# Patient Record
Sex: FEMALE | Race: WHITE | NOT HISPANIC OR LATINO | Employment: OTHER | URBAN - METROPOLITAN AREA
[De-identification: names, ages, dates, MRNs, and addresses within clinical notes are randomized per-mention and may not be internally consistent; named-entity substitution may affect disease eponyms.]

---

## 2017-01-04 ENCOUNTER — LAB CONVERSION - ENCOUNTER (OUTPATIENT)
Dept: OTHER | Facility: OTHER | Age: 67
End: 2017-01-04

## 2017-01-04 ENCOUNTER — GENERIC CONVERSION - ENCOUNTER (OUTPATIENT)
Dept: OTHER | Facility: OTHER | Age: 67
End: 2017-01-04

## 2017-01-04 LAB
ABN TEST REFUSED (HISTORICAL): NORMAL
HEPATITIS C ANTIBODY (HISTORICAL): NORMAL
RAM (HISTORICAL): NORMAL
SIGNAL TO CUT-OFF (HISTORICAL): 0.02
TSH SERPL DL<=0.05 MIU/L-ACNC: 2.52 MIU/L (ref 0.4–4.5)

## 2017-02-16 ENCOUNTER — ALLSCRIPTS OFFICE VISIT (OUTPATIENT)
Dept: OTHER | Facility: OTHER | Age: 67
End: 2017-02-16

## 2017-09-18 ENCOUNTER — ALLSCRIPTS OFFICE VISIT (OUTPATIENT)
Dept: OTHER | Facility: OTHER | Age: 67
End: 2017-09-18

## 2017-10-11 ENCOUNTER — GENERIC CONVERSION - ENCOUNTER (OUTPATIENT)
Dept: OTHER | Facility: OTHER | Age: 67
End: 2017-10-11

## 2017-10-11 DIAGNOSIS — Z12.31 ENCOUNTER FOR SCREENING MAMMOGRAM FOR MALIGNANT NEOPLASM OF BREAST: ICD-10-CM

## 2017-11-21 ENCOUNTER — ALLSCRIPTS OFFICE VISIT (OUTPATIENT)
Dept: OTHER | Facility: OTHER | Age: 67
End: 2017-11-21

## 2017-11-21 DIAGNOSIS — I10 ESSENTIAL (PRIMARY) HYPERTENSION: ICD-10-CM

## 2017-11-22 NOTE — PROGRESS NOTES
Assessment    1  Advanced directives, counseling/discussion (V65 49) (Z71 89)   2  Never smoked cigarettes (V49 89) (Z78 9)   3  Encounter for preventive health examination (V70 0) (Z00 00)   4  Awakens from sleep at night (780 59) (G47 8)   5  Need for shingles vaccine (V04 89) (Z23)   6  Hypertension (401 9) (I10)    Plan  Awakens from sleep at night    · Zolpidem Tartrate 10 MG Oral Tablet; 1/2-1 TAB AT HS FOR INSOMNIA  Health Maintenance, Hypertension    · Losartan Potassium-HCTZ 100-12 5 MG Oral Tablet  Hypertension    · Valsartan 320 MG Oral Tablet; TAKE 1 TABLET DAILY  at night   · (1) CBC/PLT/DIFF; Status:Active; Requested for:21Nov2017;    · (1) COMPREHENSIVE METABOLIC PANEL; Status:Active; Requested for:21Nov2017;    · (1) LIPID PANEL, FASTING; Status:Active; Requested for:21Nov2017;   Need for shingles vaccine    · Shingrix 50 MCG Intramuscular Suspension Reconstituted (Shingrix 50 MCG IntramuscularSuspension Reconstituted); TO BE ADMINISTRED AT PHARMACY  Screening for genitourinary condition    · *VB - Urinary Incontinence Screen (Dx Z13 89 Screen for UI); Status:Complete - Retrospective ByProtocol Authorization;   Done: [id number] 08:09AM    Discussion/Summary  Discussion Summary:   SHINGLES VACCINEIN 3 WEEKSSTRETCHING FOR BACK PAIN AND HAMSTRING TIHGNESS- MAY NEED PTVALSARTAN HCTZ TO VALSARTAN FOR BETTER BP CONTROL AND AVOID DIURETIC;INCREASE MELOXICAM TO 2 PER DAYMAMMO AND PAP UP TO DATEUP TO ABRAHAM VACCINEUP TO DATE  Welcome to [facility] and Wellness Visits: Impression: Subsequent Annual Wellness Visit,-- with preventive exam as well as age and risk appropriate counseling completed  Cardiovascular screening and counseling: counseling was given on maintaining a healthy diet,-- counseling was given on maintaining a healthy weight-- and-- Dx - V81 2 Screen for CV Disorder  Diabetes screening and counseling: Dx - V77 1 Screen for DM    Colorectal cancer screening and counseling: Dx - V76 51 Screen for CRC  Osteoporosis screening and counseling: counseling was given on obtaining adequate amounts of calcium and vitamin D on a daily basis-- and-- counseling was given on the importance of regular weightbearing exercise  Abdominal aortic aneurysm screening and counseling: Dx - V81 2 Screen for CV Disorder  Glaucoma screening and counseling: Dx - V80 1 Screen for Glaucoma  Hepatitis C Screening:  The patient agrees to Hepatitis C screening  Immunizations: influenza vaccine is up to date this year,-- influenza vaccination is recommended annually,-- hepatitis B vaccination series is not indicated at this time due to the patient's low risk of gina the disease-- and-- RX PROVIDED  Advance Directive Planning: complete and up to date,-- she was encouraged to follow-up with me to discuss her questions and/or decisions  Patient Discussion: plan discussed with the patient,-- follow-up visit needed in one year  Chief Complaint  Chief Complaint Free Text Note Form: PATIENT HERE FOR AMW; SHE FEELS WELL; SHE USES MELOXICAM FOR JOINT PAIN- DOESNT FEEL IT IS WORKING WELL; SHE GETS LEG CRAMPS  [place]; SHE HAS HIP PAIN- HURTS IN RIGHT BUTTOCK AND HIP AREA; WORSE IF SHE SITS A LONG TIME; History of Present Illness  HPI: PATIENT HERE FOR AMW;   Hypertension (Follow-Up): The patient presents for follow-up of essential hypertension-- and-- RIGHT BUTTOCK PAIN- NO RADIAITON- HURTS WHEN SHE GETS UP  Symptoms:   Sleep Disorders: The patient is being seen for worsening symptoms of a sleep disorder  The sleep disorder is characterized as AWAKENS FROM SLEEP  Symptoms:  unrefreshing sleep,-- restless legs-- and-- AWAKENS DUE TO BACK / HIP PAIN AND LEG CRAMPS, but-- no excessive daytime sleepiness,-- no witnessed apnea during sleep,-- no witnessed gasping during sleep,-- no nocturnal choking,-- no snoring,-- no sleepiness when sedentary,-- no impaired concentration,-- no memory problems-- and-- no irritability  The patient is currently experiencing symptoms  Back Pain (Brief): The patient is being seen for a routine clinic follow-up of and RIGHT BUTTOCK PAIN back pain  Symptoms:  back pain, but-- no back stiffness,-- no decreased spine range of motion,-- no decreased flexion,-- no decreased extension,-- no decreased rotation,-- no lower extremity numbness,-- no lower extremity tingling-- and-- no lower extremity weakness  The patient is currently experiencing symptoms  Welcome to [facility]: The patient is being seen for the subsequent annual wellness visit  Medicare Screening and Risk Factors  Medicare Screening Tests Risk Questions  Abdominal aortic aneurysm risk assessment: none indicated  Osteoporosis risk assessment: none indicated  HIV risk assessment: none indicated  Drug and Alcohol Use: The patient reports never drinking alcohol  She has never used illicit drugs  Diet and Physical Activity: Current diet includes well balanced meals  She exercises daily  Exercise: walking  Mood Disorder and Cognitive Impairment Screening:  Depression screening  negative for symptoms  She denies feeling down, depressed, or hopeless over the past two weeks  -- She denies feeling little interest or pleasure in doing things over the past two weeks  Cognitive impairment screening: denies difficulty learning/retaining new information,-- denies difficulty handling complex tasks,-- denies difficulty with reasoning,-- denies difficulty with spatial ability and orientation-- and-- denies difficulty with language  Advance Directives: Advance directives: living will,-- durable power of  for health care directives-- and-- advance directives  Co-Managers and Medical Equipment/Suppliers: See Patient Care Team   Reviewed Updated [facility]:  Last Medicare Wellness Visit Information was reviewed, patient interviewed, no change since last AW     Preventive Quality Program 65 and Older: Falls Risk: The patient fell 0 times in the past 12 months  The patient is currently asymptomatic Symptoms Include: The patient currently has no urinary incontinence symptoms  Date of last glaucoma screen was 2017      Review of Systems  Complete-Female:  Constitutional: negative,-- no fever,-- no chills,-- no malaise-- and-- no fatigue  Head and Face: negative  Eyes: negative  ENT: negative,-- no earache,-- no hearing loss,-- no nasal congestion-- and-- no nasal discharge  Cardiovascular: negative,-- no chest pain,-- no palpitations,-- the heart is not racing-- and-- no lightheadedness  Respiratory: negative,-- no cough-- and-- no dry cough  Gastrointestinal: negative,-- no abdominal pain,-- no abdominal bloating,-- no abdominal cramps,-- no nausea-- and-- no vomiting  Genitourinary: negative,-- no dysuria,-- no urinary frequency-- and-- no urinary urgency  Musculoskeletal: back pain-- and-- RIGHT BUTTOCK PAIN; RIGHT HIP PAIN, but-- negative,-- as noted in HPI-- and-- no generalized muscle aches  Integumentary and Breasts: negative,-- no rashes-- and-- no skin lesions  Neurological: negative,-- no headache,-- no confusion-- and-- no dizziness  Psychiatric: insomnia-- and-- TROUB LE SLEEPING, but-- negative-- and-- no irritability  Endocrine: negative,-- no hot flashes-- and-- no muscle weakness  Hematologic and Lymphatic: negative,-- no swollen glands-- and-- no swollen glands in the neck  PHQ-9 Depression Scale: Over the past 2 weeks, how often have you been bothered by the following problems? 1 ) Little interest or pleasure in doing things? Not at all   2 ) Feeling down, depressed or hopeless? Not at all   3 ) Trouble falling asleep or sleeping too much? Several days  4 ) Feeling tired or having little energy? Not at all   5 ) Poor appetite or overeating? Not at all   6 ) Feeling bad about yourself, or that you are a failure, or have let yourself or your family down?  Not at all   7 ) Trouble concentrating on things, such as reading a newspaper or watching television? Not at all   8 ) Moving or speaking so slowly that other people could have noticed, or the opposite, moving or speaking faster than usual? Not at all  How difficult have these problems made it for you to do your work, take care of things at home, or get along with people? Not at all  Score       Active Problems  1  Advanced directives, counseling/discussion (V65 49) (Z71 89)   2  Allergic rhinitis (477 9) (J30 9)   3  Awakens from sleep at night (780 59) (G47 8)   4  Encounter for screening mammogram for breast cancer (V76 12) (Z12 31)   5  Hypertension (401 9) (I10)   6  Lentigo Senilis (709 09)   7  Medicare annual wellness visit, subsequent (V70 0) (Z00 00)   8  Need for influenza vaccination (V04 81) (Z23)   9  Need for shingles vaccine (V04 89) (Z23)   10  Osteoarthrosis (715 90) (M19 90)   11  Screening for genitourinary condition (V81 6) (Z13 89)   12  Sebaceous Hyperplasia (706 9)   13  Skin rash (782 1) (R21)   14  Subclinical hypothyroidism (244 8) (E03 9)   15  Trigger finger (727 03) (M65 30)   16  Trochanteric bursitis (726 5) (M70 60)    Past Medical History  1  History of RLL pneumonia (486) (J18 1)   2  History of Trochanteric bursitis (726 5) (M70 60)  Active Problems And Past Medical History Reviewed: The active problems and past medical history were reviewed and updated today  Surgical History  1  History of Repair Of Retinal Detachment Complex  Surgical History Reviewed: The surgical history was reviewed and updated today  Family History  Mother    1  Family history of Coronary disease   2  Family history of colon cancer (V16 0) (Z80 0)   3  Family history of diabetes mellitus (V18 0) (Z83 3)  Father    4  Family history of Coronary disease   5  Family history of diabetes mellitus (V18 0) (Z83 3)  Family History    6  Family history of Colon Cancer (V16 0)   7  Family history of Coronary Artery Disease (V17 49)   8  Family history of Type 2 Diabetes Mellitus  Family History Reviewed: The family history was reviewed and updated today  Social History     · Never smoked cigarettes (V49 89) (Z78 9)  Social History Reviewed: The social history was reviewed and updated today  The social history was reviewed and is unchanged  Current Meds   1  B12-Active 1 MG Oral Tablet Chewable; Therapy: (Recorded:11Aug2016) to Recorded   2  Calcium Plus Vitamin D CAPS; Therapy: (Recorded:11Aug2016) to Recorded   3  CVS Loratadine TABS; take 1 tablet daily as needed Recorded   4  CVS Vitamin E 200 UNIT CAPS; Therapy: (Recorded:11Aug2016) to Recorded   5  Fluticasone Propionate 50 MCG/ACT Nasal Suspension; USE 2 SPRAYS IN EACH NOSTRIL ONCE DAILY; Therapy: [id number] to (Last Rx:[id number])  Requested for: 18Sep2017 Ordered   6  Losartan Potassium-HCTZ 100-12 5 MG Oral Tablet; Take 1 tablet by mouth  daily; Therapy: [id number] to (Evaluate:08Nov2017)  Requested for: 10Aug2017; Last Rx:10Aug2017 Ordered   7  Melatonin 3 MG Oral Capsule; Therapy: (Recorded:11Aug2016) to Recorded   8  Meloxicam 7 5 MG Oral Tablet; Take 1 tablet by mouth  every day; Therapy: [id number] to (Luis Daniel Watson)  Requested for: 10Aug2017; Last Rx:10Aug2017 Ordered  Medication List Reviewed: The medication list was reviewed and updated today  Allergies  1  No Known Drug Allergies    Vitals  Vital Signs    Recorded: 21Nov2017 08:06AM   Temperature 97 9 F, Tympanic   Heart Rate 78   Respiration 16   Systolic 479, LUE, Sitting   Diastolic 209, LUE, Sitting   Height 5 ft 5 in   Weight 163 lb 8 oz   BMI Calculated 27 21   BSA Calculated 1 82       Physical Exam   Constitutional  General appearance: No acute distress, well appearing and well nourished  Eyes  Conjunctiva and lids: No swelling, erythema or discharge  Conjunctiva Findings: normal in both eyes  Eye Lids: normal bilaterally  Pupils and irises: Equal, round, reactive to light     Ears, Nose, Mouth, and Throat  External inspection of ears and nose: Normal    Otoscopic examination: Tympanic membranes translucent with normal light reflex  Canals patent without erythema  -- CLAR B L  Hearing: Normal    Nasal mucosa, septum, and turbinates: Normal without edema or erythema  Lips, teeth, and gums: Normal, good dentition  Oropharynx: Normal with no erythema, edema, exudate or lesions  Inspection of the oropharynx showed fully visible tonsils, uvula and soft palate (Mallampati class 1)  Oral mucosa was moist, but-- was normal  The palate examination showed no abnormalities  The tongue was normal  The tonsils were normal   Neck  Neck: Supple, symmetric, trachea midline, no masses  Thyroid: Normal, no thyromegaly  -- NO NODULES  Pulmonary  Respiratory effort: No increased work of breathing or signs of respiratory distress  Percussion of chest: Normal    Palpation of chest: Normal    Auscultation of lungs: Clear to auscultation  Auscultation of the lungs revealed no expiratory wheezing,-- normal expiratory time-- and-- no inspiratory wheezing  no rales or crackles were heard bilaterally  no rhonchi  no friction rub  no wheezing  no diminished breath sounds  no bronchial breath sounds  -- LEAR B/L  Cardiovascular  Palpation of heart: Normal PMI, no thrills  Auscultation of heart: Normal rate and rhythm, normal S1 and S2, no murmurs  The heart rate was normal  The rhythm was regular  Heart sounds: normal S1,-- normal S2,-- no S3-- and-- no S4  no murmurs were heard  -- REGULAR NO S3 NO S4   Carotid pulses: 2+ bilaterally  Abdominal aorta: Normal    Femoral pulses: 2+ bilaterally  Pedal pulses: 2+ bilaterally  Examination of extremities for edema and/or varicosities: Normal    Abdomen  Abdomen: Non-tender, no masses  Liver and spleen: No hepatomegaly or splenomegaly  Lymphatic  Palpation of lymph nodes in neck: No lymphadenopathy  -- NO NODES  -- NO NODES    Musculoskeletal  Gait and station: Normal  -- RIGHT PAIN AT PSIS; NORMAL FB AND BB;   Digits and nails: Normal without clubbing or cyanosis  Joints, bones, and muscles: Normal    Range of motion: Normal    Stability: Normal    Muscle strength/tone: Normal    Skin  Skin and subcutaneous tissue: Normal without rashes or lesions  Palpation of skin and subcutaneous tissue: Normal turgor  -- BUNIONS B/L FEET  Neurologic  Cranial nerves: Cranial nerves II-XII intact  Reflexes: 2+ and symmetric  Sensation: No sensory loss  Psychiatric  Judgment and insight: Normal    Orientation to person, place, and time: Normal    Recent and remote memory: Intact  Mood and affect: Normal        Results/Data  *VB - Urinary Incontinence Screen (Dx Z13 89 Screen for UI) [id number] 08:09AM [facility]     Test Name Result Flag Reference   Urinary Incontinence Assessment [id number]       Falls Risk Assessment (Dx Z13 89 Screen for Neurologic Disorder) [id number] 08:08AM User, [employer]     Test Name Result Flag Reference   Falls Risk      No falls in the past year     PHQ-9 Adult Depression Screening 21Nov2017 08:08AM User, [employer]     Test Name Result Flag Reference   PHQ-9 Adult Depression Score 1       Over the last two weeks, how often have you been bothered by any of the following problems? Little interest or pleasure in doing things: Not at all - 0 Feeling down, depressed, or hopeless: Not at all - 0 Trouble falling or staying asleep, or sleeping too much: Several days - 1 Feeling tired or having little energy: Not at all - 0 Poor appetite or over eating: Not at all - 0 Feeling bad about yourself - or that you are a failure or have let yourself or your family down: Not at all - 0 Trouble concentrating on things, such as reading the newspaper or watching television: Not at all - 0 Moving or speaking so slowly that other people could have noticed   Or the opposite -  being so fidgety or restless that you have been moving around a lot more than usual: Not at all - 0 Thoughts that you would be better off dead, or of hurting yourself in some way: Not at all - 0   PHQ-9 Adult Depression Screening Negative     PHQ-9 Difficulty Level Not difficult at all     PHQ-9 Severity Minimal Depression           Signatures   Electronically signed by :  Shiraz Villalobos DO; Nov 21 2017  8:43AM EST                       (Author)

## 2017-12-04 ENCOUNTER — GENERIC CONVERSION - ENCOUNTER (OUTPATIENT)
Dept: OTHER | Facility: OTHER | Age: 67
End: 2017-12-04

## 2017-12-04 ENCOUNTER — HOSPITAL ENCOUNTER (OUTPATIENT)
Dept: MAMMOGRAPHY | Facility: HOSPITAL | Age: 67
Discharge: HOME | End: 2017-12-04
Payer: MEDICARE

## 2017-12-04 DIAGNOSIS — Z12.31 ENCOUNTER FOR SCREENING MAMMOGRAM FOR MALIGNANT NEOPLASM OF BREAST: ICD-10-CM

## 2017-12-04 PROCEDURE — 77063 BREAST TOMOSYNTHESIS BI: CPT

## 2017-12-04 PROCEDURE — G0202 SCR MAMMO BI INCL CAD: HCPCS

## 2018-01-02 LAB
A/G RATIO (HISTORICAL): 1.5 (CALC) (ref 1–2.5)
ALBUMIN SERPL BCP-MCNC: 4.2 G/DL (ref 3.6–5.1)
ALP SERPL-CCNC: 65 U/L (ref 33–130)
ALT SERPL W P-5'-P-CCNC: 16 U/L (ref 6–29)
AST SERPL W P-5'-P-CCNC: 22 U/L (ref 10–35)
BASOPHILS # BLD AUTO: 0.5 %
BASOPHILS # BLD AUTO: 40 CELLS/UL (ref 0–200)
BILIRUB SERPL-MCNC: 0.6 MG/DL (ref 0.2–1.2)
BUN SERPL-MCNC: 20 MG/DL (ref 7–25)
BUN/CREA RATIO (HISTORICAL): 18 (CALC) (ref 6–22)
CALCIUM SERPL-MCNC: 9.7 MG/DL (ref 8.6–10.4)
CHLORIDE SERPL-SCNC: 104 MMOL/L (ref 98–110)
CHOLEST SERPL-MCNC: 242 MG/DL
CHOLEST/HDLC SERPL: 4.2 (CALC)
CO2 SERPL-SCNC: 28 MMOL/L (ref 20–31)
CREAT SERPL-MCNC: 1.09 MG/DL (ref 0.5–0.99)
DEPRECATED RDW RBC AUTO: 12.2 % (ref 11–15)
EGFR AFRICAN AMERICAN (HISTORICAL): 60 ML/MIN/1.73M2
EGFR-AMERICAN CALC (HISTORICAL): 52 ML/MIN/1.73M2
EOSINOPHIL # BLD AUTO: 292 CELLS/UL (ref 15–500)
EOSINOPHIL # BLD AUTO: 3.7 %
GAMMA GLOBULIN (HISTORICAL): 2.8 G/DL (CALC) (ref 1.9–3.7)
GLUCOSE (HISTORICAL): 95 MG/DL (ref 65–99)
HCT VFR BLD AUTO: 40.9 % (ref 35–45)
HDLC SERPL-MCNC: 57 MG/DL
HGB BLD-MCNC: 13.5 G/DL (ref 11.7–15.5)
LDL CHOLESTEROL (HISTORICAL): 151 MG/DL (CALC)
LYMPHOCYTES # BLD AUTO: 3460 CELLS/UL (ref 850–3900)
LYMPHOCYTES # BLD AUTO: 43.8 %
MCH RBC QN AUTO: 31.3 PG (ref 27–33)
MCHC RBC AUTO-ENTMCNC: 33 G/DL (ref 32–36)
MCV RBC AUTO: 94.9 FL (ref 80–100)
MONOCYTES # BLD AUTO: 719 CELLS/UL (ref 200–950)
MONOCYTES (HISTORICAL): 9.1 %
NEUTROPHILS # BLD AUTO: 3389 CELLS/UL (ref 1500–7800)
NEUTROPHILS # BLD AUTO: 42.9 %
NON-HDL-CHOL (CHOL-HDL) (HISTORICAL): 185 MG/DL (CALC)
PLATELET # BLD AUTO: 281 THOUSAND/UL (ref 140–400)
PMV BLD AUTO: 10 FL (ref 7.5–12.5)
POTASSIUM SERPL-SCNC: 3.8 MMOL/L (ref 3.5–5.3)
RBC # BLD AUTO: 4.31 MILLION/UL (ref 3.8–5.1)
SODIUM SERPL-SCNC: 140 MMOL/L (ref 135–146)
TOTAL PROTEIN (HISTORICAL): 7 G/DL (ref 6.1–8.1)
TRIGL SERPL-MCNC: 199 MG/DL
WBC # BLD AUTO: 7.9 THOUSAND/UL (ref 3.8–10.8)

## 2018-01-08 ENCOUNTER — GENERIC CONVERSION - ENCOUNTER (OUTPATIENT)
Dept: OTHER | Facility: OTHER | Age: 68
End: 2018-01-08

## 2018-01-10 NOTE — RESULT NOTES
Message   Labs are all excellent- no evidence of hepatitis c infection  Verified Results  (1) COMPREHENSIVE METABOLIC PANEL [id number] 00:56MW [employer] [facility]     Test Name Result Flag Reference   GLUCOSE 90 mg/dL  65-99   Fasting reference interval   UREA NITROGEN (BUN) 20 mg/dL  7-25   CREATININE 0 90 mg/dL  0 50-0 99   For patients >52years of age, the reference limit  for Creatinine is approximately 13% higher for people  identified as -American  eGFR NON-AFR  AMERICAN 66 mL/min/1 73m2  > OR = 60   eGFR AFRICAN AMERICAN 76 mL/min/1 73m2  > OR = 60   BUN/CREATININE RATIO   4-55   NOT APPLICABLE (calc)   SODIUM 140 mmol/L  135-146   POTASSIUM 3 7 mmol/L  3 5-5 3   CHLORIDE 103 mmol/L     CARBON DIOXIDE 27 mmol/L  20-31   CALCIUM 9 7 mg/dL  8 6-10 4   PROTEIN, TOTAL 6 6 g/dL  6 1-8 1   ALBUMIN 4 1 g/dL  3 6-5 1   GLOBULIN 2 5 g/dL (calc)  1 9-3 7   ALBUMIN/GLOBULIN RATIO 1 6 (calc)  1 0-2 5   BILIRUBIN, TOTAL 0 5 mg/dL  0 2-1 2   ALKALINE PHOSPHATASE 60 U/L     AST 18 U/L  10-35   ALT 13 U/L  6-29     (1) LIPID PANEL, FASTING [id number] 09:12AM [facility]     Test Name Result Flag Reference   CHOLESTEROL, TOTAL 190 mg/dL  125-200   HDL CHOLESTEROL 53 mg/dL  > OR = 46   TRIGLICERIDES 067 mg/dL  <150   LDL-CHOLESTEROL 111 mg/dL (calc)  <130   Desirable range <100 mg/dL for patients with CHD or  diabetes and <70 mg/dL for diabetic patients with  known heart disease  CHOL/HDLC RATIO 3 6 (calc)  < OR = 5 0   NON HDL CHOLESTEROL 137 mg/dL (calc)     Target for non-HDL cholesterol is 30 mg/dL higher than   LDL cholesterol target       (Q) HEPATITIS C ANTIBODY [id number] 09:12AM [facility]     Test Name Result Flag Reference   HEPATITIS C ANTIBODY NON-REACTIVE  NON-REACTIVE   SIGNAL TO CUT-OFF 0 02  <1 00     (Q) ABN TEST REFUSAL [id number] 09:12AM [facility]     Test Name Result Flag Reference   YOVANY See Below     Be advised that your patient has indicated on  the advance beneficiary notice their decision  not to receive the following laboratory tests  As a result, the tests will not be performed     ABN TEST REFUSED VIT D / A1C       (Q) TSH, 3RD GENERATION W/REFLEX TO FT4 [id number] 09:12AM [facility]   REPORT COMMENT:  FASTING:YES     Test Name Result Flag Reference   TSH W/REFLEX TO FT4 2 52 mIU/L  0 40-4 50

## 2018-01-10 NOTE — PROGRESS NOTES
Assessment    1  Medicare annual wellness visit, subsequent (V70 0) (Z00 00)   2  Encounter for screening mammogram for breast cancer (V76 12) (Z12 31)   3  Encounter for screening for cardiovascular disorders (V81 2) (Z13 6)   4  Encounter for screening for diabetes mellitus (V77 1) (Z13 1)   5  Need for shingles vaccine (V04 89) (Z23)    Plan  Encounter for screening for cardiovascular disorders, Encounter for screening for  diabetes mellitus    · (1) HEMOGLOBIN A1C; Status:Active; Requested for:16Nov2016;   Encounter for screening mammogram for breast cancer    · (1) COMPREHENSIVE METABOLIC PANEL; Status:Active; Requested for:16Nov2016;    · (1) HEP C ANTIBODY; Status:Active; Requested for:16Nov2016;    · (1) VITAMIN D 25-HYDROXY; Status:Active; Requested for:16Nov2016;   Encounter for screening mammogram for breast cancer, Hypertension, Subclinical  hypothyroidism    · (1) LIPID PANEL, FASTING; Status:Active; Requested for:16Nov2016;    · (1) TSH WITH FT4 REFLEX; Status:Active; Requested for:16Nov2016; Health Maintenance, Hypertension    · Losartan Potassium-HCTZ 100-12 5 MG Oral Tablet; Take 1 tablet by mouth   daily  Need for prophylactic vaccination against diphtheria-tetanus-pertussis (DTP), Need for  shingles vaccine    · Zoster (Zostavax) (Zoster (Zostavax)); PATIENT TO OBTAIN SHINGLES VACCINE  AT PHARMACY  Shoulder joint pain, unspecified laterality    · Meloxicam 7 5 MG Oral Tablet; Take 1 tablet by mouth  every day    Discussion/Summary    OBTAIN LABS  BACK PAIN- BETTER WITH EXERCISE  XRAYS REVIEWED  MAMMO DUE NEXT WEEK  REFILL MEDS  AK ON SCALP 1 CM BY 5 MM FLESH COLORED;   REFER TO DR Susan Abarca  Impression: Subsequent Annual Wellness Visit, with preventive exam as well as age and risk appropriate counseling completed  Cardiovascular screening and counseling: Dx - V81 2 Screen for CV Disorder  Diabetes screening and counseling: Dx - V77 1 Screen for DM     Colorectal cancer screening and counseling: Dx - V76 51 Screen for CRC  Cervical cancer screening and counseling: screening is current  Osteoporosis screening and counseling: screening is current  Glaucoma screening and counseling: Dx - V80 1 Screen for Glaucoma  Immunizations: the lifetime pneumococcal vaccine has been completed, hepatitis B vaccination series is not indicated at this time due to the patient's low risk of gina the disease and Tdap vaccine needed today  Advance Directive Planning: complete and up to date, paperwork and instructions were given to the patient  Patient Discussion: plan discussed with the patient, follow-up visit needed in one year  Chief Complaint  PATIENT HERE FOR AMW; History of Present Illness  HPI: PATIENT FEELS WELL;   HER BP IS STABLE    Welcome to [employer] and Wellness Visits: The patient is being seen for the subsequent annual wellness visit  Medicare Screening and Risk Factors   Medicare Screening Tests Risk Questions   Abdominal aortic aneurysm risk assessment: none indicated  Osteoporosis risk assessment: none indicated  HIV risk assessment: none indicated  Drug and Alcohol Use: The patient reports never drinking alcohol  She has never used illicit drugs  Diet and Physical Activity: Current diet includes well balanced meals  She exercises daily  Exercise: walking  Mood Disorder and Cognitive Impairment Screening:   Depression screening  negative for symptoms  She denies feeling down, depressed, or hopeless over the past two weeks  She denies feeling little interest or pleasure in doing things over the past two weeks  Cognitive impairment screening: denies difficulty learning/retaining new information, denies difficulty handling complex tasks, denies difficulty with reasoning, denies difficulty with spatial ability and orientation and denies difficulty with language  Functional Ability/Level of Safety: Hearing is normal bilaterally   The patient is currently able to do activities of daily living without limitations, able to do instrumental activities of daily living without limitations, able to participate in social activities without limitations and able to drive without limitations  Activities of daily living details: does not need help using the phone and no meal preparation help needed  Fall risk factors:  no visual impairment and no cognitive impairment  Home safety risk factors:  no unfamiliar surroundings, no poor household lighting, no uneven floors, no household clutter, grab bars in the bathroom and handrails on the stairs  Advance Directives: Advance directives: living will, durable power of  for health care directives and advance directives  Co-Managers and Medical Equipment/Suppliers: See Patient Care Team   Reviewed Updated [facility]:   Last Medicare Wellness Visit Information was reviewed, patient interviewed, no change since last [facility]  Preventive Quality Program 65 and Older: Falls Risk: The patient fell 1 times in the past 12 months  TRIPPED UP STAIRS  The patient is currently asymptomatic Symptoms Include: no confusion, no lightheadedness, no vertigo, no dizziness, no syncope, no impaired balance, no visual problems and no leg weakness  Associated symptoms:  No associated symptoms are reported no impaired mobility and no impaired ability to live independently  The patient currently has no urinary incontinence symptoms  Urinary Incontinence Symptoms includes: nocturia, but no urinary incontinence, no incomplete bladder emptying, no urinary frequency, no urinary urgency, no dysuria and no straining    Date of last glaucoma screen was 1 -      Patient Care Team    Care Team Member Role Specialty Office Number   [facility]  Gastroenterology Adult [phone]   Byron Crain MD  Ophthalmology [phone]   [address] [phone]     Review of Systems    Constitutional: negative     Eyes: negative, no eye pain and eyes not red  ENT: negative, no nasal congestion, no nasal discharge and no sneezing  Cardiovascular: negative, the heart is not racing and no lightheadedness  Respiratory: negative, no shortness of breath, no wheezing, not sleeping upright or with extra pillows, no cough, no dry cough, no clear sputum, no colored sputum and not vomiting blood  Gastrointestinal: negative, no abdominal pain and no abdominal bloating  Genitourinary: negative, no dysuria, no urinary frequency, no urinary urgency and no suprapubic pain  Musculoskeletal: BUNIONS B/L FEET, but negative, no diffuse joint pain, no generalized muscle aches, no joint swelling and no joint stiffness  Integumentary and Breasts: negative, no skin lesions, no skin wound, no itching, no erythema, no edema, no skin scaling, not blister, no nodule, no papule and no pustule  Neurological: negative  Psychiatric: negative  Endocrine: negative  Hematologic and Lymphatic: negative  Over the past 2 weeks, how often have you been bothered by the following problems? 1 ) Little interest or pleasure in doing things? Not at all    2 ) Feeling down, depressed or hopeless? Not at all    3 ) Trouble falling asleep or sleeping too much? Not at all    4 ) Feeling tired or having little energy? Not at all    5 ) Poor appetite or overeating? Not at all    6 ) Feeling bad about yourself, or that you are a failure, or have let yourself or your family down? Not at all    7 ) Trouble concentrating on things, such as reading a newspaper or watching television? Not at all    8 ) Moving or speaking so slowly that other people could have noticed, or the opposite, moving or speaking faster than usual? Not at all    9 ) Thoughts that you would be better off dead or of hurting yourself in some way? Not at all  Active Problems    1  Allergic rhinitis (477 9) (J30 9)   2  Biceps tendonitis on left (726 12) (M75 22)   3   Encounter for screening mammogram for breast cancer (V76 12) (Z12 31)   4  Hypertension (401 9) (I10)   5  Lentigo (709 09) (L81 4)   6  Lentigo Senilis (709 09)   7  Medicare annual wellness visit, subsequent (V70 0) (Z00 00)   8  Osteoarthrosis (715 90) (M19 90)   9  Pain due to onychomycosis of nail (110 1,729 5) (B35 1,M79 609)   10  Periodic health assessment, Pap and pelvic (V76 2,V72 31) (Z01 419)   11  Sebaceous Hyperplasia (706 9)   12  Shoulder joint pain, unspecified laterality   13  Subclinical hypothyroidism (244 8) (E03 9)   14  Trigger finger (727 03) (M65 30)   15  Trochanteric bursitis (726 5) (M70 60)   16  Upper respiratory infection (465 9) (J06 9)    Past Medical History    · History of RLL pneumonia (486) (J18 9)   · History of Trochanteric bursitis (726 5) (M70 60)    The active problems and past medical history were reviewed and updated today  Surgical History    · History of Repair Of Retinal Detachment Complex    The surgical history was reviewed and updated today  Family History  Mother    · Family history of Coronary disease   · Family history of colon cancer (V16 0) (Z80 0)   · Family history of diabetes mellitus (V18 0) (Z83 3)  Father    · Family history of Coronary disease   · Family history of diabetes mellitus (V18 0) (Z83 3)  Family History    · Family history of Colon Cancer (V16 0)   · Family history of Coronary Artery Disease (V17 49)   · Family history of Type 2 Diabetes Mellitus    The family history was reviewed and updated today  Social History    · Never A Smoker    Current Meds   1  B12-Active 1 MG Oral Tablet Chewable; Therapy: (Recorded:11Aug2016) to Recorded   2  Calcium Plus Vitamin D CAPS; Therapy: (Recorded:11Aug2016) to Recorded   3  [employer] Loratadine TABS; take 1 tablet daily as needed Recorded   4  [employer] Vitamin E 200 UNIT CAPS; Therapy: (Recorded:11Aug2016) to Recorded   5  Fluticasone Propionate 50 MCG/ACT Nasal Suspension; USE 2 SPRAYS IN EACH   NOSTRIL ONCE DAILY;    Therapy: 34ECR9299 to (Juan Diego Rivas)  Requested for: 64EMT1047; Last   Rx:08Oct2013 Ordered   6  Losartan Potassium-HCTZ 100-12 5 MG Oral Tablet; Take 1 tablet by mouth  daily; Therapy: 78ZRB4814 to (Evaluate:29Nov2016)  Requested for: 78Yvg5803; Last   Rx:35Sak8772 Ordered   7  Melatonin 3 MG Oral Capsule; Therapy: (Recorded:11Aug2016) to Recorded   8  Meloxicam 7 5 MG Oral Tablet; Take 1 tablet by mouth  every day; Therapy: 02HKN7251 to (Colby Mckeon)  Requested for: 13Clj1967; Last   Rx:71Aoy0349 Ordered    Allergies    1  No Known Drug Allergies    Immunizations   1 2 3 4    Influenza  26-Sep-2012  (62y) 21-Oct-2013  (63y) 08-Jan-2015  (64y) 13-VZM-6533  (65y)    PCV  08-Jan-2015  (64y)       PPSV  14-Nov-2013  (63y)       Tdap  09-Nov-2015  (65y)        Vitals  Signs    Systolic: 380  Diastolic: 80  Heart Rate: 78  Respiration: 16  Temperature: 97 6 F  Height: 5 ft 5 in  Weight: 172 lb 2 08 oz  BMI Calculated: 28 64  BSA Calculated: 1 86    Physical Exam    Constitutional   General appearance: No acute distress, well appearing and well nourished  WDWN FEMALE IN NAD;    Eyes   Conjunctiva and lids: No swelling, erythema or discharge  Conjunctiva Findings: normal in both eyes  Eye Lids: normal bilaterally  EOMI PERRLA  Pupils and irises: Equal, round, reactive to light  Ears, Nose, Mouth, and Throat   External inspection of ears and nose: Normal     Otoscopic examination: Tympanic membranes translucent with normal light reflex  Canals patent without erythema  CLEAR B L  Hearing: Normal     Nasal mucosa, septum, and turbinates: Normal without edema or erythema  Lips, teeth, and gums: Normal, good dentition  Oropharynx: Normal with no erythema, edema, exudate or lesions  Inspection of the oropharynx showed fully visible tonsils, uvula and soft palate (Mallampati class 1)  Oral mucosa was moist, but was normal  The palate examination showed no abnormalities   The tongue was normal  The tonsils were Depression Screening 82OGW2384 01:08PM User, s     Test Name Result Flag Reference   PHQ-2 Adult Depression Score 0     Over the last two weeks, how often have you been bothered by any of the following problems? Little interest or pleasure in doing things: Not at all - 0  Feeling down, depressed, or hopeless: Not at all - 0   PHQ-2 Adult Depression Screening Negative         Signatures   Electronically signed by :  Shiraz Villalobos DO; Nov 16 2016  1:41PM EST                       (Author)

## 2018-01-12 NOTE — RESULT NOTES
Verified Results  (Q) TSH, 3RD GENERATION 53Mtw2541 03:06PM Sidonie Matter   REPORT COMMENT:  FASTING:NO     Test Name Result Flag Reference   TSH 2 06 mIU/L  0 40-4 50

## 2018-01-12 NOTE — RESULT NOTES
Verified Results  * XR SPINE LUMBAR MINIMUM 4 VIEWS NON INJURY 27Wee5412 03:10PM Tammi Workman Order Number: [id number]     Test Name Result Flag Reference   XR SPINE LUMBAR MINIMUM 4 VIEWS (Report)     LUMBAR SPINE     INDICATION: Several month history of nontraumatic low back pain  COMPARISON: None     VIEWS: AP, lateral, bilateral oblique and coned down projections; 5 images     FINDINGS:     Alignment is unremarkable  There is no radiographic evidence of acute fracture or destructive osseous lesion  Moderate loss of disc height L5-S1 compatible with degenerative disc disease  Mild facet sclerosis L4-5 and L5-S1  Visualized soft tissues appear unremarkable  IMPRESSION:     Moderate degenerative disc disease L5-S1 with mild facet sclerosis at L4-5 and L5-S1         Workstation performed: [id number]     Signed by:   Alexander Acevedo DO   8/17/16

## 2018-01-13 VITALS
HEART RATE: 80 BPM | DIASTOLIC BLOOD PRESSURE: 86 MMHG | HEIGHT: 65 IN | RESPIRATION RATE: 16 BRPM | BODY MASS INDEX: 26.49 KG/M2 | SYSTOLIC BLOOD PRESSURE: 130 MMHG | WEIGHT: 159 LBS | TEMPERATURE: 97.3 F

## 2018-01-13 NOTE — RESULT NOTES
Message   Repeat one year     Verified Results  * MAMMO SCREENING BILATERAL W CAD [id number] 08:47AM Shana Ruano    Order Number: [id number]    - Patient Instructions: To schedule this appointment, please contact Central Scheduling at [phone]  Do not wear any perfume, powder, lotion or deodorant on breast or underarm area  Please bring your doctors order, referral (if needed) and insurance information with you on the day of the test  Failure to bring this information may result in this test being rescheduled  Arrive 15 minutes prior to your appointment time to register  On the day of your test, please bring any prior mammogram or breast studies with you that were not performed at a [facility]  Failure to bring prior exams may result in your test needing to be rescheduled   Order Number: [id number]    - Patient Instructions: To schedule this appointment, please contact Central Scheduling at [phone]  Do not wear any perfume, powder, lotion or deodorant on breast or underarm area  Please bring your doctors order, referral (if needed) and insurance information with you on the day of the test  Failure to bring this information may result in this test being rescheduled  Arrive 15 minutes prior to your appointment time to register  On the day of your test, please bring any prior mammogram or breast studies with you that were not performed at a [facility]  Failure to bring prior exams may result in your test needing to be rescheduled  Test Name Result Flag Reference   MAMMO SCREENING BILATERAL W CAD (Report)     Patient History:   Patient is postmenopausal and had first child at age 30  Family history of colorectal cancer in mother at age 67  Took estrogen for 5 years beginning at age 50  Patient has never smoked  Patient's BMI is 25 8  Reason for exam: screening (asymptomatic)       Mammo Screening Bilateral W CAD: November 28, 2016 - Check In #:    [id number]   Bilateral CC and MLO view(s) were taken  Technologist: JULISSA Boothe T (R)(M)   Prior study comparison: November 20, 2015, bilateral digital    screening mammogram performed at [address]  October 1, 2014, bilateral digital screening mammogram    performed at [facility]  August 23, 2013, bilateral digital screening mammogram performed at [address] [place] [address]  July 11, 2011, digital    bilateral screening mammogram, performed at [facility]  November 4, 2009, bilateral screening mammogram,    performed at [address] [place] [address]  There are scattered fibroglandular densities  No dominant soft tissue mass, architectural distortion or    suspicious calcifications are noted  The skin and nipple    contours are within normal limits  No evidence of malignancy  No significant changes   when compared with prior studies  ASSESSMENT: BiRad:1 - Negative     Recommendation:   Routine screening mammogram of both breasts in 1 year  A    reminder letter will be sent  Analyzed by CAD     8-10% of cancers will be missed on mammography  Management of a    palpable abnormality must be based on clinical grounds  Patients   will be notified of their results via letter from our facility  Accredited by [employer] of Radiology and FDA  Transcription Location:  [address]: [id number]     Risk Value(s):   Tyrer-Cuzick 10 Year: 3 337%, Tyrer-Cuzick Lifetime: 6 055%,    Myriad Table: 1 5%, ALBERTINA 5 Year: 2 1%, NCI Lifetime: 6 9%   Signed by:    Malathi Bustillo MD   11/28/16

## 2018-01-13 NOTE — PROGRESS NOTES
Assessment    1  Advanced directives, counseling/discussion (V65 49) (Z71 89)   2  Never smoked cigarettes (V49 89) (Z78 9)   3  Encounter for preventive health examination (V70 0) (Z00 00)   4  Awakens from sleep at night (780 59) (G47 8)   5  Need for shingles vaccine (V04 89) (Z23)   6  Hypertension (401 9) (I10)    Plan  Awakens from sleep at night    · Zolpidem Tartrate 10 MG Oral Tablet; 1/2-1 TAB AT HS FOR INSOMNIA  Health Maintenance, Hypertension    · Losartan Potassium-HCTZ 100-12 5 MG Oral Tablet  Hypertension    · Valsartan 320 MG Oral Tablet; TAKE 1 TABLET DAILY  at night   · (1) CBC/PLT/DIFF; Status:Active; Requested for:21Nov2017;    · (1) COMPREHENSIVE METABOLIC PANEL; Status:Active; Requested for:21Nov2017;    · (1) LIPID PANEL, FASTING; Status:Active; Requested for:21Nov2017;   Need for shingles vaccine    · Shingrix 50 MCG Intramuscular Suspension Reconstituted (Shingrix 50 MCG  Intramuscular Suspension Reconstituted); TO BE ADMINISTRED AT PHARMACY  Screening for genitourinary condition    · *VB - Urinary Incontinence Screen (Dx Z13 89 Screen for UI); Status:Complete -  Retrospective By Protocol Authorization;   Done: [id number] 08:09AM    Discussion/Summary    SHINGLES VACCINE  LABS IN 3 WEEKS  ADD STRETCHING FOR BACK PAIN AND HAMSTRING TIHGNESS- MAY NEED PT   CHANGE VALSARTAN HCTZ TO VALSARTAN FOR BETTER BP CONTROL AND AVOID DIURETIC;   CAN INCREASE MELOXICAM TO 2 PER DAY  AMW- MAMMO AND PAP UP TO DATE  IMM UP TO DATE  NEEDS SHINGLES VACCINE  COLONO UP TO DATE  Impression: Subsequent Annual Wellness Visit, with preventive exam as well as age and risk appropriate counseling completed  Cardiovascular screening and counseling: counseling was given on maintaining a healthy diet, counseling was given on maintaining a healthy weight and Dx - V81 2 Screen for CV Disorder  Diabetes screening and counseling: Dx - V77 1 Screen for DM     Colorectal cancer screening and counseling: Dx - V76 51 Screen for CRC  Osteoporosis screening and counseling: counseling was given on obtaining adequate amounts of calcium and vitamin D on a daily basis and counseling was given on the importance of regular weightbearing exercise  Abdominal aortic aneurysm screening and counseling: Dx - V81 2 Screen for CV Disorder  Glaucoma screening and counseling: Dx - V80 1 Screen for Glaucoma  Hepatitis C Screening:  The patient agrees to Hepatitis C screening  Immunizations: influenza vaccine is up to date this year, influenza vaccination is recommended annually, hepatitis B vaccination series is not indicated at this time due to the patient's low risk of gina the disease and RX PROVIDED  Advance Directive Planning: complete and up to date, she was encouraged to follow-up with me to discuss her questions and/or decisions  Patient Discussion: plan discussed with the patient, follow-up visit needed in one year  Chief Complaint  PATIENT HERE FOR AMW; SHE FEELS WELL; SHE USES MELOXICAM FOR JOINT PAIN- DOESNT FEEL IT IS WORKING WELL; SHE GETS LEG CRAMPS  [place]; SHE HAS HIP PAIN- HURTS IN RIGHT BUTTOCK AND HIP AREA; WORSE IF SHE SITS A LONG TIME; History of Present Illness  Sleep Disorders: The patient is being seen for worsening symptoms of a sleep disorder  The sleep disorder is characterized as AWAKENS FROM SLEEP  Symptoms:  unrefreshing sleep, restless legs and AWAKENS DUE TO BACK / HIP PAIN AND LEG CRAMPS, but no excessive daytime sleepiness, no witnessed apnea during sleep, no witnessed gasping during sleep, no nocturnal choking, no snoring, no sleepiness when sedentary, no impaired concentration, no memory problems and no irritability  The patient is currently experiencing symptoms  Hypertension (Follow-Up): The patient presents for follow-up of essential hypertension and RIGHT BUTTOCK PAIN- NO RADIAITON- HURTS WHEN SHE GETS UP  Symptoms:   Back Pain (Brief):  The patient is being seen for a routine clinic follow-up of and RIGHT BUTTOCK PAIN back pain  Symptoms:  back pain, but no back stiffness, no decreased spine range of motion, no decreased flexion, no decreased extension, no decreased rotation, no lower extremity numbness, no lower extremity tingling and no lower extremity weakness  The patient is currently experiencing symptoms  HPI: PATIENT HERE FOR AMW;   Welcome to [employer] and Wellness Visits: The patient is being seen for the subsequent annual wellness visit  Medicare Screening and Risk Factors   Medicare Screening Tests Risk Questions   Abdominal aortic aneurysm risk assessment: none indicated  Osteoporosis risk assessment: none indicated  HIV risk assessment: none indicated  Drug and Alcohol Use: The patient reports never drinking alcohol  She has never used illicit drugs  Diet and Physical Activity: Current diet includes well balanced meals  She exercises daily  Exercise: walking  Mood Disorder and Cognitive Impairment Screening:   Depression screening  negative for symptoms  She denies feeling down, depressed, or hopeless over the past two weeks  She denies feeling little interest or pleasure in doing things over the past two weeks  Cognitive impairment screening: denies difficulty learning/retaining new information, denies difficulty handling complex tasks, denies difficulty with reasoning, denies difficulty with spatial ability and orientation and denies difficulty with language  Advance Directives: Advance directives: living will, durable power of  for health care directives and advance directives  Co-Managers and Medical Equipment/Suppliers: See Patient Care Team   Reviewed Updated [facility]:   Last Medicare Wellness Visit Information was reviewed, patient interviewed, no change since last [facility]  Preventive Quality Program 65 and Older: Falls Risk: The patient fell 0 times in the past 12 months  The patient is currently asymptomatic Symptoms Include:     The patient currently has no urinary incontinence symptoms  Date of last glaucoma screen was 2017      Patient Care Team    Care Team Member Role Specialty Office Number   [facility]  Gastroenterology Adult [phone]   Aimee Andino MD  Ophthalmology [phone] [phone]     Review of Systems    Constitutional: negative, no fever, no chills, no malaise and no fatigue  Head and Face: negative  Eyes: negative  ENT: negative, no earache, no hearing loss, no nasal congestion and no nasal discharge  Cardiovascular: negative, no chest pain, no palpitations, the heart is not racing and no lightheadedness  Respiratory: negative, no cough and no dry cough  Gastrointestinal: negative, no abdominal pain, no abdominal bloating, no abdominal cramps, no nausea and no vomiting  Genitourinary: negative, no dysuria, no urinary frequency and no urinary urgency  Musculoskeletal: back pain and RIGHT BUTTOCK PAIN; RIGHT HIP PAIN, but negative, as noted in HPI and no generalized muscle aches  Integumentary and Breasts: negative, no rashes and no skin lesions  Neurological: negative, no headache, no confusion and no dizziness  Psychiatric: insomnia and TROUB LE SLEEPING, but negative and no irritability  Endocrine: negative, no hot flashes and no muscle weakness  Hematologic and Lymphatic: negative, no swollen glands and no swollen glands in the neck  Over the past 2 weeks, how often have you been bothered by the following problems? 1 ) Little interest or pleasure in doing things? Not at all    2 ) Feeling down, depressed or hopeless? Not at all    3 ) Trouble falling asleep or sleeping too much? Several days  4 ) Feeling tired or having little energy? Not at all    5 ) Poor appetite or overeating? Not at all    6 ) Feeling bad about yourself, or that you are a failure, or have let yourself or your family down?  Not at all    7 ) Trouble concentrating on things, such as reading a newspaper or watching television? Not at all    8 ) Moving or speaking so slowly that other people could have noticed, or the opposite, moving or speaking faster than usual? Not at all  How difficult have these problems made it for you to do your work, take care of things at home, or get along with people? Not at all  Score       Active Problems    1  Advanced directives, counseling/discussion (V65 49) (Z71 89)   2  Allergic rhinitis (477 9) (J30 9)   3  Encounter for screening mammogram for breast cancer (V76 12) (Z12 31)   4  Hypertension (401 9) (I10)   5  Lentigo Senilis (709 09)   6  Medicare annual wellness visit, subsequent (V70 0) (Z00 00)   7  Need for influenza vaccination (V04 81) (Z23)   8  Osteoarthrosis (715 90) (M19 90)   9  Screening for genitourinary condition (V81 6) (Z13 89)   10  Sebaceous Hyperplasia (706 9)   11  Skin rash (782 1) (R21)   12  Subclinical hypothyroidism (244 8) (E03 9)   13  Trigger finger (727 03) (M65 30)   14  Trochanteric bursitis (726 5) (M70 60)    Past Medical History    · History of RLL pneumonia (486) (J18 1)   · History of Trochanteric bursitis (726 5) (M70 60)    The active problems and past medical history were reviewed and updated today  Surgical History    · History of Repair Of Retinal Detachment Complex    The surgical history was reviewed and updated today  Family History  Mother    · Family history of Coronary disease   · Family history of colon cancer (V16 0) (Z80 0)   · Family history of diabetes mellitus (V18 0) (Z83 3)  Father    · Family history of Coronary disease   · Family history of diabetes mellitus (V18 0) (Z83 3)  Family History    · Family history of Colon Cancer (V16 0)   · Family history of Coronary Artery Disease (V17 49)   · Family history of Type 2 Diabetes Mellitus    The family history was reviewed and updated today         Social History    · Never smoked cigarettes (V49 89) (Z78 9)  The social history was reviewed and updated today  The social history was reviewed and is unchanged  Current Meds   1  B12-Active 1 MG Oral Tablet Chewable; Therapy: (Recorded:11Aug2016) to Recorded   2  Calcium Plus Vitamin D CAPS; Therapy: (Recorded:11Aug2016) to Recorded   3  CVS Loratadine TABS; take 1 tablet daily as needed Recorded   4  CVS Vitamin E 200 UNIT CAPS; Therapy: (Recorded:11Aug2016) to Recorded   5  Fluticasone Propionate 50 MCG/ACT Nasal Suspension; USE 2 SPRAYS IN EACH   NOSTRIL ONCE DAILY; Therapy: 02Ahx5413 to (Last Rx:74Qnq9516)  Requested for: 18Sep2017 Ordered   6  Losartan Potassium-HCTZ 100-12 5 MG Oral Tablet; Take 1 tablet by mouth  daily; Therapy: 30MNW1677 to (Evaluate:08Nov2017)  Requested for: 10Aug2017; Last   Rx:10Aug2017 Ordered   7  Melatonin 3 MG Oral Capsule; Therapy: (Recorded:11Aug2016) to Recorded   8  Meloxicam 7 5 MG Oral Tablet; Take 1 tablet by mouth  every day; Therapy: 33QAG6237 to (Bertis Ore)  Requested for: 10Aug2017; Last   Rx:10Aug2017 Ordered    The medication list was reviewed and updated today  Allergies    1  No Known Drug Allergies    Immunizations   ** Printed in Appendix #1 below  Vitals  Signs    Temperature: 97 9 F, Tympanic  Heart Rate: 78  Respiration: 16  Systolic: 207, LUE, Sitting  Diastolic: 904, LUE, Sitting  Height: 5 ft 5 in  Weight: 163 lb 8 oz  BMI Calculated: 27 21  BSA Calculated: 1 82    Physical Exam    Constitutional   General appearance: No acute distress, well appearing and well nourished  Eyes   Conjunctiva and lids: No swelling, erythema or discharge  Conjunctiva Findings: normal in both eyes  Eye Lids: normal bilaterally  Pupils and irises: Equal, round, reactive to light  Ears, Nose, Mouth, and Throat   External inspection of ears and nose: Normal     Otoscopic examination: Tympanic membranes translucent with normal light reflex  Canals patent without erythema  CLAR B L     Hearing: Normal     Nasal mucosa, septum, and turbinates: Normal without edema or erythema  Lips, teeth, and gums: Normal, good dentition  Oropharynx: Normal with no erythema, edema, exudate or lesions  Inspection of the oropharynx showed fully visible tonsils, uvula and soft palate (Mallampati class 1)  Oral mucosa was moist, but was normal  The palate examination showed no abnormalities  The tongue was normal  The tonsils were normal    Neck   Neck: Supple, symmetric, trachea midline, no masses  Thyroid: Normal, no thyromegaly  NO NODULES  Pulmonary   Respiratory effort: No increased work of breathing or signs of respiratory distress  Percussion of chest: Normal     Palpation of chest: Normal     Auscultation of lungs: Clear to auscultation  Auscultation of the lungs revealed no expiratory wheezing, normal expiratory time and no inspiratory wheezing  no rales or crackles were heard bilaterally  no rhonchi  no friction rub  no wheezing  no diminished breath sounds  no bronchial breath sounds  LEAR B/L  Cardiovascular   Palpation of heart: Normal PMI, no thrills  Auscultation of heart: Normal rate and rhythm, normal S1 and S2, no murmurs  The heart rate was normal  The rhythm was regular  Heart sounds: normal S1, normal S2, no S3 and no S4  no murmurs were heard  REGULAR NO S3 NO S4    Carotid pulses: 2+ bilaterally  Abdominal aorta: Normal     Femoral pulses: 2+ bilaterally  Pedal pulses: 2+ bilaterally  Examination of extremities for edema and/or varicosities: Normal     Abdomen   Abdomen: Non-tender, no masses  Liver and spleen: No hepatomegaly or splenomegaly  Lymphatic   Palpation of lymph nodes in neck: No lymphadenopathy  NO NODES  NO NODES  Musculoskeletal   Gait and station: Normal   RIGHT PAIN AT PSIS; NORMAL FB AND BB;    Digits and nails: Normal without clubbing or cyanosis      Joints, bones, and muscles: Normal     Range of motion: Normal     Stability: Normal     Muscle strength/tone: Normal  Skin   Skin and subcutaneous tissue: Normal without rashes or lesions  Palpation of skin and subcutaneous tissue: Normal turgor  BUNIONS B/L FEET  Neurologic   Cranial nerves: Cranial nerves II-XII intact  Reflexes: 2+ and symmetric  Sensation: No sensory loss  Psychiatric   Judgment and insight: Normal     Orientation to person, place, and time: Normal     Recent and remote memory: Intact  Mood and affect: Normal        Results/Data  *VB - Urinary Incontinence Screen (Dx Z13 89 Screen for UI) [id number] 08:09AM Miladis Gates     Test Name Result Flag Reference   Urinary Incontinence Assessment [id number]       Falls Risk Assessment (Dx Z13 89 Screen for Neurologic Disorder) 48YOK3230 08:08AM User, Ahs     Test Name Result Flag Reference   Falls Risk      No falls in the past year     PHQ-9 Adult Depression Screening 21Nov2017 08:08AM User, Ahs     Test Name Result Flag Reference   PHQ-9 Adult Depression Score 1     Over the last two weeks, how often have you been bothered by any of the following problems? Little interest or pleasure in doing things: Not at all - 0  Feeling down, depressed, or hopeless: Not at all - 0  Trouble falling or staying asleep, or sleeping too much: Several days - 1  Feeling tired or having little energy: Not at all - 0  Poor appetite or over eating: Not at all - 0  Feeling bad about yourself - or that you are a failure or have let yourself or your family down: Not at all - 0  Trouble concentrating on things, such as reading the newspaper or watching television: Not at all - 0  Moving or speaking so slowly that other people could have noticed   Or the opposite -  being so fidgety or restless that you have been moving around a lot more than usual: Not at all - 0  Thoughts that you would be better off dead, or of hurting yourself in some way: Not at all - 0   PHQ-9 Adult Depression Screening Negative     PHQ-9 Difficulty Level Not difficult at all     PHQ-9 Severity Minimal Depression         Signatures   Electronically signed by :  Shiraz 89, DO; 2017  8:40AM EST                       (Author)    Appendix #1     Patient: Miguel Haley ; : 1950; MRN: [id number]      1 2 3 4 5 6    Influenza  26-Sep-2012  (62y) 21-Oct-2013  (63y) 2015  (64y) 2015  (42I) [id number]  (66y) 10-Oct-2017  (67y)    PCV  2015  (64y)         PPSV  2013  (63y)         Tdap  2015  (65y)

## 2018-01-14 VITALS
HEIGHT: 65 IN | WEIGHT: 163.5 LBS | HEART RATE: 78 BPM | BODY MASS INDEX: 27.24 KG/M2 | SYSTOLIC BLOOD PRESSURE: 158 MMHG | TEMPERATURE: 97.9 F | DIASTOLIC BLOOD PRESSURE: 108 MMHG | RESPIRATION RATE: 16 BRPM

## 2018-01-15 VITALS
SYSTOLIC BLOOD PRESSURE: 148 MMHG | WEIGHT: 161.38 LBS | TEMPERATURE: 97.9 F | HEIGHT: 65 IN | HEART RATE: 78 BPM | DIASTOLIC BLOOD PRESSURE: 78 MMHG | RESPIRATION RATE: 16 BRPM | BODY MASS INDEX: 26.89 KG/M2

## 2018-01-23 NOTE — RESULT NOTES
Discussion/Summary   Labs are ok, but cholesterol is significantly higher- start fish oil 1,000 mg daily and repeat in 3-4 months  Verified Results  (1) COMPREHENSIVE METABOLIC PANEL [id number] 51:62OS Julitalacy [place]     Test Name Result Flag Reference   GLUCOSE 95 mg/dL  65-99   Fasting reference interval   UREA NITROGEN (BUN) 20 mg/dL  7-25   CREATININE 1 09 mg/dL H 0 50-0 99   For patients >52years of age, the reference limit  for Creatinine is approximately 13% higher for people  identified as -American  eGFR NON-AFR   AMERICAN 52 mL/min/1 73m2 L > OR = 60   eGFR AFRICAN AMERICAN 60 mL/min/1 73m2  > OR = 60   BUN/CREATININE RATIO 18 (calc)  6-22   SODIUM 140 mmol/L  135-146   POTASSIUM 3 8 mmol/L  3 5-5 3   CHLORIDE 104 mmol/L     CARBON DIOXIDE 28 mmol/L  20-31   CALCIUM 9 7 mg/dL  8 6-10 4   PROTEIN, TOTAL 7 0 g/dL  6 1-8 1   ALBUMIN 4 2 g/dL  3 6-5 1   GLOBULIN 2 8 g/dL (calc)  1 9-3 7   ALBUMIN/GLOBULIN RATIO 1 5 (calc)  1 0-2 5   BILIRUBIN, TOTAL 0 6 mg/dL  0 2-1 2   ALKALINE PHOSPHATASE 65 U/L     AST 22 U/L  10-35   ALT 16 U/L  6-29     (1) CBC/PLT/DIFF [id number] 09:09AM Tenzin Vegas   REPORT COMMENT:  FASTING:YES     Test Name Result Flag Reference   WHITE BLOOD CELL COUNT 7 9 Thousand/uL  3 8-10 8   RED BLOOD CELL COUNT 4 31 Million/uL  3 80-5 10   HEMOGLOBIN 13 5 g/dL  11 7-15 5   HEMATOCRIT 40 9 %  35 0-45 0   MCV 94 9 fL  80 0-100 0   MCH 31 3 pg  27 0-33 0   MCHC 33 0 g/dL  32 0-36 0   RDW 12 2 %  11 0-15 0   PLATELET COUNT 985 Thousand/uL  140-400   ABSOLUTE NEUTROPHILS 3389 cells/uL  9637-5869   ABSOLUTE LYMPHOCYTES 3460 cells/uL  850-3900   ABSOLUTE MONOCYTES 719 cells/uL  200-950   ABSOLUTE EOSINOPHILS 292 cells/uL     ABSOLUTE BASOPHILS 40 cells/uL  0-200   NEUTROPHILS 42 9 %     LYMPHOCYTES 43 8 %     MONOCYTES 9 1 %     EOSINOPHILS 3 7 %     BASOPHILS 0 5 %     MPV 10 0 fL  7 5-12 5     (1) LIPID PANEL, FASTING [id number] 09:09AM [facility]     Test Name Result Flag Reference   CHOLESTEROL, TOTAL 242 mg/dL H <200   HDL CHOLESTEROL 57 mg/dL  >66   TRIGLICERIDES 059 mg/dL H <150   LDL-CHOLESTEROL 151 mg/dL (calc) H    Reference range: <100     Desirable range <100 mg/dL for patients with CHD or  diabetes and <70 mg/dL for diabetic patients with  known heart disease  LDL-C is now calculated using the Amaury-Perez   calculation, which is a validated novel method providing   better accuracy than the Friedewald equation in the   estimation of LDL-C  Dragan Moraes al  [facility]  0278;[phone]   (http://BookingPal/faq/BPN882)   CHOL/HDLC RATIO 4 2 (calc)  <5 0   NON HDL CHOLESTEROL 185 mg/dL (calc) H <130   For patients with diabetes plus 1 major ASCVD risk   factor, treating to a non-HDL-C goal of <100 mg/dL   (LDL-C of <70 mg/dL) is considered a therapeutic   option

## 2018-01-23 NOTE — RESULT NOTES
Discussion/Summary   STABLE MAMMOGRAM- REPEAT IN ONE YEAR     Verified Results  MAMMO SCREENING BILATERAL W 3D & CAD [id number] 12:24PM Jarret Form Order Number: [id number]    - Patient Instructions: To schedule this appointment, please contact Central Scheduling at [phone]  Do not wear any perfume, powder, lotion or deodorant on breast or underarm area  Please bring your doctors order, referral (if needed) and insurance information with you on the day of the test  Failure to bring this information may result in this test being rescheduled  Arrive 15 minutes prior to your appointment time to register  On the day of your test, please bring any prior mammogram or breast studies with you that were not performed at a [facility]  Failure to bring prior exams may result in your test needing to be rescheduled  Test Name Result Flag Reference   MAMMO SCREENING BILATERAL W 3D & CAD (Report)     Patient History:   Patient is postmenopausal and had first child at age 30  Family history of colorectal cancer at age 67 in mother  Took estrogen for 5 years beginning at age 50  Patient has never smoked  Patient's BMI is 25 8  Reason for exam: screening, asymptomatic  Mammo Screening Bilateral W DBT and CAD: December 4, 2017 - Check   In #: [de-identified]   2D/3D Procedure   3D views: Bilateral MLO view(s) were taken  2D views: Bilateral CC view(s) were taken  Technologist: RT Lilian(JULISSA)(M)   Prior study comparison: November 28, 2016, mammo screening    bilateral W CAD performed at [facility]  November 25, 2015, right breast unilateral limited RBC    UP, performed at [address] [place] [address]  November 25, 2015, right breast unilateral diagnostic mammogram, performed at   [address] [place] [address]  November 20, 2015, bilateral   digital screening mammogram performed at [address] [place] [address]   October 1, 2014, bilateral digital screening    mammogram performed at [facility]  The breast tissue is almost entirely fat  Bilateral digital mammography was performed  No dominant soft    tissue mass, architectural distortion or suspicious    calcifications are noted in either breast  The skin and nipple    contours are within normal limits  No significant changes when compared with prior studies  ACR BI-RADSï¾® Assessments: BiRad:1 - Negative     Recommendation:   Routine screening mammogram of both breasts in 1 year  A    reminder letter will be scheduled  The patient is scheduled in a reminder system for screening    mammography  8-10% of cancers will be missed on mammography  Management of a    palpable abnormality must be based on clinical grounds  Patients    will be notified of their results via letter from our facility  Accredited by [employer] of Radiology and FDA       Transcription Location: [facility] 98: [id number]     Risk Value(s):   Tyrer-Cuzick 10 Year: 3 300%, Tyrer-Cuzick Lifetime: 5 600%,    Myriad Table: 1 5%, ALBERTINA 5 Year: 2 2%, NCI Lifetime: 6 6%

## 2018-02-07 ENCOUNTER — TELEPHONE (OUTPATIENT)
Dept: FAMILY MEDICINE CLINIC | Facility: CLINIC | Age: 68
End: 2018-02-07

## 2018-02-07 DIAGNOSIS — R39.15 URINARY URGENCY: Primary | ICD-10-CM

## 2018-02-07 RX ORDER — CIPROFLOXACIN 500 MG/1
500 TABLET, FILM COATED ORAL EVERY 12 HOURS SCHEDULED
Qty: 10 TABLET | Refills: 0 | Status: SHIPPED | OUTPATIENT
Start: 2018-02-07 | End: 2018-02-12

## 2018-02-07 NOTE — TELEPHONE ENCOUNTER
RX was sent to wrong pharmacy  [facility] and [facility] it and called into correct pharmacy in [place]

## 2018-02-26 DIAGNOSIS — M25.50 ARTHRALGIA, UNSPECIFIED JOINT: Primary | ICD-10-CM

## 2018-02-26 RX ORDER — MELOXICAM 7.5 MG/1
TABLET ORAL
Qty: 90 TABLET | Refills: 1 | Status: SHIPPED | OUTPATIENT
Start: 2018-02-26 | End: 2018-05-22 | Stop reason: ALTCHOICE

## 2018-03-18 DIAGNOSIS — I10 ESSENTIAL HYPERTENSION: Primary | ICD-10-CM

## 2018-03-19 RX ORDER — VALSARTAN 320 MG/1
TABLET ORAL
Qty: 30 TABLET | Refills: 3 | Status: SHIPPED | OUTPATIENT
Start: 2018-03-19 | End: 2018-06-21

## 2018-05-21 RX ORDER — LORATADINE 10 MG/1
1 TABLET ORAL DAILY PRN
COMMUNITY
End: 2020-07-15 | Stop reason: ALTCHOICE

## 2018-05-21 RX ORDER — ZOLPIDEM TARTRATE 10 MG/1
1 TABLET ORAL
COMMUNITY
Start: 2017-11-21 | End: 2018-06-21 | Stop reason: SDUPTHER

## 2018-05-21 RX ORDER — VITAMIN E 268 MG
1 CAPSULE ORAL DAILY
COMMUNITY
End: 2020-07-15 | Stop reason: ALTCHOICE

## 2018-05-21 RX ORDER — FLUTICASONE PROPIONATE 50 MCG
2 SPRAY, SUSPENSION (ML) NASAL DAILY
COMMUNITY
Start: 2017-09-18 | End: 2018-09-25 | Stop reason: SDUPTHER

## 2018-05-21 RX ORDER — GLUC/MSM/COLGN2/HYAL/ANTIARTH3 375-375-20
1 TABLET ORAL DAILY
COMMUNITY

## 2018-05-22 ENCOUNTER — OFFICE VISIT (OUTPATIENT)
Dept: FAMILY MEDICINE CLINIC | Facility: CLINIC | Age: 68
End: 2018-05-22
Payer: MEDICARE

## 2018-05-22 VITALS
DIASTOLIC BLOOD PRESSURE: 82 MMHG | BODY MASS INDEX: 27.66 KG/M2 | WEIGHT: 166 LBS | HEIGHT: 65 IN | HEART RATE: 80 BPM | SYSTOLIC BLOOD PRESSURE: 162 MMHG | TEMPERATURE: 97.3 F

## 2018-05-22 DIAGNOSIS — M79.671 FOOT PAIN, BILATERAL: ICD-10-CM

## 2018-05-22 DIAGNOSIS — M15.9 PRIMARY OSTEOARTHRITIS INVOLVING MULTIPLE JOINTS: ICD-10-CM

## 2018-05-22 DIAGNOSIS — M70.61 TROCHANTERIC BURSITIS OF RIGHT HIP: ICD-10-CM

## 2018-05-22 DIAGNOSIS — M79.672 FOOT PAIN, BILATERAL: ICD-10-CM

## 2018-05-22 DIAGNOSIS — E78.01 FAMILIAL HYPERCHOLESTEROLEMIA: Primary | ICD-10-CM

## 2018-05-22 DIAGNOSIS — I10 ESSENTIAL HYPERTENSION: ICD-10-CM

## 2018-05-22 PROCEDURE — 20610 DRAIN/INJ JOINT/BURSA W/O US: CPT | Performed by: INTERNAL MEDICINE

## 2018-05-22 PROCEDURE — 99214 OFFICE O/P EST MOD 30 MIN: CPT | Performed by: INTERNAL MEDICINE

## 2018-05-22 RX ORDER — MELATONIN
1000 DAILY
COMMUNITY

## 2018-05-22 RX ORDER — CHOLECALCIFEROL (VITAMIN D3) 125 MCG
500 CAPSULE ORAL DAILY
COMMUNITY

## 2018-05-22 RX ORDER — HYDROCHLOROTHIAZIDE 12.5 MG/1
12.5 CAPSULE, GELATIN COATED ORAL EVERY MORNING
Qty: 30 CAPSULE | Refills: 0 | Status: SHIPPED | OUTPATIENT
Start: 2018-05-22 | End: 2018-06-21

## 2018-05-22 RX ORDER — METHYLPREDNISOLONE ACETATE 40 MG/ML
40 INJECTION, SUSPENSION INTRA-ARTICULAR; INTRALESIONAL; INTRAMUSCULAR; SOFT TISSUE ONCE
Status: COMPLETED | OUTPATIENT
Start: 2018-05-22 | End: 2018-05-22

## 2018-05-22 RX ORDER — AMOXICILLIN 500 MG
1 CAPSULE ORAL DAILY
COMMUNITY
End: 2020-09-28

## 2018-05-22 RX ADMIN — METHYLPREDNISOLONE ACETATE 40 MG: 40 INJECTION, SUSPENSION INTRA-ARTICULAR; INTRALESIONAL; INTRAMUSCULAR; SOFT TISSUE at 09:30

## 2018-05-22 NOTE — ASSESSMENT & PLAN NOTE
Continue valsartan 320  Add hctz 12 5 in am  Follow upin 2-4 weeks to check cmp and bp   If better consider starting meloxicam

## 2018-05-22 NOTE — PROGRESS NOTES
ASSESSMENT and PLAN:  Delores Henderson is a 69 y o  female with:   Problem List Items Addressed This Visit     Osteoarthrosis     Add glucosamine  Repeat bp  If better consider meloxicam          Trochanteric bursitis     s/p injectionright hip- ice to area         Relevant Orders    Arthrocentesis    Familial hypercholesterolemia - Primary    Relevant Orders    Lipid panel    Comprehensive metabolic panel    Foot pain, bilateral    Relevant Orders    Ambulatory referral to Podiatry    Essential hypertension     Continue valsartan 320  Add hctz 12 5 in am  Follow upin 2-4 weeks to check cmp and bp   If better consider starting meloxicam          Relevant Medications    hydrochlorothiazide (MICROZIDE) 12 5 mg capsule        Large joint arthrocentesis  Date/Time: 5/22/2018 10:04 AM  Consent given by: patient  Supporting Documentation  Indications: pain   Procedure Details  Location: hip - R greater trochanteric bursa  Needle size: 22 G  Ultrasound guidance: no  Approach: lateral    Patient tolerance: patient tolerated the procedure well with no immediate complications  Dressing:  Sterile dressing applied      SUBJECTIVE:  Delores Henderson is a 69 y o  female who presents today with a chief complaint of Hypertension (6 month follow up); Hip Pain (right); and Foot Swelling (arch pain)    Patient here for follow up  She has hip pain and pain in the arch of her foot  Her blood pressure is elevated  She had labs in January-  Her lipids were elevated  She has right hip pain- awakens her- she was awoken with pain in right gtreat toe  she has pain across her arches b/l;  She wears shoes with arch support; she used otc arch support  She has pain across topof her foot - right more than left; She has hx of bunions   She uses a natural remedy for sleep and it is working well; she has not used [place] as of yet  Review of Systems   Constitutional: Negative  HENT: Negative  Eyes: Negative      Respiratory: Negative  Cardiovascular: Negative  Gastrointestinal: Negative  Endocrine: Negative  Genitourinary: Negative  Musculoskeletal: Positive for arthralgias (right hip pain,  pain across tops of feet; ) and gait problem  Negative for back pain, joint swelling, myalgias and neck pain  Skin: Negative  Allergic/Immunologic: Negative  Hematological: Negative  Psychiatric/Behavioral: Negative  I have reviewed the patient's PMH, Social History, Medication List and Allergies  OBJECTIVE:  /82   Pulse 80   Temp (!) 97 3 °F (36 3 °C)   Ht 5' 5" (1 651 m)   Wt 75 3 kg (166 lb)   Breastfeeding? No   BMI 27 62 kg/m²   Physical Exam   Constitutional: She is oriented to person, place, and time  Eyes: Conjunctivae and EOM are normal  Pupils are equal, round, and reactive to light  Cardiovascular: Normal rate, regular rhythm, normal heart sounds and intact distal pulses  Exam reveals no gallop and no friction rub  No murmur heard  Pulmonary/Chest: Effort normal and breath sounds normal  No respiratory distress  She has no wheezes  She has no rales  Abdominal: Soft  Bowel sounds are normal  She exhibits no distension  There is no tenderness  There is no rebound and no guarding  Musculoskeletal: Normal range of motion  Neurological: She is alert and oriented to person, place, and time  She has normal reflexes  No cranial nerve deficit  Coordination normal    Skin: Skin is warm and dry  No rash noted  No erythema  Psychiatric: She has a normal mood and affect  Her behavior is normal  Judgment and thought content normal    Nursing note and vitals reviewed

## 2018-05-22 NOTE — PATIENT INSTRUCTIONS
Call dr Arcelia Thakkar for an appt for feet  Add otc glucosamine chondroitin sulfate for joints  Add hydrochlrothiazide in the am   Labs in 2 weeks  Ov in 2-4 weeks to assess bp and kidney function

## 2018-06-12 ENCOUNTER — APPOINTMENT (OUTPATIENT)
Dept: LAB | Facility: CLINIC | Age: 68
End: 2018-06-12
Payer: MEDICARE

## 2018-06-12 DIAGNOSIS — E78.01 FAMILIAL HYPERCHOLESTEROLEMIA: ICD-10-CM

## 2018-06-12 LAB
ALBUMIN SERPL BCP-MCNC: 3.7 G/DL (ref 3.5–5)
ALP SERPL-CCNC: 65 U/L (ref 46–116)
ALT SERPL W P-5'-P-CCNC: 20 U/L (ref 12–78)
ANION GAP SERPL CALCULATED.3IONS-SCNC: 7 MMOL/L (ref 4–13)
AST SERPL W P-5'-P-CCNC: 17 U/L (ref 5–45)
BILIRUB SERPL-MCNC: 0.63 MG/DL (ref 0.2–1)
BUN SERPL-MCNC: 19 MG/DL (ref 5–25)
CALCIUM SERPL-MCNC: 10.1 MG/DL (ref 8.3–10.1)
CHLORIDE SERPL-SCNC: 104 MMOL/L (ref 100–108)
CHOLEST SERPL-MCNC: 202 MG/DL (ref 50–200)
CO2 SERPL-SCNC: 31 MMOL/L (ref 21–32)
CREAT SERPL-MCNC: 1 MG/DL (ref 0.6–1.3)
GFR SERPL CREATININE-BSD FRML MDRD: 58 ML/MIN/1.73SQ M
GLUCOSE P FAST SERPL-MCNC: 83 MG/DL (ref 65–99)
HDLC SERPL-MCNC: 60 MG/DL (ref 40–60)
LDLC SERPL CALC-MCNC: 118 MG/DL (ref 0–100)
NONHDLC SERPL-MCNC: 142 MG/DL
POTASSIUM SERPL-SCNC: 4 MMOL/L (ref 3.5–5.3)
PROT SERPL-MCNC: 7.9 G/DL (ref 6.4–8.2)
SODIUM SERPL-SCNC: 142 MMOL/L (ref 136–145)
TRIGL SERPL-MCNC: 122 MG/DL

## 2018-06-12 PROCEDURE — 36415 COLL VENOUS BLD VENIPUNCTURE: CPT

## 2018-06-12 PROCEDURE — 80053 COMPREHEN METABOLIC PANEL: CPT

## 2018-06-12 PROCEDURE — 80061 LIPID PANEL: CPT

## 2018-06-21 ENCOUNTER — OFFICE VISIT (OUTPATIENT)
Dept: FAMILY MEDICINE CLINIC | Facility: CLINIC | Age: 68
End: 2018-06-21
Payer: MEDICARE

## 2018-06-21 VITALS
RESPIRATION RATE: 16 BRPM | WEIGHT: 163 LBS | BODY MASS INDEX: 27.12 KG/M2 | SYSTOLIC BLOOD PRESSURE: 128 MMHG | TEMPERATURE: 98.4 F | HEART RATE: 66 BPM | DIASTOLIC BLOOD PRESSURE: 84 MMHG

## 2018-06-21 DIAGNOSIS — M79.671 FOOT PAIN, BILATERAL: ICD-10-CM

## 2018-06-21 DIAGNOSIS — I10 ESSENTIAL HYPERTENSION: Primary | ICD-10-CM

## 2018-06-21 DIAGNOSIS — F51.01 PRIMARY INSOMNIA: ICD-10-CM

## 2018-06-21 DIAGNOSIS — M70.61 TROCHANTERIC BURSITIS OF RIGHT HIP: ICD-10-CM

## 2018-06-21 DIAGNOSIS — E78.01 FAMILIAL HYPERCHOLESTEROLEMIA: ICD-10-CM

## 2018-06-21 DIAGNOSIS — M79.672 FOOT PAIN, BILATERAL: ICD-10-CM

## 2018-06-21 PROCEDURE — 99214 OFFICE O/P EST MOD 30 MIN: CPT | Performed by: INTERNAL MEDICINE

## 2018-06-21 RX ORDER — VALSARTAN AND HYDROCHLOROTHIAZIDE 320; 12.5 MG/1; MG/1
1 TABLET, FILM COATED ORAL DAILY
Qty: 90 TABLET | Refills: 3 | Status: SHIPPED | OUTPATIENT
Start: 2018-06-21 | End: 2019-01-17 | Stop reason: SDUPTHER

## 2018-06-21 RX ORDER — ZOLPIDEM TARTRATE 10 MG/1
TABLET ORAL
Qty: 90 TABLET | Refills: 0 | Status: SHIPPED | OUTPATIENT
Start: 2018-06-21 | End: 2019-01-17 | Stop reason: SDUPTHER

## 2018-06-21 NOTE — ASSESSMENT & PLAN NOTE
Refill valsartan and hctz - combine into one pill  Labs stable  Repeat in 4 [facility]

## 2018-06-21 NOTE — PROGRESS NOTES
ASSESSMENT and PLAN:  Ann Singletary is a 69 y o  female with:   Problem List Items Addressed This Visit     Trochanteric bursitis     S/p injection  Worse after abnormal gait due to foot pain  Trail of diclofenac for pain         Familial hypercholesterolemia     Lipids stable         Relevant Orders    Comprehensive metabolic panel    Lipid panel    Foot pain, bilateral     Sees podiatry  D/c meloxicam  strial of diclofenac 50 ec po bid with food           Relevant Medications    diclofenac sodium (VOLTAREN) 50 mg EC tablet    Essential hypertension - Primary     Refill valsartan and hctz - combine into one pill  Labs stable  Repeat in 4 [facility]         Relevant Medications    valsartan-hydrochlorothiazide (DIOVAN-HCT) 320-12 5 MG per tablet    Other Relevant Orders    Comprehensive metabolic panel    Lipid panel    Primary insomnia     Refill ambien 10 mg at hs- 90 day supply given to pt         Relevant Medications    zolpidem (AMBIEN) 10 mg tablet          SUBJECTIVE:  Ann Singletary is a 69 y o  female who presents today with a chief complaint of Follow-up (lab results) and Hypertension    Patient here for follow up  Her labs were reviweed  She is off of meloxicam   She uses aleve around 3 pm when her back hurts  She has foot pain  She uses ambien prn for sleep  She was seen by podiatry      Review of Systems   Constitutional: Negative  HENT: Negative  Eyes: Negative  Respiratory: Negative  Cardiovascular: Negative  Gastrointestinal: Negative  Endocrine: Negative  Genitourinary: Negative  Musculoskeletal: Positive for arthralgias  Negative for back pain  Allergic/Immunologic: Negative  Neurological: Negative  Hematological: Negative  Psychiatric/Behavioral: Positive for sleep disturbance  I have reviewed the patient's PMH, Social History, Medication List and Allergies        OBJECTIVE:  /84   Pulse 66   Temp 98 4 °F (36 9 °C)   Resp 16   Wt 73 9 kg (163 lb) BMI 27 12 kg/m²   Physical Exam   Constitutional: She is oriented to person, place, and time  She appears well-developed and well-nourished  HENT:   Head: Normocephalic and atraumatic  Right Ear: External ear normal    Left Ear: External ear normal    Nose: Nose normal    Mouth/Throat: Oropharynx is clear and moist    Eyes: Conjunctivae and EOM are normal  Pupils are equal, round, and reactive to light  Neck: Normal range of motion  No JVD present  No tracheal deviation present  No thyromegaly present  Cardiovascular: Normal rate, regular rhythm, normal heart sounds and intact distal pulses  Pulmonary/Chest: Effort normal and breath sounds normal  She has no wheezes  Abdominal: Soft  Bowel sounds are normal  She exhibits no distension  There is no tenderness  There is no rebound  Musculoskeletal: Normal range of motion  She exhibits tenderness  She exhibits no edema  Neurological: She is alert and oriented to person, place, and time  She has normal reflexes  Coordination (pain in feet ) normal    Skin: Skin is warm and dry  Psychiatric: Her behavior is normal  Judgment and thought content normal    Nursing note and vitals reviewed

## 2018-08-08 ENCOUNTER — OFFICE VISIT (OUTPATIENT)
Dept: FAMILY MEDICINE CLINIC | Facility: CLINIC | Age: 68
End: 2018-08-08
Payer: MEDICARE

## 2018-08-08 VITALS
SYSTOLIC BLOOD PRESSURE: 118 MMHG | TEMPERATURE: 97.4 F | RESPIRATION RATE: 16 BRPM | DIASTOLIC BLOOD PRESSURE: 74 MMHG | HEART RATE: 88 BPM | BODY MASS INDEX: 26.99 KG/M2 | WEIGHT: 162.2 LBS

## 2018-08-08 DIAGNOSIS — M70.61 GREATER TROCHANTERIC BURSITIS OF RIGHT HIP: Primary | ICD-10-CM

## 2018-08-08 PROCEDURE — 99213 OFFICE O/P EST LOW 20 MIN: CPT | Performed by: INTERNAL MEDICINE

## 2018-08-08 PROCEDURE — 20610 DRAIN/INJ JOINT/BURSA W/O US: CPT | Performed by: INTERNAL MEDICINE

## 2018-08-08 RX ORDER — PREDNISONE 10 MG/1
TABLET ORAL
COMMUNITY
Start: 2018-07-12 | End: 2018-08-08

## 2018-08-08 RX ADMIN — METHYLPREDNISOLONE ACETATE 80 MG: 80 INJECTION, SUSPENSION INTRA-ARTICULAR; INTRALESIONAL; INTRAMUSCULAR; SOFT TISSUE at 15:30

## 2018-08-08 NOTE — PROGRESS NOTES
patiASSESSMENT and PLAN:  Ann Singletary is a 69 y o  female with:   Problem List Items Addressed This Visit     Greater trochanteric bursitis of right hip - Primary     Right hip injected with relief of sx  Follow up in sept   Consider injection left hip if it continues to be symotmatic          Relevant Medications    diclofenac sodium (VOLTAREN) 1 %    methylPREDNISolone acetate (DEPO-MEDROL) injection 80 mg (Start on 8/9/2018  7:45 AM)      Large joint arthrocentesis  Date/Time: 8/8/2018 3:32 PM  Consent given by: patient  Site marked: site marked  Timeout: Immediately prior to procedure a time out was called to verify the correct patient, procedure, equipment, support staff and site/side marked as required   Supporting Documentation  Indications: pain   Procedure Details  Location: hip - R greater trochanteric bursa  Preparation: Patient was prepped and draped in the usual sterile fashion  Needle size: 22 G  Ultrasound guidance: no  Approach: lateral            SUBJECTIVE:  Ann Singletary is a 69 y o  female who presents today with a chief complaint of Hip Pain (Right)    Patient here for hip pain; She has seen podiatry she has right hip pain  She has not been taking oral diclofenac due to a fear of sodium  She thinks it helped when she took it  She would like to use voltaren gel       Review of Systems   Constitutional: Negative  HENT: Negative  Respiratory: Negative  Cardiovascular: Negative  Gastrointestinal: Negative  Endocrine: Negative  Musculoskeletal: Positive for arthralgias (hip pain on right; minimal pain on left; pain on palpation; antalgic gait at times) and gait problem  Hematological: Negative  Psychiatric/Behavioral: Negative  I have reviewed the patient's PMH, Social History, Medication List and Allergies        OBJECTIVE:  /74 (BP Location: Left arm, Patient Position: Sitting, Cuff Size: Large)   Pulse 88   Temp (!) 97 4 °F (36 3 °C) (Tympanic) Resp 16   Wt 73 6 kg (162 lb 3 2 oz)   BMI 26 99 kg/m²   Physical Exam   Constitutional: She is oriented to person, place, and time  She appears well-developed and well-nourished  HENT:   Head: Normocephalic and atraumatic  Neck: Normal range of motion  Cardiovascular: Normal rate  Pulmonary/Chest: Effort normal and breath sounds normal    Musculoskeletal: She exhibits tenderness (pain right greater trochanter; minimal pain at lesser trochanter;  normal rom of hip ; normal fb and bb; no rash;  left hip with some pain at greater trochanter  normal rom of left hip )  Neurological: She is alert and oriented to person, place, and time  Skin: Skin is warm and dry  Psychiatric: She has a normal mood and affect  Her behavior is normal    Nursing note and vitals reviewed

## 2018-08-09 RX ORDER — METHYLPREDNISOLONE ACETATE 80 MG/ML
80 INJECTION, SUSPENSION INTRA-ARTICULAR; INTRALESIONAL; INTRAMUSCULAR; SOFT TISSUE ONCE
Status: COMPLETED | OUTPATIENT
Start: 2018-08-09 | End: 2018-08-08

## 2018-08-09 NOTE — ASSESSMENT & PLAN NOTE
Right hip injected with relief of sx  Follow up in sept   Consider injection left hip if it continues to be symotmatic

## 2018-08-14 ENCOUNTER — OFFICE VISIT (OUTPATIENT)
Dept: FAMILY MEDICINE CLINIC | Facility: CLINIC | Age: 68
End: 2018-08-14
Payer: MEDICARE

## 2018-08-14 VITALS
BODY MASS INDEX: 27.63 KG/M2 | HEIGHT: 65 IN | DIASTOLIC BLOOD PRESSURE: 88 MMHG | HEART RATE: 100 BPM | SYSTOLIC BLOOD PRESSURE: 118 MMHG | TEMPERATURE: 98.2 F | WEIGHT: 165.8 LBS

## 2018-08-14 DIAGNOSIS — A69.20 LYME DISEASE: Primary | ICD-10-CM

## 2018-08-14 DIAGNOSIS — A69.20 ERYTHEMA MIGRANS (LYME DISEASE): ICD-10-CM

## 2018-08-14 PROCEDURE — 10120 INC&RMVL FB SUBQ TISS SMPL: CPT | Performed by: INTERNAL MEDICINE

## 2018-08-14 PROCEDURE — 99213 OFFICE O/P EST LOW 20 MIN: CPT | Performed by: INTERNAL MEDICINE

## 2018-08-14 RX ORDER — DOXYCYCLINE HYCLATE 100 MG/1
100 TABLET, DELAYED RELEASE ORAL 2 TIMES DAILY
Qty: 42 TABLET | Refills: 0 | Status: SHIPPED | OUTPATIENT
Start: 2018-08-14 | End: 2018-09-04

## 2018-08-14 NOTE — PROGRESS NOTES
ASSESSMENT and PLAN:  Rigoberto Manuel is a 69 y o  female with:   Problem List Items Addressed This Visit     Erythema migrans (Lyme disease) - Primary     Check lyme titer  Tick removed  Start doxy for 21 days  Call if no better          Relevant Medications    doxycycline (DORYX) 100 MG EC tablet          SUBJECTIVE:  Rigoberto Manuel is a 69 y o  female who presents today with a chief complaint of Tick Removal (tick lodged on upper right shoulder blade) and Rash (Right shoulder and right breast)    Patient with tick bite- tick is intact - and rash on right shoulder - downt to right breast, and on right breast- red raised, itchy       Review of Systems   Constitutional: Negative  Respiratory: Negative  Musculoskeletal: Positive for arthralgias (pain in hands and fingers )  Skin: Positive for rash (red raised rash right ant chest wall 15 cm by 13 cm ; raised area right breast 5 cm in diameter )  Foreign body removal  Date/Time: 8/14/2018 2:20 PM  Performed by: Ruth Mathews  Authorized by: Nigel Garay Protocol:Consent: Verbal consent obtained  Consent given by: patient  Patient understanding: patient states understanding of the procedure being performed  Patient consent: the patient's understanding of the procedure matches consent given  Procedure consent: procedure consent matches procedure scheduled  Patient identity confirmed: verbally with patient    Body area: skin  General location: trunk    Sedation:  Patient sedated: no  Localization method: visualized  Removal mechanism: forceps  Tendon involvement: none  Complexity: simple  Post-procedure assessment: foreign body removed  Comments: Engorged tick removed from right shioulder       I have reviewed the patient's PMH, Social History, Medication List and Allergies        OBJECTIVE:  /88 (BP Location: Left arm, Patient Position: Sitting, Cuff Size: Large)   Pulse 100   Temp 98 2 °F (36 8 °C)   Ht 5' 5" (1 651 m)   Wt 75 2 kg (165 lb 12 8 oz)   Breastfeeding? No   BMI 27 59 kg/m²   Physical Exam   Pulmonary/Chest: Effort normal and breath sounds normal    Skin:   Tick embedded right post shoulder;  - removed;  klarge reddened area right shoulder and rgith breast- see description   Nursing note and vitals reviewed

## 2018-08-16 ENCOUNTER — TELEPHONE (OUTPATIENT)
Dept: FAMILY MEDICINE CLINIC | Facility: CLINIC | Age: 68
End: 2018-08-16

## 2018-08-16 LAB — B BURGDOR AB SER IA-ACNC: <0.9 INDEX

## 2018-08-16 NOTE — TELEPHONE ENCOUNTER
Spoke with pt, gave message  She is doing ok and she said she has a follow up appointment with you in September  If you wqant to re-check the blood test , you can do it then ----- Message from Pankaj Georges,  sent at 8/16/2018 12:12 PM EDT -----  Please call the patient regarding her abnormal result  Lyme was neg but the rash was there- is she doing ok on the abx?
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
## 2018-09-18 ENCOUNTER — APPOINTMENT (OUTPATIENT)
Dept: LAB | Facility: CLINIC | Age: 68
End: 2018-09-18
Payer: MEDICARE
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
## 2018-09-18 PROCEDURE — 36415 COLL VENOUS BLD VENIPUNCTURE: CPT

## 2018-09-18 PROCEDURE — 86617 LYME DISEASE ANTIBODY: CPT

## 2018-09-18 PROCEDURE — 86618 LYME DISEASE ANTIBODY: CPT

## 2018-09-19 LAB
B BURGDOR IGG SER IA-ACNC: 0.76
B BURGDOR IGM SER IA-ACNC: 13.44

## 2018-09-20 LAB

## 2018-09-21 ENCOUNTER — TELEPHONE (OUTPATIENT)
Dept: FAMILY MEDICINE CLINIC | Facility: CLINIC | Age: 68
End: 2018-09-21

## 2018-09-21 NOTE — TELEPHONE ENCOUNTER
Patient notified of message per Dr Dariela Chavez  States she will be coming in next week for an appointment and will  lab slip then

## 2018-09-25 PROBLEM — M70.62 TROCHANTERIC BURSITIS OF LEFT HIP: Status: ACTIVE | Noted: 2018-09-25

## 2018-09-25 PROBLEM — Z12.11 SCREEN FOR COLON CANCER: Status: ACTIVE | Noted: 2018-09-25

## 2018-09-25 PROBLEM — L57.0 ACTINIC KERATOSES: Status: ACTIVE | Noted: 2018-09-25

## 2018-10-31 ENCOUNTER — TRANSCRIBE ORDERS (OUTPATIENT)
Dept: LAB | Facility: CLINIC | Age: 68
End: 2018-10-31

## 2018-10-31 ENCOUNTER — APPOINTMENT (OUTPATIENT)
Dept: LAB | Facility: CLINIC | Age: 68
End: 2018-10-31
Payer: MEDICARE

## 2018-10-31 DIAGNOSIS — A69.20 ERYTHEMA MIGRANS (LYME DISEASE): ICD-10-CM

## 2018-10-31 PROCEDURE — 86617 LYME DISEASE ANTIBODY: CPT

## 2018-10-31 PROCEDURE — 36415 COLL VENOUS BLD VENIPUNCTURE: CPT

## 2018-10-31 PROCEDURE — 86618 LYME DISEASE ANTIBODY: CPT

## 2018-11-01 LAB
B BURGDOR IGG SER IA-ACNC: 1.1
B BURGDOR IGM SER IA-ACNC: 3.11

## 2018-11-03 LAB

## 2018-12-19 ENCOUNTER — OFFICE VISIT (OUTPATIENT)
Dept: FAMILY MEDICINE CLINIC | Facility: CLINIC | Age: 68
End: 2018-12-19
Payer: MEDICARE

## 2018-12-19 VITALS
HEIGHT: 65 IN | BODY MASS INDEX: 27.82 KG/M2 | SYSTOLIC BLOOD PRESSURE: 120 MMHG | RESPIRATION RATE: 16 BRPM | HEART RATE: 80 BPM | WEIGHT: 167 LBS | TEMPERATURE: 98.6 F | DIASTOLIC BLOOD PRESSURE: 84 MMHG

## 2018-12-19 DIAGNOSIS — B35.1 FUNGAL TOENAIL INFECTION: ICD-10-CM

## 2018-12-19 DIAGNOSIS — Z13.220 SCREENING FOR HYPERLIPIDEMIA: ICD-10-CM

## 2018-12-19 DIAGNOSIS — B35.1 TOENAIL FUNGUS: ICD-10-CM

## 2018-12-19 DIAGNOSIS — Z12.11 SCREEN FOR COLON CANCER: ICD-10-CM

## 2018-12-19 DIAGNOSIS — E03.8 SUBCLINICAL HYPOTHYROIDISM: ICD-10-CM

## 2018-12-19 DIAGNOSIS — M21.612 BUNION OF GREAT TOE OF LEFT FOOT: ICD-10-CM

## 2018-12-19 DIAGNOSIS — Z78.0 POST-MENOPAUSAL: ICD-10-CM

## 2018-12-19 DIAGNOSIS — Z13.1 SCREENING FOR DIABETES MELLITUS: ICD-10-CM

## 2018-12-19 DIAGNOSIS — Z00.00 ENCOUNTER FOR WELLNESS EXAMINATION IN ADULT: ICD-10-CM

## 2018-12-19 DIAGNOSIS — M15.9 PRIMARY OSTEOARTHRITIS INVOLVING MULTIPLE JOINTS: ICD-10-CM

## 2018-12-19 DIAGNOSIS — N60.11 FIBROCYSTIC BREAST CHANGES, RIGHT: ICD-10-CM

## 2018-12-19 DIAGNOSIS — I10 ESSENTIAL HYPERTENSION: Primary | ICD-10-CM

## 2018-12-19 DIAGNOSIS — F51.01 PRIMARY INSOMNIA: ICD-10-CM

## 2018-12-19 DIAGNOSIS — Z12.39 SCREENING FOR BREAST CANCER: ICD-10-CM

## 2018-12-19 DIAGNOSIS — R53.83 OTHER FATIGUE: ICD-10-CM

## 2018-12-19 PROBLEM — G47.8 AWAKENS FROM SLEEP AT NIGHT: Status: ACTIVE | Noted: 2017-11-21

## 2018-12-19 PROCEDURE — 99214 OFFICE O/P EST MOD 30 MIN: CPT | Performed by: NURSE PRACTITIONER

## 2018-12-19 RX ORDER — TERBINAFINE HYDROCHLORIDE 250 MG/1
250 TABLET ORAL DAILY
Qty: 56 TABLET | Refills: 0 | Status: SHIPPED | OUTPATIENT
Start: 2018-12-19 | End: 2019-02-11 | Stop reason: SDUPTHER

## 2018-12-19 NOTE — PROGRESS NOTES
Assessment/Plan:    No problem-specific Assessment & Plan notes found for this encounter  Diagnoses and all orders for this visit:    Essential hypertension  Comments:  Bp 126/74 pt maintained on Losartan and would like to stay on this medication  Orders:  -     CBC and differential; Future  -     UA w Reflex to Microscopic w Reflex to Culture    Encounter for wellness examination in adult  Comments:  Completed today    Toenail fungus  Comments:  Rx fror Lamisil provided  Orders:  -     Ambulatory referral to Podiatry; Future    Primary insomnia  Comments:  Ambien 1/2 tab reordered- pt reports effectiveness with sleeping with this medication    Bunion of great toe of left foot  Comments:  Referred to Podiatry    Primary osteoarthritis involving multiple joints  Comments:  Pt currently using Diclofenac with some relief  Screening for breast cancer  Comments:  Mammogram ordered  Orders:  -     Mammo screening bilateral w 3d & cad; Future    Post-menopausal  -     DXA bone density spine hip and pelvis; Future    Fungal toenail infection  -     terbinafine (LamISIL) 250 mg tablet; Take 1 tablet (250 mg total) by mouth daily for 56 days    Screening for hyperlipidemia  Comments:  Labwork ordered  Orders:  -     Lipid panel; Future    Screening for diabetes mellitus  Comments:  Labwork ordered  Orders:  -     Comprehensive metabolic panel    Subclinical hypothyroidism  Comments:  Labwork ordered  Orders:  -     TSH, 3rd generation with Free T4 reflex; Future    Screen for colon cancer  Comments:  Referred to GI in [facility] per pt request due to location    Other fatigue  Comments:  Labwork ordered    Fibrocystic breast changes, right  Comments:  Mammogram ordered  Orders:  -     Mammo diagnostic left w 3d & cad; Future          Subjective:      Patient ID: Rachael Meehan is a 77 y o  female  77 yr old female here to [facility] care  Pt has moved [facility] to the [facility] approx 1 5 yrs ago  Pt only complaint today is right great toe pain with bunion, pain and discomfort in her shoes  Pt also c/o bilateral hip pain with no aggravating factors, has been using Diclofenac with minimal relief and would like to try steroid injections at her next visit  Pt thinks its arthritis in nature  Pt also c/o suspect fungal infection all 5 toenails right foot  Pt states she has used Terbinafine in the past with great success  I advised pt to start for 8 weeks and then do her labwork and we shall reevaluate effectiveness at that time  Otherwise pt is AAOX3, very active, does with most complaints related to joint pain and inflammation        The following portions of the patient's history were reviewed and updated as appropriate:   She  has a past medical history of Arthritis; Bursitis; Hypertension; Post-menopausal; and RLL pneumonia ([facility] [address] )  She   Patient Active Problem List    Diagnosis Date Noted    Toenail fungus 12/19/2018    Bunion of great toe of left foot 12/19/2018    Trochanteric bursitis of left hip 09/25/2018    Actinic keratoses 09/25/2018    Screen for colon cancer 09/25/2018    Erythema migrans (Lyme disease) 08/14/2018    Primary insomnia 06/21/2018    Familial hypercholesterolemia 05/22/2018    Foot pain, bilateral 05/22/2018    Hypertension 05/22/2018    Awakens from sleep at night 11/21/2017    Skin rash 11/30/2016    Subclinical hypothyroidism 11/18/2015    Greater trochanteric bursitis of right hip 06/04/2015    Osteoarthrosis 11/11/2013    Allergic rhinitis 11/11/2013    Other dyschromia 11/11/2013    Disease of sebaceous glands 11/11/2013    Trigger finger 11/11/2013     She  has a past surgical history that includes Bladder suspension and Eye surgery  Her family history includes Colon cancer in her family and mother; Coronary artery disease in her family; Diabetes in her father and mother; Diabetes type II in her family; Heart disease in her father and mother    She reports that she has never smoked  She has never used smokeless tobacco  She reports that she drinks alcohol  She reports that she does not use drugs  Current Outpatient Prescriptions   Medication Sig Dispense Refill    Calcium Carbonate-Vitamin D 600-200 MG-UNIT CAPS Take 1 capsule by mouth daily      cholecalciferol (VITAMIN D3) 1,000 units tablet Take 1,000 Units by mouth daily      cyanocobalamin (VITAMIN B-12) 500 mcg tablet Take 500 mcg by mouth daily      diclofenac sodium (VOLTAREN) 1 % Apply 2 g topically 4 (four) times a day 3 Tube 1    diclofenac sodium (VOLTAREN) 50 mg EC tablet Take 1 tablet (50 mg total) by mouth 2 (two) times a day Take with food 180 tablet 0    loratadine (CLARITIN) 10 mg tablet Take 1 tablet by mouth daily as needed      Melatonin 3 MG CAPS Take 1 mg by mouth daily at bedtime      Misc Natural Products (GLUCOSAMINE CHOND MSM FORMULA PO) Take 1 tablet by mouth daily      Omega-3 Fatty Acids (FISH OIL) 1200 MG CAPS Take 1 capsule by mouth daily      valsartan-hydrochlorothiazide (DIOVAN-HCT) 320-12 5 MG per tablet Take 1 tablet by mouth daily 90 tablet 3    vitamin E, tocopherol, 400 units capsule Take 1 capsule by mouth daily      zolpidem (AMBIEN) 10 mg tablet 1 po qhs prn insomnia (Patient taking differently: Take 5 mg by mouth daily at bedtime as needed 1 po qhs prn insomnia ) 90 tablet 0    terbinafine (LamISIL) 250 mg tablet Take 1 tablet (250 mg total) by mouth daily for 56 days 56 tablet 0     No current facility-administered medications for this visit        Current Outpatient Prescriptions on File Prior to Visit   Medication Sig    Calcium Carbonate-Vitamin D 600-200 MG-UNIT CAPS Take 1 capsule by mouth daily    cholecalciferol (VITAMIN D3) 1,000 units tablet Take 1,000 Units by mouth daily    cyanocobalamin (VITAMIN B-12) 500 mcg tablet Take 500 mcg by mouth daily    diclofenac sodium (VOLTAREN) 1 % Apply 2 g topically 4 (four) times a day    diclofenac sodium (VOLTAREN) 50 mg EC tablet Take 1 tablet (50 mg total) by mouth 2 (two) times a day Take with food    loratadine (CLARITIN) 10 mg tablet Take 1 tablet by mouth daily as needed    Melatonin 3 MG CAPS Take 1 mg by mouth daily at bedtime    Misc Natural Products (GLUCOSAMINE CHOND MSM FORMULA PO) Take 1 tablet by mouth daily    Omega-3 Fatty Acids (FISH OIL) 1200 MG CAPS Take 1 capsule by mouth daily    valsartan-hydrochlorothiazide (DIOVAN-HCT) 320-12 5 MG per tablet Take 1 tablet by mouth daily    vitamin E, tocopherol, 400 units capsule Take 1 capsule by mouth daily    zolpidem (AMBIEN) 10 mg tablet 1 po qhs prn insomnia (Patient taking differently: Take 5 mg by mouth daily at bedtime as needed 1 po qhs prn insomnia )    [DISCONTINUED] fluticasone (FLONASE) 50 mcg/act nasal spray 2 sprays into each nostril daily     No current facility-administered medications on file prior to visit  She has No Known Allergies       Review of Systems   Constitutional: Negative for fatigue  HENT: Negative for congestion, postnasal drip, rhinorrhea, sinus pain, sore throat and trouble swallowing  Eyes: Negative for pain and visual disturbance  Respiratory: Negative for cough, shortness of breath and wheezing  Cardiovascular: Negative for chest pain and palpitations  Gastrointestinal: Negative for constipation, diarrhea, nausea and vomiting  Endocrine: Negative for polydipsia, polyphagia and polyuria  Genitourinary: Negative for difficulty urinating, flank pain, frequency and pelvic pain  Musculoskeletal: Positive for arthralgias  Negative for back pain, joint swelling and myalgias  Bilateral hip pain  Left foot pain   Skin: Negative for color change and rash  Neurological: Negative for dizziness, syncope, weakness, light-headedness, numbness and headaches  Hematological: Negative for adenopathy  Does not bruise/bleed easily     Psychiatric/Behavioral: Negative for behavioral problems and sleep disturbance  The patient is not nervous/anxious  Objective:      /84 (BP Location: Left arm, Patient Position: Sitting, Cuff Size: Large)   Pulse 80   Temp 98 6 °F (37 °C) (Tympanic)   Resp 16   Ht 5' 5" (1 651 m)   Wt 75 8 kg (167 lb)   BMI 27 79 kg/m²          Physical Exam   Constitutional: She is oriented to person, place, and time  She appears well-developed and well-nourished  HENT:   Head: Normocephalic  Eyes: Pupils are equal, round, and reactive to light  Neck: Normal range of motion  Cardiovascular: Normal rate and regular rhythm  Pulmonary/Chest: Effort normal and breath sounds normal    Abdominal: Soft  Bowel sounds are normal    Musculoskeletal:        Right hip: She exhibits tenderness  Left hip: She exhibits tenderness  Right foot: There is tenderness, bony tenderness and deformity  Left foot: There is decreased range of motion and tenderness  Feet:    Toenail thick all 4 left toenails, blackened thick nailbed    Bunion noted left great lateral aspect of toe    Pt c/o bilateral hip pain with rest and ROM   Neurological: She is alert and oriented to person, place, and time  Skin: Skin is warm and dry  Psychiatric: She has a normal mood and affect  Her behavior is normal  Judgment and thought content normal    Nursing note and vitals reviewed

## 2019-01-02 ENCOUNTER — OFFICE VISIT (OUTPATIENT)
Dept: FAMILY MEDICINE CLINIC | Facility: CLINIC | Age: 69
End: 2019-01-02
Payer: MEDICARE

## 2019-01-02 VITALS
WEIGHT: 168.6 LBS | BODY MASS INDEX: 28.09 KG/M2 | DIASTOLIC BLOOD PRESSURE: 92 MMHG | SYSTOLIC BLOOD PRESSURE: 150 MMHG | TEMPERATURE: 98.6 F | HEART RATE: 85 BPM | HEIGHT: 65 IN | OXYGEN SATURATION: 97 %

## 2019-01-02 DIAGNOSIS — M70.61 GREATER TROCHANTERIC BURSITIS OF RIGHT HIP: Primary | ICD-10-CM

## 2019-01-02 DIAGNOSIS — M70.62 TROCHANTERIC BURSITIS OF LEFT HIP: ICD-10-CM

## 2019-01-02 DIAGNOSIS — B35.1 TOENAIL FUNGUS: ICD-10-CM

## 2019-01-02 PROCEDURE — 20610 DRAIN/INJ JOINT/BURSA W/O US: CPT | Performed by: NURSE PRACTITIONER

## 2019-01-02 PROCEDURE — 99214 OFFICE O/P EST MOD 30 MIN: CPT | Performed by: NURSE PRACTITIONER

## 2019-01-02 PROCEDURE — 96372 THER/PROPH/DIAG INJ SC/IM: CPT | Performed by: NURSE PRACTITIONER

## 2019-01-02 RX ORDER — LIDOCAINE HYDROCHLORIDE 10 MG/ML
2 INJECTION, SOLUTION INFILTRATION; PERINEURAL
Status: COMPLETED | OUTPATIENT
Start: 2019-01-02 | End: 2019-01-02

## 2019-01-02 RX ORDER — METHYLPREDNISOLONE ACETATE 40 MG/ML
1 INJECTION, SUSPENSION INTRA-ARTICULAR; INTRALESIONAL; INTRAMUSCULAR; SOFT TISSUE
Status: COMPLETED | OUTPATIENT
Start: 2019-01-02 | End: 2019-01-02

## 2019-01-02 RX ADMIN — METHYLPREDNISOLONE ACETATE 1 ML: 40 INJECTION, SUSPENSION INTRA-ARTICULAR; INTRALESIONAL; INTRAMUSCULAR; SOFT TISSUE at 16:26

## 2019-01-02 RX ADMIN — LIDOCAINE HYDROCHLORIDE 2 ML: 10 INJECTION, SOLUTION INFILTRATION; PERINEURAL at 16:26

## 2019-01-02 NOTE — PROGRESS NOTES
Assessment/Plan:    No problem-specific Assessment & Plan notes found for this encounter  Diagnoses and all orders for this visit:    Greater trochanteric bursitis of right hip  Comments:  40 mg Depomedrol inj right hip    Trochanteric bursitis of left hip  Comments:  40 mg Depomedrol inj right hip    Toenail fungus  Comments:  Pt reports improvement with lamisil    Other orders  -     Large joint arthrocentesis          Subjective:      Patient ID: Jair Hassan is a 77 y o  female  77 yr old female presents for bilateral hip pain  Pt has long standing hx of osteoarthritis with bursitis in both hips for which she receives a intraarticular steroid injection  Pt reports pain with rest and ROM  that interferes with sleep  Pt reports no NV complaints of bilateral extremities        The following portions of the patient's history were reviewed and updated as appropriate:   She  has a past medical history of Arthritis; Bursitis; Hypertension; Post-menopausal; and RLL pneumonia ([facility] [address] )  She   Patient Active Problem List    Diagnosis Date Noted    Toenail fungus 12/19/2018    Bunion of great toe of left foot 12/19/2018    Trochanteric bursitis of left hip 09/25/2018    Actinic keratoses 09/25/2018    Screen for colon cancer 09/25/2018    Erythema migrans (Lyme disease) 08/14/2018    Primary insomnia 06/21/2018    Familial hypercholesterolemia 05/22/2018    Foot pain, bilateral 05/22/2018    Hypertension 05/22/2018    Awakens from sleep at night 11/21/2017    Skin rash 11/30/2016    Subclinical hypothyroidism 11/18/2015    Greater trochanteric bursitis of right hip 06/04/2015    Osteoarthrosis 11/11/2013    Allergic rhinitis 11/11/2013    Other dyschromia 11/11/2013    Disease of sebaceous glands 11/11/2013    Trigger finger 11/11/2013     She  has a past surgical history that includes Bladder suspension and Eye surgery    Her family history includes Colon cancer in her family and mother; Coronary artery disease in her family; Diabetes in her father and mother; Diabetes type II in her family; Heart disease in her father and mother  She  reports that she has never smoked  She has never used smokeless tobacco  She reports that she drinks alcohol  She reports that she does not use drugs  Current Outpatient Prescriptions   Medication Sig Dispense Refill    Calcium Carbonate-Vitamin D 600-200 MG-UNIT CAPS Take 1 capsule by mouth daily      cholecalciferol (VITAMIN D3) 1,000 units tablet Take 1,000 Units by mouth daily      cyanocobalamin (VITAMIN B-12) 500 mcg tablet Take 500 mcg by mouth daily      diclofenac sodium (VOLTAREN) 1 % Apply 2 g topically 4 (four) times a day 3 Tube 1    loratadine (CLARITIN) 10 mg tablet Take 1 tablet by mouth daily as needed      Melatonin 3 MG CAPS Take 1 mg by mouth daily at bedtime      Misc Natural Products (GLUCOSAMINE CHOND MSM FORMULA PO) Take 1 tablet by mouth daily      Omega-3 Fatty Acids (FISH OIL) 1200 MG CAPS Take 1 capsule by mouth daily      terbinafine (LamISIL) 250 mg tablet Take 1 tablet (250 mg total) by mouth daily for 56 days 56 tablet 0    valsartan-hydrochlorothiazide (DIOVAN-HCT) 320-12 5 MG per tablet Take 1 tablet by mouth daily 90 tablet 3    vitamin E, tocopherol, 400 units capsule Take 1 capsule by mouth daily      zolpidem (AMBIEN) 10 mg tablet 1 po qhs prn insomnia 90 tablet 0    diclofenac sodium (VOLTAREN) 50 mg EC tablet Take 1 tablet (50 mg total) by mouth 2 (two) times a day Take with food (Patient not taking: Reported on 1/2/2019 ) 180 tablet 0     No current facility-administered medications for this visit        Current Outpatient Prescriptions on File Prior to Visit   Medication Sig    Calcium Carbonate-Vitamin D 600-200 MG-UNIT CAPS Take 1 capsule by mouth daily    cholecalciferol (VITAMIN D3) 1,000 units tablet Take 1,000 Units by mouth daily    cyanocobalamin (VITAMIN B-12) 500 mcg tablet Take 500 mcg by mouth daily    diclofenac sodium (VOLTAREN) 1 % Apply 2 g topically 4 (four) times a day    loratadine (CLARITIN) 10 mg tablet Take 1 tablet by mouth daily as needed    Melatonin 3 MG CAPS Take 1 mg by mouth daily at bedtime    Misc Natural Products (GLUCOSAMINE CHOND MSM FORMULA PO) Take 1 tablet by mouth daily    Omega-3 Fatty Acids (FISH OIL) 1200 MG CAPS Take 1 capsule by mouth daily    terbinafine (LamISIL) 250 mg tablet Take 1 tablet (250 mg total) by mouth daily for 56 days    valsartan-hydrochlorothiazide (DIOVAN-HCT) 320-12 5 MG per tablet Take 1 tablet by mouth daily    vitamin E, tocopherol, 400 units capsule Take 1 capsule by mouth daily    zolpidem (AMBIEN) 10 mg tablet 1 po qhs prn insomnia    diclofenac sodium (VOLTAREN) 50 mg EC tablet Take 1 tablet (50 mg total) by mouth 2 (two) times a day Take with food (Patient not taking: Reported on 1/2/2019 )     No current facility-administered medications on file prior to visit  She has No Known Allergies       Review of Systems   Constitutional: Negative for fatigue  HENT: Negative for congestion, postnasal drip, rhinorrhea, sinus pain, sore throat and trouble swallowing  Eyes: Negative for pain and visual disturbance  Respiratory: Negative for cough, shortness of breath and wheezing  Cardiovascular: Negative for chest pain and palpitations  Gastrointestinal: Negative for constipation, diarrhea, nausea and vomiting  Endocrine: Negative for polydipsia, polyphagia and polyuria  Genitourinary: Negative for difficulty urinating, flank pain, frequency and pelvic pain  Musculoskeletal: Positive for arthralgias  Negative for back pain, joint swelling and myalgias  Both hips hurt in the joint     Skin: Negative  Negative for color change and rash  Neurological: Negative for dizziness, syncope, weakness, light-headedness, numbness and headaches  Hematological: Negative for adenopathy  Does not bruise/bleed easily  Psychiatric/Behavioral: Negative for behavioral problems and sleep disturbance  The patient is not nervous/anxious  Objective:      /92   Pulse 85   Temp 98 6 °F (37 °C) (Tympanic)   Ht 5' 5" (1 651 m)   Wt 76 5 kg (168 lb 9 6 oz)   SpO2 97%   BMI 28 06 kg/m²          Physical Exam   Constitutional: She is oriented to person, place, and time  She appears well-developed and well-nourished  HENT:   Head: Normocephalic  Eyes: Pupils are equal, round, and reactive to light  Neck: Normal range of motion  Cardiovascular: Normal rate and regular rhythm  Pulmonary/Chest: Effort normal and breath sounds normal    Abdominal: Soft  Bowel sounds are normal    Musculoskeletal:        Right hip: She exhibits decreased range of motion and tenderness  Left hip: She exhibits decreased range of motion and tenderness  Neurological: She is alert and oriented to person, place, and time  Skin: Skin is warm and dry  Psychiatric: She has a normal mood and affect  Her behavior is normal  Judgment and thought content normal    Nursing note and vitals reviewed          Large joint arthrocentesis  Date/Time: 1/2/2019 4:26 PM  Consent given by: patient  Timeout: Immediately prior to procedure a time out was called to verify the correct patient, procedure, equipment, support staff and site/side marked as required   Supporting Documentation  Indications: pain   Procedure Details  Location: hip - R greater trochanteric bursa  Preparation: Patient was prepped and draped in the usual sterile fashion  Needle size: 20 G  Ultrasound guidance: no  Approach: anterolateral (anterolateral hip injection bilaterally)  Medications administered: 2 mL lidocaine 1 %; 1 mL methylPREDNISolone acetate 40 mg/mL    Patient tolerance: patient tolerated the procedure well with no immediate complications

## 2019-01-16 DIAGNOSIS — M79.671 FOOT PAIN, BILATERAL: ICD-10-CM

## 2019-01-16 DIAGNOSIS — M79.672 FOOT PAIN, BILATERAL: ICD-10-CM

## 2019-01-16 DIAGNOSIS — F51.01 PRIMARY INSOMNIA: ICD-10-CM

## 2019-01-16 DIAGNOSIS — I10 ESSENTIAL HYPERTENSION: ICD-10-CM

## 2019-01-16 NOTE — TELEPHONE ENCOUNTER
I I have been changing some people and others have opted to stay on it    What does the pt wish to do    The recall is of certain batch # and from certain manufacturers per the article I read

## 2019-01-17 RX ORDER — ZOLPIDEM TARTRATE 10 MG/1
TABLET ORAL
Qty: 90 TABLET | Refills: 0 | Status: SHIPPED | OUTPATIENT
Start: 2019-01-17 | End: 2019-07-08 | Stop reason: SDUPTHER

## 2019-01-17 RX ORDER — VALSARTAN AND HYDROCHLOROTHIAZIDE 320; 12.5 MG/1; MG/1
1 TABLET, FILM COATED ORAL DAILY
Qty: 90 TABLET | Refills: 0 | Status: SHIPPED | OUTPATIENT
Start: 2019-01-17 | End: 2019-03-06 | Stop reason: CLARIF

## 2019-02-11 ENCOUNTER — TRANSCRIBE ORDERS (OUTPATIENT)
Dept: LAB | Facility: CLINIC | Age: 69
End: 2019-02-11

## 2019-02-11 ENCOUNTER — APPOINTMENT (OUTPATIENT)
Dept: LAB | Facility: CLINIC | Age: 69
End: 2019-02-11
Payer: MEDICARE

## 2019-02-11 ENCOUNTER — TELEPHONE (OUTPATIENT)
Dept: FAMILY MEDICINE CLINIC | Facility: CLINIC | Age: 69
End: 2019-02-11

## 2019-02-11 DIAGNOSIS — E03.8 SUBCLINICAL HYPOTHYROIDISM: ICD-10-CM

## 2019-02-11 DIAGNOSIS — B35.1 FUNGAL TOENAIL INFECTION: ICD-10-CM

## 2019-02-11 DIAGNOSIS — I10 ESSENTIAL HYPERTENSION: ICD-10-CM

## 2019-02-11 DIAGNOSIS — Z13.220 SCREENING FOR HYPERLIPIDEMIA: ICD-10-CM

## 2019-02-11 DIAGNOSIS — A69.20 LYME DISEASE: ICD-10-CM

## 2019-02-11 LAB
ALBUMIN SERPL BCP-MCNC: 4 G/DL (ref 3.5–5)
ALP SERPL-CCNC: 66 U/L (ref 46–116)
ALT SERPL W P-5'-P-CCNC: 34 U/L (ref 12–78)
ANION GAP SERPL CALCULATED.3IONS-SCNC: 10 MMOL/L (ref 4–13)
AST SERPL W P-5'-P-CCNC: 26 U/L (ref 5–45)
BASOPHILS # BLD AUTO: 0.03 THOUSANDS/ΜL (ref 0–0.1)
BASOPHILS NFR BLD AUTO: 0 % (ref 0–1)
BILIRUB SERPL-MCNC: 0.39 MG/DL (ref 0.2–1)
BILIRUB UR QL STRIP: NEGATIVE
BUN SERPL-MCNC: 24 MG/DL (ref 5–25)
CALCIUM SERPL-MCNC: 10.1 MG/DL (ref 8.3–10.1)
CHLORIDE SERPL-SCNC: 103 MMOL/L (ref 100–108)
CHOLEST SERPL-MCNC: 251 MG/DL (ref 50–200)
CLARITY UR: CLEAR
CO2 SERPL-SCNC: 26 MMOL/L (ref 21–32)
COLOR UR: YELLOW
CREAT SERPL-MCNC: 1.2 MG/DL (ref 0.6–1.3)
EOSINOPHIL # BLD AUTO: 0.18 THOUSAND/ΜL (ref 0–0.61)
EOSINOPHIL NFR BLD AUTO: 3 % (ref 0–6)
ERYTHROCYTE [DISTWIDTH] IN BLOOD BY AUTOMATED COUNT: 13.6 % (ref 11.6–15.1)
GFR SERPL CREATININE-BSD FRML MDRD: 46 ML/MIN/1.73SQ M
GLUCOSE P FAST SERPL-MCNC: 99 MG/DL (ref 65–99)
GLUCOSE UR STRIP-MCNC: NEGATIVE MG/DL
HCT VFR BLD AUTO: 41 % (ref 34.8–46.1)
HDLC SERPL-MCNC: 57 MG/DL (ref 40–60)
HGB BLD-MCNC: 13.1 G/DL (ref 11.5–15.4)
HGB UR QL STRIP.AUTO: NEGATIVE
IMM GRANULOCYTES # BLD AUTO: 0.02 THOUSAND/UL (ref 0–0.2)
IMM GRANULOCYTES NFR BLD AUTO: 0 % (ref 0–2)
KETONES UR STRIP-MCNC: NEGATIVE MG/DL
LDLC SERPL CALC-MCNC: 162 MG/DL (ref 0–100)
LEUKOCYTE ESTERASE UR QL STRIP: NEGATIVE
LYMPHOCYTES # BLD AUTO: 2.72 THOUSANDS/ΜL (ref 0.6–4.47)
LYMPHOCYTES NFR BLD AUTO: 40 % (ref 14–44)
MCH RBC QN AUTO: 31.6 PG (ref 26.8–34.3)
MCHC RBC AUTO-ENTMCNC: 32 G/DL (ref 31.4–37.4)
MCV RBC AUTO: 99 FL (ref 82–98)
MONOCYTES # BLD AUTO: 0.68 THOUSAND/ΜL (ref 0.17–1.22)
MONOCYTES NFR BLD AUTO: 10 % (ref 4–12)
NEUTROPHILS # BLD AUTO: 3.17 THOUSANDS/ΜL (ref 1.85–7.62)
NEUTS SEG NFR BLD AUTO: 47 % (ref 43–75)
NITRITE UR QL STRIP: NEGATIVE
NONHDLC SERPL-MCNC: 194 MG/DL
NRBC BLD AUTO-RTO: 0 /100 WBCS
PH UR STRIP.AUTO: 6 [PH] (ref 4.5–8)
PLATELET # BLD AUTO: 318 THOUSANDS/UL (ref 149–390)
PMV BLD AUTO: 10.5 FL (ref 8.9–12.7)
POTASSIUM SERPL-SCNC: 3.5 MMOL/L (ref 3.5–5.3)
PROT SERPL-MCNC: 7.8 G/DL (ref 6.4–8.2)
PROT UR STRIP-MCNC: NEGATIVE MG/DL
RBC # BLD AUTO: 4.14 MILLION/UL (ref 3.81–5.12)
SODIUM SERPL-SCNC: 139 MMOL/L (ref 136–145)
SP GR UR STRIP.AUTO: 1.02 (ref 1–1.03)
TRIGL SERPL-MCNC: 160 MG/DL
TSH SERPL DL<=0.05 MIU/L-ACNC: 3.4 UIU/ML (ref 0.36–3.74)
UROBILINOGEN UR QL STRIP.AUTO: 0.2 E.U./DL
WBC # BLD AUTO: 6.8 THOUSAND/UL (ref 4.31–10.16)

## 2019-02-11 PROCEDURE — 80061 LIPID PANEL: CPT

## 2019-02-11 PROCEDURE — 84443 ASSAY THYROID STIM HORMONE: CPT

## 2019-02-11 PROCEDURE — 36415 COLL VENOUS BLD VENIPUNCTURE: CPT

## 2019-02-11 PROCEDURE — 86618 LYME DISEASE ANTIBODY: CPT

## 2019-02-11 PROCEDURE — 86617 LYME DISEASE ANTIBODY: CPT

## 2019-02-11 PROCEDURE — 80053 COMPREHEN METABOLIC PANEL: CPT | Performed by: NURSE PRACTITIONER

## 2019-02-11 PROCEDURE — 81003 URINALYSIS AUTO W/O SCOPE: CPT | Performed by: NURSE PRACTITIONER

## 2019-02-11 PROCEDURE — 85025 COMPLETE CBC W/AUTO DIFF WBC: CPT

## 2019-02-11 RX ORDER — TERBINAFINE HYDROCHLORIDE 250 MG/1
250 TABLET ORAL DAILY
Qty: 28 TABLET | Refills: 0 | Status: SHIPPED | OUTPATIENT
Start: 2019-02-11 | End: 2019-03-11

## 2019-02-11 NOTE — TELEPHONE ENCOUNTER
The rx this patient is requesting is not for long term use - its only to be used for 60 days I will run by provider

## 2019-02-11 NOTE — TELEPHONE ENCOUNTER
Needs refill on her terbinafine 250 mg  #56  takes 1QD daily    Will need this to go to mail order      Pharmacy [facility]    But since she is low in pill can she have a script sent to [facility] also    Please advise    Phone:[phone]

## 2019-02-11 NOTE — TELEPHONE ENCOUNTER
Actually you can use it for 12 weeks for fungi toenail  Shellie Carrasco send a refill thru March 2019

## 2019-02-12 LAB
B BURGDOR IGG SER IA-ACNC: 0.93
B BURGDOR IGM SER IA-ACNC: 2.2

## 2019-02-13 PROBLEM — A69.20 LYME DISEASE: Status: ACTIVE | Noted: 2019-02-13

## 2019-02-13 LAB

## 2019-02-27 ENCOUNTER — HOSPITAL ENCOUNTER (OUTPATIENT)
Dept: RADIOLOGY | Age: 69
Discharge: HOME | End: 2019-02-27
Payer: MEDICARE

## 2019-02-27 VITALS — BODY MASS INDEX: 27.49 KG/M2 | WEIGHT: 165 LBS | HEIGHT: 65 IN

## 2019-02-27 DIAGNOSIS — Z12.39 SCREENING FOR BREAST CANCER: ICD-10-CM

## 2019-02-27 DIAGNOSIS — Z78.0 POST-MENOPAUSAL: ICD-10-CM

## 2019-02-27 PROCEDURE — 77067 SCR MAMMO BI INCL CAD: CPT

## 2019-02-27 PROCEDURE — 77080 DXA BONE DENSITY AXIAL: CPT

## 2019-02-27 PROCEDURE — 77063 BREAST TOMOSYNTHESIS BI: CPT

## 2019-03-06 ENCOUNTER — OFFICE VISIT (OUTPATIENT)
Dept: FAMILY MEDICINE CLINIC | Facility: CLINIC | Age: 69
End: 2019-03-06
Payer: MEDICARE

## 2019-03-06 VITALS
SYSTOLIC BLOOD PRESSURE: 156 MMHG | HEART RATE: 83 BPM | TEMPERATURE: 98.3 F | OXYGEN SATURATION: 89 % | WEIGHT: 167 LBS | DIASTOLIC BLOOD PRESSURE: 94 MMHG | BODY MASS INDEX: 27.82 KG/M2 | HEIGHT: 65 IN

## 2019-03-06 DIAGNOSIS — Z00.00 MEDICARE ANNUAL WELLNESS VISIT, SUBSEQUENT: Primary | ICD-10-CM

## 2019-03-06 DIAGNOSIS — E78.01 FAMILIAL HYPERCHOLESTEROLEMIA: ICD-10-CM

## 2019-03-06 DIAGNOSIS — I10 ESSENTIAL HYPERTENSION: ICD-10-CM

## 2019-03-06 PROCEDURE — G0439 PPPS, SUBSEQ VISIT: HCPCS | Performed by: NURSE PRACTITIONER

## 2019-03-06 PROCEDURE — 99214 OFFICE O/P EST MOD 30 MIN: CPT | Performed by: NURSE PRACTITIONER

## 2019-03-06 RX ORDER — EPROSARTAN MESYLATE 600 MG/1
600 TABLET, FILM COATED ORAL DAILY
Qty: 30 TABLET | Refills: 1 | Status: SHIPPED | OUTPATIENT
Start: 2019-03-06 | End: 2019-04-17 | Stop reason: ALTCHOICE

## 2019-03-06 NOTE — PROGRESS NOTES
Assessment and Plan:    Diagnoses and all orders for this visit:    Medicare annual wellness visit, subsequent  Comments:  Completed today    Essential hypertension  Comments:  renal failure noted on labwork will dc Losartan /HCTZ  to Losartan substistitute and recheck in 4 weeks  Orders:  -     eprosartan (TEVETEN) 600 MG tablet; Take 1 tablet (600 mg total) by mouth daily    Familial hypercholesterolemia  Comments:  pt cholesterol and LDL elevated pt has multijoint pain and afraid to start Statin, will try Tumeric         Problem List Items Addressed This Visit        Cardiovascular and Mediastinum    Hypertension    Relevant Medications    eprosartan (TEVETEN) 600 MG tablet       Other    Familial hypercholesterolemia      Other Visit Diagnoses     Medicare annual wellness visit, subsequent    -  Primary    Completed today        Health Maintenance Due   Topic Date Due    Medicare Annual Wellness Visit (AWV)  1950    BMI: Followup Plan  08/30/1966    CRC Screening: Colonoscopy  08/22/2018         HPI:  Sven Degroot is a 77 y o  female here for her Subsequent Wellness Visit      Patient Active Problem List   Diagnosis    Osteoarthrosis    Greater trochanteric bursitis of right hip    Familial hypercholesterolemia    Foot pain, bilateral    Hypertension    Primary insomnia    Erythema migrans (Lyme disease)    Trochanteric bursitis of left hip    Actinic keratoses    Screen for colon cancer    Allergic rhinitis    Awakens from sleep at night    Other dyschromia    Disease of sebaceous glands    Skin rash    Subclinical hypothyroidism    Trigger finger    Toenail fungus    Bunion of great toe of left foot    Lyme disease     Past Medical History:   Diagnosis Date    Arthritis     Bursitis     bilateral hips     Hypertension     Post-menopausal     RLL pneumonia ([address] )     Last Assessed:11/4/14     Past Surgical History:   Procedure Laterality Date    BLADDER SUSPENSION      EYE SURGERY      Retinal Detachment complex     Family History   Problem Relation Age of Onset    Heart disease Mother         Coronary disease    Colon cancer Mother 67    Diabetes Mother     Heart disease Father         Coronary disease    Diabetes Father     Colon cancer Family     Coronary artery disease Family     Diabetes type II Family      Social History     Tobacco Use   Smoking Status Never Smoker   Smokeless Tobacco Never Used     Social History     Substance and Sexual Activity   Alcohol Use Yes    Comment: socially      Social History     Substance and Sexual Activity   Drug Use No       Current Outpatient Medications   Medication Sig Dispense Refill    Calcium Carbonate-Vitamin D 600-200 MG-UNIT CAPS Take 1 capsule by mouth daily      cholecalciferol (VITAMIN D3) 1,000 units tablet Take 1,000 Units by mouth daily      cyanocobalamin (VITAMIN B-12) 500 mcg tablet Take 500 mcg by mouth daily      diclofenac sodium (VOLTAREN) 1 % Apply 2 g topically 4 (four) times a day 3 Tube 1    loratadine (CLARITIN) 10 mg tablet Take 1 tablet by mouth daily as needed      Melatonin 3 MG CAPS Take 1 mg by mouth daily at bedtime      Omega-3 Fatty Acids (FISH OIL) 1200 MG CAPS Take 1 capsule by mouth daily      terbinafine (LamISIL) 250 mg tablet Take 1 tablet (250 mg total) by mouth daily for 28 days 28 tablet 0    vitamin E, tocopherol, 400 units capsule Take 1 capsule by mouth daily      zolpidem (AMBIEN) 10 mg tablet 1 po qhs prn insomnia 90 tablet 0    diclofenac sodium (VOLTAREN) 50 mg EC tablet Take 1 tablet (50 mg total) by mouth 2 (two) times a day Take with food (Patient not taking: Reported on 3/6/2019) 180 tablet 0    eprosartan (TEVETEN) 600 MG tablet Take 1 tablet (600 mg total) by mouth daily 30 tablet 1    Misc Natural Products (GLUCOSAMINE CHOND MSM FORMULA PO) Take 1 tablet by mouth daily       No current facility-administered medications for this visit        No Known Allergies  Immunization History   Administered Date(s) Administered    INFLUENZA 11/09/2015, 11/16/2016, 10/10/2017    Influenza Split High Dose Preservative Free IM 10/21/2013, 01/08/2015, 11/09/2015, 11/16/2016, 10/10/2017    Influenza TIV (IM) 09/26/2012    Influenza, high dose seasonal 0 5 mL 09/25/2018    Pneumococcal Conjugate 13-Valent 01/08/2015    Pneumococcal Polysaccharide PPV23 11/14/2013    Tdap 11/09/2015       Patient Care Team:  Sheron Newsome as PCP - General (Family Medicine)  Diamante Dozier MD    Medicare Screening Tests and Risk Assessments:  Christy Stevens is here for her Initial Wellness visit  Health Risk Assessment:  Patient rates overall health as good  Patient feels that their physical health rating is Slightly better  Eyesight was rated as Slightly worse  Hearing was rated as Same  Patient feels that their emotional and mental health rating is Same  Pain experienced by patient in the last 7 days has been Some  Patient's pain rating has been 3/10  Patient states that she has experienced no weight loss or gain in last 6 months  Emotional/Mental Health:  Patient has been feeling nervous/anxious  PHQ-9 Depression Screening:    Frequency of the following problems over the past two weeks:      1  Little interest or pleasure in doing things: 0 - not at all      2  Feeling down, depressed, or hopeless: 0 - not at all  PHQ-2 Score: 0          Broken Bones/Falls: Fall Risk Assessment:    In the past year, patient has experienced: No history of falling in past year          Bladder/Bowel:  Patient has not leaked urine accidently in the last six months  Patient reports no loss of bowel control  Immunizations:  Patient has had a flu vaccination within the last year  Patient has received a pneumonia shot  Patient has not received a shingles shot  Patient has not received tetanus/diphtheria shot       Home Safety:  Patient does not have trouble with stairs inside or outside of their home  Patient currently reports that there are no safety hazards present in home, working smoke alarms, working carbon monoxide detectors  Preventative Screenings:   Breast cancer screening performed, colon cancer screen completed, cholesterol screen completed, no glaucoma eye exam completed    Nutrition:  Current diet: Regular and Limited junk food with servings of the following:    Medications:  Patient is currently taking over-the-counter supplements  Patient is able to manage medications  Lifestyle Choices:  Patient reports no tobacco use  Patient has not smoked or used tobacco in the past   Patient reports alcohol use  Alcohol use per week: 3  Patient drives a vehicle  Patient wears seat belt  Current level of exercise of physical activity described by patient as: starting exercise program tomorrow  Activities of Daily Living:  Can get out of bed by his or her self, able to dress self, able to make own meals, able to do own shopping, able to bathe self, can do own laundry/housekeeping, can manage own money, pay bills and track expenses    Previous Hospitalizations:  No hospitalization or ED visit in past 12 months        Advanced Directives:  Patient has decided on a power of   Patient has spoken to designated power of   Patient has completed advanced directive          Preventative Screening/Counseling:      Cardiovascular:      General: Screening Current          Diabetes:      General: Screening Current          Colorectal Cancer:      General: Screening Current          Breast Cancer:      General: Screening Current          Cervical Cancer:      General: Screening Current          Osteoporosis:      General: Screening Current          AAA:      General: Screening Not Indicated          Glaucoma:      General: Screening Current          HIV:      General: Screening Not Indicated          Hepatitis C:      General: Screening Not Indicated        Advanced Directives:   Patient has living will for healthcare, has durable POA for healthcare, patient does not have an advanced directive  Information on ACP and/or AD not provided  No 5 wishes given       Immunizations:      Influenza: Influenza UTD This Year      Pneumococcal: Risks & Benefits Discussed      Shingrix: Risks & Benefits Discussed      Hepatitis B (Medium to high risk patients): Patient Declines      Zostavax: Patient Declines      TD: Patient Declines      TDAP: Patient Declines

## 2019-04-01 ENCOUNTER — TELEPHONE (OUTPATIENT)
Dept: FAMILY MEDICINE CLINIC | Facility: CLINIC | Age: 69
End: 2019-04-01

## 2019-04-01 DIAGNOSIS — I10 ESSENTIAL HYPERTENSION: Primary | ICD-10-CM

## 2019-04-01 RX ORDER — AMLODIPINE BESYLATE AND BENAZEPRIL HYDROCHLORIDE 10; 40 MG/1; MG/1
1 CAPSULE ORAL DAILY
Qty: 30 CAPSULE | Refills: 1 | Status: SHIPPED | OUTPATIENT
Start: 2019-04-01 | End: 2019-06-20 | Stop reason: SDUPTHER

## 2019-04-01 RX ORDER — AMLODIPINE BESYLATE AND BENAZEPRIL HYDROCHLORIDE 10; 40 MG/1; MG/1
1 CAPSULE ORAL DAILY
Qty: 30 CAPSULE | Refills: 1 | Status: SHIPPED | OUTPATIENT
Start: 2019-04-01 | End: 2019-04-01 | Stop reason: SDUPTHER

## 2019-04-03 ENCOUNTER — OFFICE VISIT (OUTPATIENT)
Dept: FAMILY MEDICINE CLINIC | Facility: CLINIC | Age: 69
End: 2019-04-03
Payer: MEDICARE

## 2019-04-03 VITALS
HEART RATE: 80 BPM | BODY MASS INDEX: 27.19 KG/M2 | DIASTOLIC BLOOD PRESSURE: 90 MMHG | OXYGEN SATURATION: 99 % | SYSTOLIC BLOOD PRESSURE: 152 MMHG | TEMPERATURE: 98.5 F | WEIGHT: 163.2 LBS | HEIGHT: 65 IN

## 2019-04-03 DIAGNOSIS — I10 ESSENTIAL HYPERTENSION: Primary | ICD-10-CM

## 2019-04-03 DIAGNOSIS — M70.61 GREATER TROCHANTERIC BURSITIS OF RIGHT HIP: ICD-10-CM

## 2019-04-03 PROBLEM — M25.551 RIGHT HIP PAIN: Status: ACTIVE | Noted: 2019-04-03

## 2019-04-03 PROCEDURE — 20610 DRAIN/INJ JOINT/BURSA W/O US: CPT

## 2019-04-03 PROCEDURE — 99214 OFFICE O/P EST MOD 30 MIN: CPT

## 2019-04-03 PROCEDURE — 96372 THER/PROPH/DIAG INJ SC/IM: CPT

## 2019-04-03 RX ORDER — METHYLPREDNISOLONE ACETATE 40 MG/ML
40 INJECTION, SUSPENSION INTRA-ARTICULAR; INTRALESIONAL; INTRAMUSCULAR; SOFT TISSUE ONCE
Status: COMPLETED | OUTPATIENT
Start: 2019-04-03 | End: 2019-04-03

## 2019-04-03 RX ORDER — LIDOCAINE HYDROCHLORIDE 10 MG/ML
2 INJECTION, SOLUTION INFILTRATION; PERINEURAL
Status: COMPLETED | OUTPATIENT
Start: 2019-04-03 | End: 2019-04-03

## 2019-04-03 RX ADMIN — METHYLPREDNISOLONE ACETATE 40 MG: 40 INJECTION, SUSPENSION INTRA-ARTICULAR; INTRALESIONAL; INTRAMUSCULAR; SOFT TISSUE at 14:46

## 2019-04-03 RX ADMIN — LIDOCAINE HYDROCHLORIDE 2 ML: 10 INJECTION, SOLUTION INFILTRATION; PERINEURAL at 13:01

## 2019-04-17 ENCOUNTER — ANNUAL EXAM (OUTPATIENT)
Dept: FAMILY MEDICINE CLINIC | Facility: CLINIC | Age: 69
End: 2019-04-17
Payer: MEDICARE

## 2019-04-17 VITALS
BODY MASS INDEX: 27.22 KG/M2 | HEIGHT: 65 IN | WEIGHT: 163.4 LBS | DIASTOLIC BLOOD PRESSURE: 84 MMHG | HEART RATE: 76 BPM | TEMPERATURE: 97.2 F | SYSTOLIC BLOOD PRESSURE: 134 MMHG | RESPIRATION RATE: 12 BRPM

## 2019-04-17 DIAGNOSIS — M79.672 FOOT PAIN, BILATERAL: ICD-10-CM

## 2019-04-17 DIAGNOSIS — R30.0 DYSURIA: ICD-10-CM

## 2019-04-17 DIAGNOSIS — Z01.419 WOMEN'S ANNUAL ROUTINE GYNECOLOGICAL EXAMINATION: Primary | ICD-10-CM

## 2019-04-17 DIAGNOSIS — M79.671 FOOT PAIN, BILATERAL: ICD-10-CM

## 2019-04-17 LAB
SL AMB  POCT GLUCOSE, UA: NEGATIVE
SL AMB LEUKOCYTE ESTERASE,UA: NEGATIVE
SL AMB POCT BILIRUBIN,UA: NEGATIVE
SL AMB POCT BLOOD,UA: NEGATIVE
SL AMB POCT CLARITY,UA: CLEAR
SL AMB POCT COLOR,UA: YELLOW
SL AMB POCT KETONES,UA: NEGATIVE
SL AMB POCT NITRITE,UA: NEGATIVE
SL AMB POCT PH,UA: 5
SL AMB POCT SPECIFIC GRAVITY,UA: 1.02
SL AMB POCT URINE PROTEIN: NEGATIVE
SL AMB POCT UROBILINOGEN: NORMAL

## 2019-04-17 PROCEDURE — 99214 OFFICE O/P EST MOD 30 MIN: CPT | Performed by: NURSE PRACTITIONER

## 2019-04-17 PROCEDURE — 81002 URINALYSIS NONAUTO W/O SCOPE: CPT | Performed by: NURSE PRACTITIONER

## 2019-04-17 PROCEDURE — G0143 SCR C/V CYTO,THINLAYER,RESCR: HCPCS | Performed by: NURSE PRACTITIONER

## 2019-04-17 RX ORDER — PREDNISONE 20 MG/1
TABLET ORAL
Qty: 16 TABLET | Refills: 0 | Status: SHIPPED | OUTPATIENT
Start: 2019-04-17 | End: 2019-06-20 | Stop reason: ALTCHOICE

## 2019-04-22 DIAGNOSIS — M79.671 FOOT PAIN, BILATERAL: ICD-10-CM

## 2019-04-22 DIAGNOSIS — M79.672 FOOT PAIN, BILATERAL: ICD-10-CM

## 2019-04-23 LAB
LAB AP GYN PRIMARY INTERPRETATION: NORMAL
Lab: NORMAL

## 2019-06-05 DIAGNOSIS — I10 ESSENTIAL HYPERTENSION: ICD-10-CM

## 2019-06-05 RX ORDER — AMLODIPINE BESYLATE AND BENAZEPRIL HYDROCHLORIDE 10; 40 MG/1; MG/1
CAPSULE ORAL
Qty: 30 CAPSULE | Refills: 1 | Status: SHIPPED | OUTPATIENT
Start: 2019-06-05 | End: 2019-07-08 | Stop reason: SDUPTHER

## 2019-06-20 ENCOUNTER — OFFICE VISIT (OUTPATIENT)
Dept: FAMILY MEDICINE CLINIC | Facility: CLINIC | Age: 69
End: 2019-06-20
Payer: MEDICARE

## 2019-06-20 VITALS
HEIGHT: 65 IN | RESPIRATION RATE: 20 BRPM | TEMPERATURE: 99.1 F | BODY MASS INDEX: 27.69 KG/M2 | SYSTOLIC BLOOD PRESSURE: 120 MMHG | HEART RATE: 100 BPM | WEIGHT: 166.2 LBS | DIASTOLIC BLOOD PRESSURE: 80 MMHG

## 2019-06-20 DIAGNOSIS — I83.893 VARICOSE VEINS OF BOTH LEGS WITH EDEMA: Primary | ICD-10-CM

## 2019-06-20 PROCEDURE — 99213 OFFICE O/P EST LOW 20 MIN: CPT | Performed by: NURSE PRACTITIONER

## 2019-07-08 DIAGNOSIS — I10 ESSENTIAL HYPERTENSION: ICD-10-CM

## 2019-07-08 DIAGNOSIS — F51.01 PRIMARY INSOMNIA: ICD-10-CM

## 2019-07-08 RX ORDER — ZOLPIDEM TARTRATE 10 MG/1
TABLET ORAL
Qty: 90 TABLET | Refills: 0 | Status: SHIPPED | OUTPATIENT
Start: 2019-07-08 | End: 2019-07-24 | Stop reason: SDUPTHER

## 2019-07-08 RX ORDER — AMLODIPINE BESYLATE AND BENAZEPRIL HYDROCHLORIDE 10; 40 MG/1; MG/1
1 CAPSULE ORAL DAILY
Qty: 90 CAPSULE | Refills: 1 | Status: SHIPPED | OUTPATIENT
Start: 2019-07-08 | End: 2019-12-20 | Stop reason: SDUPTHER

## 2019-07-24 DIAGNOSIS — F51.01 PRIMARY INSOMNIA: ICD-10-CM

## 2019-07-24 RX ORDER — ZOLPIDEM TARTRATE 10 MG/1
TABLET ORAL
Qty: 90 TABLET | Refills: 0 | Status: SHIPPED | OUTPATIENT
Start: 2019-07-24 | End: 2019-12-20 | Stop reason: SDUPTHER

## 2019-07-24 NOTE — TELEPHONE ENCOUNTER
Prior to prescribing the controlled substance, a patient search was performed on the [place] prescription drug monitoring program web site including all available states  There was no evidence of diversion or misuse  Prescription provided

## 2019-08-12 DIAGNOSIS — M79.671 FOOT PAIN, BILATERAL: ICD-10-CM

## 2019-08-12 DIAGNOSIS — M79.672 FOOT PAIN, BILATERAL: ICD-10-CM

## 2019-10-24 ENCOUNTER — IMMUNIZATIONS (OUTPATIENT)
Dept: FAMILY MEDICINE CLINIC | Facility: CLINIC | Age: 69
End: 2019-10-24
Payer: MEDICARE

## 2019-10-24 DIAGNOSIS — Z23 ENCOUNTER FOR IMMUNIZATION: ICD-10-CM

## 2019-10-24 PROCEDURE — G0008 ADMIN INFLUENZA VIRUS VAC: HCPCS

## 2019-10-24 PROCEDURE — 90662 IIV NO PRSV INCREASED AG IM: CPT

## 2019-12-15 NOTE — PROGRESS NOTES
Assessment/Plan:    Problem List Items Addressed This Visit     Familial hypercholesterolemia - Primary    Relevant Medications    rosuvastatin (CRESTOR) 10 MG tablet    Bunion of great toe of right foot      Other Visit Diagnoses     Screening for deficiency anemia        Relevant Orders    CBC and differential    Screening for diabetes mellitus        Relevant Orders    Comprehensive metabolic panel    Screening for hyperlipidemia        Relevant Orders    Lipid panel    Screening for thyroid disorder        Relevant Orders    TSH, 3rd generation with Free T4 reflex    Vitamin D deficiency        Relevant Orders    Vitamin D 25 hydroxy           Diagnoses and all orders for this visit:    Familial hypercholesterolemia  Comments:  aware fo ASCVD r s  and lipid #s and is agreeable start crestor but would like to wait until 2/2020 results  Will Call Pt with results  Orders:  -     rosuvastatin (CRESTOR) 10 MG tablet; Take 1 tablet (10 mg total) by mouth daily    Screening for deficiency anemia  -     CBC and differential; Future    Screening for diabetes mellitus  -     Comprehensive metabolic panel; Future    Screening for hyperlipidemia  -     Lipid panel; Future    Screening for thyroid disorder  -     TSH, 3rd generation with Free T4 reflex; Future    Vitamin D deficiency  -     Vitamin D 25 hydroxy; Future    Bunion of great toe of left foot  Comments:  Possible Sx with Dr Enrique Fay in April    Bunion of great toe of right foot  Comments:  Possible Sx with Dr roman after April    Other orders  -     TURMERIC PO; Take 1 caplet by mouth daily        No problem-specific Assessment & Plan notes found for this encounter  Subjective:      Patient ID: Russell Szymanski is a 68 y o  female  Russell Szymanski is here for a routine 6 month visit  Pt doing well overall, offers no acute complaints today  Hypertension   This is a chronic problem  The current episode started more than 1 year ago   The problem is controlled  Pertinent negatives include no anxiety, blurred vision, chest pain, headaches, malaise/fatigue, neck pain, orthopnea, palpitations, peripheral edema, PND, shortness of breath or sweats  Risk factors for coronary artery disease include dyslipidemia  Past treatments include calcium channel blockers and ACE inhibitors  The current treatment provides significant improvement  There are no compliance problems  There is no history of angina, kidney disease, CAD/MI, CVA, heart failure, left ventricular hypertrophy, PVD or retinopathy  Identifiable causes of hypertension include a thyroid problem  There is no history of chronic renal disease, a hypertension causing med or sleep apnea  Hyperlipidemia   This is a chronic problem  The current episode started more than 1 year ago  Exacerbating diseases include hypothyroidism and obesity  She has no history of chronic renal disease, diabetes, liver disease or nephrotic syndrome  There are no known factors aggravating her hyperlipidemia  Pertinent negatives include no chest pain, focal sensory loss, focal weakness, leg pain, myalgias or shortness of breath  Current antihyperlipidemic treatment includes diet change  Risk factors for coronary artery disease include dyslipidemia, family history and post-menopausal    Arthritis   Presents for follow-up visit  She complains of pain and stiffness  The symptoms have been stable  Affected locations include the right foot and left foot  Pertinent negatives include no diarrhea, dry eyes, dry mouth, dysuria, fatigue, fever, pain at night, pain while resting, rash, Raynaud's syndrome, uveitis or weight loss  Compliance with total regimen is %  Compliance with medications is %  Thyroid Problem   Presents for follow-up visit  Patient reports no diarrhea, fatigue, palpitations or weight loss  The symptoms have been stable  Her past medical history is significant for hyperlipidemia   There is no history of diabetes or heart failure  The following portions of the patient's history were reviewed and updated as appropriate:   She has a past medical history of Arthritis, Bursitis, Greater trochanteric bursitis of right hip (6/4/2015), Hypertension, Post-menopausal, and RLL pneumonia ([address] )  ,  does not have any pertinent problems on file  ,   has a past surgical history that includes Bladder suspension and Eye surgery  ,  family history includes Colon cancer in her family; Colon cancer (age of onset: 67) in her mother; Coronary artery disease in her family; Diabetes in her father and mother; Diabetes type II in her family; Heart disease in her father and mother  ,   reports that she has never smoked  She has never used smokeless tobacco  She reports that she drinks alcohol  She reports that she does not use drugs  ,  has No Known Allergies     Current Outpatient Medications   Medication Sig Dispense Refill    amLODIPine-benazepril (LOTREL) 10-40 MG per capsule Take 1 capsule by mouth daily 90 capsule 1    Calcium Carbonate-Vitamin D 600-200 MG-UNIT CAPS Take 1 capsule by mouth daily      cholecalciferol (VITAMIN D3) 1,000 units tablet Take 1,000 Units by mouth daily      cyanocobalamin (VITAMIN B-12) 500 mcg tablet Take 500 mcg by mouth daily      diclofenac sodium (VOLTAREN) 1 % Apply 2 g topically 4 (four) times a day 3 Tube 1    diclofenac sodium (VOLTAREN) 50 mg EC tablet Take 1 tablet (50 mg total) by mouth 2 (two) times a day Take with food 180 tablet 2    loratadine (CLARITIN) 10 mg tablet Take 1 tablet by mouth daily as needed      Misc Natural Products (GLUCOSAMINE CHOND MSM FORMULA PO) Take 1 tablet by mouth daily      Omega-3 Fatty Acids (FISH OIL) 1200 MG CAPS Take 1 capsule by mouth daily      TURMERIC PO Take 1 caplet by mouth daily      vitamin E, tocopherol, 400 units capsule Take 1 capsule by mouth daily      zolpidem (AMBIEN) 10 mg tablet 1 po qhs prn insomnia 90 tablet 0    rosuvastatin (CRESTOR) 10 MG tablet Take 1 tablet (10 mg total) by mouth daily 90 tablet 3     No current facility-administered medications for this visit  BMI Counseling: There is no height or weight on file to calculate BMI  The BMI is above normal  Nutrition recommendations include decreasing portion sizes, encouraging healthy choices of fruits and vegetables, decreasing fast food intake, consuming healthier snacks, limiting drinks that contain sugar, moderation in carbohydrate intake, increasing intake of lean protein, reducing intake of saturated and trans fat and reducing intake of cholesterol  Exercise recommendations include moderate physical activity 150 minutes/week, vigorous physical activity 75 minutes/week, exercising 3-5 times per week, obtaining a gym membership and strength training exercises  No pharmacotherapy was ordered  Falls Plan of Care: balance, strength, and gait training instructions were provided  Medications that increase falls were reviewed  Assessed feet and footwear  Review of Systems   Constitutional: Negative for fatigue, fever, malaise/fatigue and weight loss  Eyes: Negative for blurred vision  Respiratory: Negative for shortness of breath  Cardiovascular: Negative for chest pain, palpitations, orthopnea and PND  Gastrointestinal: Negative for diarrhea  Genitourinary: Negative for dysuria  Musculoskeletal: Positive for arthritis and stiffness  Negative for myalgias and neck pain  Skin: Negative for rash  Neurological: Negative for focal weakness and headaches  All other systems reviewed and are negative  Objective:  Vitals:    12/20/19 0931   BP: 130/82   BP Location: Left arm   Patient Position: Sitting   Cuff Size: Large   Pulse: 76   Temp: (!) 97 4 °F (36 3 °C)   TempSrc: Tympanic   SpO2: 97%   Weight: 73 5 kg (162 lb)   Height: 5' 5" (1 651 m)     Body mass index is 26 96 kg/m²  Physical Exam   Constitutional: She is oriented to person, place, and time   She appears well-developed and well-nourished  She is cooperative  HENT:   Head: Normocephalic and atraumatic  Right Ear: Tympanic membrane, external ear and ear canal normal    Left Ear: Tympanic membrane, external ear and ear canal normal    Nose: Nose normal    Mouth/Throat: Uvula is midline, oropharynx is clear and moist and mucous membranes are normal    Eyes: Pupils are equal, round, and reactive to light  Conjunctivae, EOM and lids are normal    Neck: Trachea normal, normal range of motion, full passive range of motion without pain and phonation normal  Neck supple  No JVD present  No thyroid mass and no thyromegaly present  Cardiovascular: Normal rate, regular rhythm, S1 normal, S2 normal, normal heart sounds, intact distal pulses and normal pulses  Exam reveals no gallop and no friction rub  No murmur heard  Pulmonary/Chest: Effort normal and breath sounds normal  She has no decreased breath sounds  Abdominal: Soft  Normal appearance and bowel sounds are normal  There is no hepatosplenomegaly  There is no tenderness  No hernia  Genitourinary:   Genitourinary Comments: Deferred    Musculoskeletal: Normal range of motion  Neurological: She is alert and oriented to person, place, and time  She has normal reflexes  No cranial nerve deficit  Skin: Skin is warm, dry and intact  Capillary refill takes less than 2 seconds  Psychiatric: She has a normal mood and affect  Her speech is normal and behavior is normal  Judgment and thought content normal  Cognition and memory are normal    Nursing note and vitals reviewed

## 2019-12-18 PROBLEM — Z12.11 SCREEN FOR COLON CANCER: Status: RESOLVED | Noted: 2018-09-25 | Resolved: 2019-12-18

## 2019-12-18 PROBLEM — A69.20 LYME DISEASE: Status: RESOLVED | Noted: 2019-02-13 | Resolved: 2019-12-18

## 2019-12-18 PROBLEM — M70.62 TROCHANTERIC BURSITIS OF LEFT HIP: Status: RESOLVED | Noted: 2018-09-25 | Resolved: 2019-12-18

## 2019-12-18 PROBLEM — A69.20 ERYTHEMA MIGRANS (LYME DISEASE): Status: RESOLVED | Noted: 2018-08-14 | Resolved: 2019-12-18

## 2019-12-20 ENCOUNTER — OFFICE VISIT (OUTPATIENT)
Dept: FAMILY MEDICINE CLINIC | Facility: CLINIC | Age: 69
End: 2019-12-20
Payer: MEDICARE

## 2019-12-20 VITALS
WEIGHT: 162 LBS | TEMPERATURE: 97.4 F | HEART RATE: 76 BPM | SYSTOLIC BLOOD PRESSURE: 130 MMHG | HEIGHT: 65 IN | OXYGEN SATURATION: 97 % | DIASTOLIC BLOOD PRESSURE: 82 MMHG | BODY MASS INDEX: 26.99 KG/M2

## 2019-12-20 DIAGNOSIS — Z13.29 SCREENING FOR THYROID DISORDER: ICD-10-CM

## 2019-12-20 DIAGNOSIS — F51.01 PRIMARY INSOMNIA: ICD-10-CM

## 2019-12-20 DIAGNOSIS — Z13.0 SCREENING FOR DEFICIENCY ANEMIA: ICD-10-CM

## 2019-12-20 DIAGNOSIS — M21.612 BUNION OF GREAT TOE OF LEFT FOOT: ICD-10-CM

## 2019-12-20 DIAGNOSIS — Z13.220 SCREENING FOR HYPERLIPIDEMIA: ICD-10-CM

## 2019-12-20 DIAGNOSIS — I10 ESSENTIAL HYPERTENSION: ICD-10-CM

## 2019-12-20 DIAGNOSIS — Z13.1 SCREENING FOR DIABETES MELLITUS: ICD-10-CM

## 2019-12-20 DIAGNOSIS — E55.9 VITAMIN D DEFICIENCY: ICD-10-CM

## 2019-12-20 DIAGNOSIS — E78.01 FAMILIAL HYPERCHOLESTEROLEMIA: Primary | ICD-10-CM

## 2019-12-20 DIAGNOSIS — M21.611 BUNION OF GREAT TOE OF RIGHT FOOT: ICD-10-CM

## 2019-12-20 PROCEDURE — 99214 OFFICE O/P EST MOD 30 MIN: CPT | Performed by: NURSE PRACTITIONER

## 2019-12-20 RX ORDER — ROSUVASTATIN CALCIUM 10 MG/1
10 TABLET, COATED ORAL DAILY
Qty: 90 TABLET | Refills: 3 | Status: SHIPPED | OUTPATIENT
Start: 2019-12-20 | End: 2020-07-15 | Stop reason: ALTCHOICE

## 2019-12-20 NOTE — PATIENT INSTRUCTIONS
Cholesterol and Your Health   AMBULATORY CARE:   Cholesterol  is a waxy, fat-like substance  Cholesterol is made by your body, but also comes from certain foods you eat  Your body uses cholesterol to make hormones and new cells  Your body also uses cholesterol to protect nerves  Cholesterol comes from foods such as meat and dairy products  Your total cholesterol level is made up by LDL cholesterol, HDL cholesterol, and triglycerides:  · LDL cholesterol  is called bad cholesterol  because it forms plaque in your arteries  As plaque builds up, your arteries become narrow, and less blood flows through  When plaque decreases blood flow to your heart, you may have chest pain  If plaque completely blocks an artery that bring blood to your heart, you may have a heart attack  Plaque can break off and form blood clots  Blood clots may block arteries in your brain and cause a stroke  · HDL cholesterol  is called good cholesterol  because it helps remove LDL cholesterol from your arteries  It does this by attaching to LDL cholesterol and carrying it to your liver  Your liver breaks down LDL cholesterol so your body can get rid of it  High levels of HDL cholesterol can help prevent a heart attack and stroke  Low levels of HDL cholesterol can increase your risk for heart disease, heart attack, and stroke  · Triglycerides  are a type of fat that store energy from foods you eat  High levels of triglycerides also cause plaque buildup  This can increase your risk for a heart attack or stroke  If your triglyceride level is high, your LDL cholesterol level may also be high  How food affects your cholesterol levels:   · Unhealthy fats  increase LDL cholesterol and triglyceride levels in your blood  They are found in foods high in cholesterol, saturated fat, and trans fat:     ¨ Cholesterol  is found in eggs, dairy, and meat  ¨ Saturated fat  is found in butter, cheese, ice cream, whole milk, and coconut oil  Saturated fat is also found in meat, such as sausage, hot dogs, and bologna  ¨ Trans fat  is found in liquid oils and is used in fried and baked foods  Foods that contain trans fats include chips, crackers, muffins, sweet rolls, microwave popcorn, and cookies  · Healthy fats,  also called unsaturated fats, help lower LDL cholesterol and triglyceride levels  Healthy fats include the following:     ¨ Monounsaturated fats  are found in foods such as olive oil, canola oil, avocado, nuts, and olives  ¨ Polyunsaturated fats,  such as omega 3 fats, are found in fish, such as salmon, trout, and tuna  They can also be found in plant foods such as flaxseed, walnuts, and soybeans  Other things that affect your cholesterol levels:   · Smoking cigarettes    · Being overweight or obese     · Drinking large amounts of alcohol    · Not enough exercise or no exercise    · Certain genes passed from your parents to you  What you need to know about having your cholesterol levels checked: Adults 21to 39years of age should have their cholesterol levels checked every 4 to 6 years  Adults 45 years and older should have their cholesterol checked every 1 to 2 years  You may need your cholesterol checked more often, or at a younger age, if you have risk factors for heart disease  You may also need to have your cholesterol checked more often if you have other health conditions, such as diabetes  Blood tests are used to check cholesterol levels  Blood tests measure your levels of triglycerides, LDL cholesterol, and HDL cholesterol  Cholesterol level goals: Your cholesterol level goal may depend on your risk for heart disease  It may also depend on your age and other health conditions  Ask your healthcare provider if the following goals are right for you:  · Your total cholesterol level  should be less than 200 mg/dL  This number may also depend on your HDL and LDL cholesterol goals       · Your LDL cholesterol level  should be less than 130 mg/dL  · Your HDL cholesterol level  should be 60 mg/dL or higher  · Your triglyceride level  should be less than 150 mg/dL  Treatment for high cholesterol:  Treatment for high cholesterol will also decrease your risk of heart disease, heart attack, and stroke  Treatment may include any of the following:  · Medicines  may be given to lower your LDL cholesterol, triglyceride levels, or total cholesterol level  You may need medicines to lower your cholesterol if any of the following is true:     ¨ You have a history of stroke, TIA, unstable angina, or a heart attack    ¨ Your LDL cholesterol level is 190 mg/dL or higher    ¨ You are age 36to 76years of age, have diabetes, and your LDL cholesterol is 70 mg/dL or higher    ¨ You are age 36to 76years of age, have risk factors for heart disease, and your LDL cholesterol is 70 mg/dL or higher    · Lifestyle changes  include changes to your diet, exercise, weight loss, and quitting smoking  It also includes decreasing the amount of alcohol you drink  · Supplements  include fish oil, red yeast rice, and garlic  Fish oil may help lower your triglyceride and LDL cholesterol levels  It may also increase your HDL cholesterol level  Red yeast rice may help decrease your total cholesterol level and LDL cholesterol level  Garlic may help lower your total cholesterol level  Do not take these supplements without talking to your healthcare provider  Nutrition to help lower your cholesterol levels:  A registered dietitian can help you create a healthy eating plan  Read food labels and choose foods low in saturated fat, trans fats, and cholesterol  · Decrease the total amount of fat you eat  Choose lean meats, fat-free or 1% fat milk, and low-fat dairy products, such as yogurt and cheese  Try to limit or avoid red meats  Limit or do not eat fried foods or baked goods such as cookies  · Replace unhealthy fats with healthy fats    Cook foods in olive oil or canola oil  Choose soft margarines that are low in saturated fat and trans fat  Seeds, nuts, and avocados are other examples of healthy fats  · Eat foods with omega-3 fats  Examples include salmon, tuna, mackerel, walnuts, and flaxseed  Eat fish 2 times per week  Children and pregnant women should not eat fish that have high levels of mercury, such as shark, swordfish, and navi mackerel  · Increase the amount of plant-based foods you eat  Plant-based foods are low in cholesterol and fat  Eating more of these foods may help lower your cholesterol and help you lose weight  Examples of plant-based foods includes fruits, vegetables, legumes, and whole grains  Replace milk that contains dairy with almond, soy, or coconut milk  Eat beans and foods with soy for protein instead of meat  Ask your healthcare provider or dietitian for more information on plant-based foods  · Increase the amount of fiber you eat  High-fiber foods can help lower your LDL cholesterol  You should eat between 20 and 30 grams of fiber each day  Eat at least 5 servings of fruits and vegetables each day  Other examples of high-fiber foods include whole-grain or whole-wheat breads, pastas, or cereals, and brown rice  Eat 3 ounces of whole-grain foods each day  Increase fiber slowly  You may have abdominal discomfort, bloating, and gas if you add fiber to your diet too quickly  Lifestyle changes you can make to help lower your cholesterol levels:   · Maintain a healthy weight  Ask your healthcare provider how much you should weigh  Ask him or her to help you create a weight loss plan if you are overweight  Weight loss can decrease your total cholesterol and triglyceride levels  · Exercise regularly  Exercise can help lower your total cholesterol level and maintain a healthy weight  Exercise can also help increase your HDL cholesterol level   Work with your healthcare provider to create an exercise program that is right for you  Get at least 30 minutes of moderate exercise most days of the week  Examples of exercise include brisk walking, swimming, or biking  · Do not smoke  Nicotine and other chemicals in cigarettes and cigars can damage your lungs, heart, and blood vessels  They can also raise your triglyceride levels  Ask your healthcare provider for information if you currently smoke and need help to quit  E-cigarettes or smokeless tobacco still contain nicotine  Talk to your healthcare provider before you use these products  · Limit or do not drink alcohol  Alcohol can increase your triglyceride levels  Ask your healthcare provider if it is safe for you to drink alcohol  Also ask how much is safe for you to drink each day  © 2017 [address] [facility] Information is for End User's use only and may not be sold, redistributed or otherwise used for commercial purposes  All illustrations and images included in CareNotes® are the copyrighted property of [employer]  or [employer]  The above information is an  only  It is not intended as medical advice for individual conditions or treatments  Talk to your doctor, nurse or pharmacist before following any medical regimen to see if it is safe and effective for you  Low Fat Diet   AMBULATORY CARE:   A low-fat diet  is an eating plan that is low in total fat, unhealthy fat, and cholesterol  You may need to follow a low-fat diet if you have trouble digesting or absorbing fat  You may also need to follow this diet if you have high cholesterol  You can also lower your cholesterol by increasing the amount of fiber in your diet  Soluble fiber is a type of fiber that helps to decrease cholesterol levels  Different types of fat in food:   · Limit unhealthy fats  A diet that is high in cholesterol, saturated fat, and trans fat may cause unhealthy cholesterol levels   Unhealthy cholesterol levels increase your risk of heart disease  ¨ Cholesterol:  Limit intake of cholesterol to less than 200 mg per day  Cholesterol is found in meat, eggs, and dairy  ¨ Saturated fat:  Limit saturated fat to less than 7% of your total daily calories  Ask your dietitian how many calories you need each day  Saturated fat is found in butter, cheese, ice cream, whole milk, and palm oil  Saturated fat is also found in meat, such as beef, pork, chicken skin, and processed meats  Processed meats include sausage, hot dogs, and bologna  ¨ Trans fat:  Avoid trans fat as much as possible  Trans fat is used in fried and baked foods  Foods that say trans fat free on the label may still have up to 0 5 grams of trans fat per serving  · Include healthy fats  Replace foods that are high in saturated and trans fat with foods high in healthy fats  This may help to decrease high cholesterol levels  ¨ Monounsaturated fats: These are found in avocados, nuts, and vegetable oils, such as olive, canola, and sunflower oil  ¨ Polyunsaturated fats: These can be found in vegetable oils, such as soybean or corn oil  Omega-3 fats can help to decrease the risk of heart disease  Omega-3 fats are found in fish, such as salmon, herring, trout, and tuna  Omega-3 fats can also be found in plant foods, such as walnuts, flaxseed, soybeans, and canola oil    Foods to limit or avoid:   · Grains:      ¨ Snacks that are made with partially hydrogenated oils, such as chips, regular crackers, and butter-flavored popcorn    ¨ High-fat baked goods, such as biscuits, croissants, doughnuts, pies, cookies, and pastries    · Dairy:      ¨ Whole milk, 2% milk, and yogurt and ice cream made with whole milk    ¨ Half and half creamer, heavy cream, and whipping cream    ¨ Cheese, cream cheese, and sour cream    · Meats and proteins:      ¨ High-fat cuts of meat (T-bone steak, regular hamburger, and ribs)    ¨ [facility], poultry (turkey and chicken), and fish    ¨ Poultry (chicken and turkey) with skin    ¨ Cold cuts (salami or bologna), hot dogs, quigley, and sausage    ¨ Whole eggs and egg yolks    · Vegetables and fruits with added fat:      ¨ Fried vegetables or vegetables in butter or high-fat sauces, such as cream or cheese sauces    ¨ Fried fruit or fruit served with butter or cream    · Fats:      ¨ Butter, stick margarine, and shortening    ¨ Coconut, palm oil, and palm kernel oil  Foods to include:   · Grains:      ¨ Whole-grain breads, cereals, pasta, and brown rice    ¨ Low-fat crackers and pretzels    · Vegetables and fruits:      ¨ Fresh, frozen, or canned vegetables (no salt or low-sodium)    ¨ Fresh, frozen, dried, or canned fruit (canned in light syrup or fruit juice)    ¨ Avocado    · Low-fat dairy products:      ¨ Nonfat (skim) or 1% milk    ¨ Nonfat or low-fat cheese, yogurt, and cottage cheese    · Meats and proteins:      ¨ Chicken or turkey with no skin    ¨ Baked or broiled fish    ¨ Lean beef and pork (loin, round, extra lean hamburger)    ¨ Beans and peas, unsalted nuts, soy products    ¨ Egg whites and substitutes    ¨ Seeds and nuts    · Fats:      ¨ Unsaturated oil, such as canola, olive, peanut, soybean, or sunflower oil    ¨ Soft or liquid margarine and vegetable oil spread    ¨ Low-fat salad dressing  Other ways to decrease fat:   · Read food labels before you buy foods  Choose foods that have less than 30% of calories from fat  Choose low-fat or fat-free dairy products  Remember that fat free does not mean calorie free  These foods still contain calories, and too many calories can lead to weight gain  · Trim fat from meat and avoid fried food  Trim all visible fat from meat before you cook it  Remove the skin from poultry  Do not bustillo meat, fish, or poultry  Bake, roast, boil, or broil these foods instead  Avoid fried foods  Eat a baked potato instead of Western Danielle fries  Steam vegetables instead of sautéing them in butter  · Add less fat to foods    Use imitation quigley bits on salads and baked potatoes instead of regular quigley bits  Use fat-free or low-fat salad dressings instead of regular dressings  Use low-fat or nonfat butter-flavored topping instead of regular butter or margarine on popcorn and other foods  Ways to decrease fat in recipes:  Replace high-fat ingredients with low-fat or nonfat ones  This may cause baked goods to be drier than usual  You may need to use nonfat cooking spray on pans to prevent food from sticking  You also may need to change the amount of other ingredients, such as water, in the recipe  Try the following:  · Use low-fat or light margarine instead of regular margarine or shortening  · Use lean ground turkey breast or chicken, or lean ground beef (less than 5% fat) instead of hamburger  · Add 1 teaspoon of canola oil to 8 ounces of skim milk instead of using cream or half and half  · Use grated zucchini, carrots, or apples in breads instead of coconut  · Use blenderized, low-fat cottage cheese, plain tofu, or low-fat ricotta cheese instead of cream cheese  · Use 1 egg white and 1 teaspoon of canola oil, or use ¼ cup (2 ounces) of fat-free egg substitute instead of a whole egg  · Replace half of the oil that is called for in a recipe with applesauce when you bake  Use 3 tablespoons of cocoa powder and 1 tablespoon of canola oil instead of a square of baking chocolate  How to increase fiber:  Eat enough high-fiber foods to get 20 to 30 grams of fiber every day  Slowly increase your fiber intake to avoid stomach cramps, gas, and other problems  · Eat 3 ounces of whole-grain foods each day  An ounce is about 1 slice of bread  Eat whole-grain breads, such as whole-wheat bread  Whole wheat, whole-wheat flour, or other whole grains should be listed as the first ingredient on the food label  Replace white flour with whole-grain flour or use half of each in recipes   Whole-grain flour is heavier than white flour, so you may have to add more yeast or baking powder  · Eat a high-fiber cereal for breakfast   Oatmeal is a good source of soluble fiber  Look for cereals that have bran or fiber in the name  Choose whole-grain products, such as brown rice, barley, and whole-wheat pasta  · Eat more beans, peas, and lentils  For example, add beans to soups or salads  Eat at least 5 cups of fruits and vegetables each day  Eat fruits and vegetables with the peel because the peel is high in fiber  © 2017 [address] [facility] Information is for End User's use only and may not be sold, redistributed or otherwise used for commercial purposes  All illustrations and images included in CareNotes® are the copyrighted property of [employer] , [employer]  or [employer]  The above information is an  only  It is not intended as medical advice for individual conditions or treatments  Talk to your doctor, nurse or pharmacist before following any medical regimen to see if it is safe and effective for you  Lipid Profile   AMBULATORY CARE:   A lipid profile,  or lipid panel, is a blood test to check your lipid levels  Lipids are fats that cannot dissolve in blood  High lipid levels increase your risk for heart disease and a heart attack or stroke  A lipid profile includes the following:  · Total cholesterol  is the main number used for cholesterol values  ¨ Goal: Less than 200 mg/dL    ¨ Borderline high: 200 to 239 mg/dL    ¨ High: 240 mg/dL or higher    · LDL (bad) cholesterol  carries cholesterol and deposits it in the arteries  This can cause a blockage  ¨ Goal: 100 mg/dL or lower    ¨ Near goal: 100 to 129 mg/dL     ¨ Borderline high: 130 to 159 mg/dL    ¨ High: 160 to 189 mg/dL    ¨ Very high: 190 mg/dL or higher    · HDL (good) cholesterol  removes cholesterol from your body       ¨ Goal: 60 mg/dL or higher    ¨ Borderline risk: 40 to 59 mg/dL    ¨ High risk: 40 mg/dL or lower    · Triglycerides  are a different kind of fat than cholesterol  ¨ Goal: 150 mg/dL or lower    ¨ Borderline high: 150 to 199 mg/dL    ¨ High: 200 to 499 mg/dL    ¨ Very high: 500 mg/dL or higher  How to prepare for the test:  Do not eat or drink anything, except water, for 12 to 14 hours before the test  Ask your healthcare provider if you should take your medicines on the day of your test    What you need to know about your test results: Your healthcare provider will discuss your test results with you  If your test results are abnormal, you may need treatment to decrease your risk for heart disease  © 2017 [address] [employer] Information is for End User's use only and may not be sold, redistributed or otherwise used for commercial purposes  All illustrations and images included in CareNotes® are the copyrighted property of [employer]  or [employer]  The above information is an  only  It is not intended as medical advice for individual conditions or treatments  Talk to your doctor, nurse or pharmacist before following any medical regimen to see if it is safe and effective for you  Bunion   AMBULATORY CARE:   A bunion  is a bony lump at the base of your big toe  As it grows, it sticks out from the side of your foot and may move your toe out of place  Common symptoms include the following:   · Foot pain and stiffness    · Big toe is turned inward and may overlap other toes    · A callus (thickened skin) at the base of the big toe that may have fluid under it  Seek care immediately if:   · You have severe pain in your toe  · You cannot put weight on your foot  Contact your healthcare provider if:   · You cannot do your daily activities because of the pain  · You have questions or concerns about your condition or care  Medicines: You may need any of the following:  · Acetaminophen  decreases pain  It is available without a doctor's order   Ask how much to take and how often to take it  Follow directions  Acetaminophen can cause liver damage if not taken correctly  · NSAIDs , such as ibuprofen, help decrease swelling, pain, and fever  This medicine is available with or without a doctor's order  NSAIDs can cause stomach bleeding or kidney problems in certain people  If you take blood thinner medicine, always ask your healthcare provider if NSAIDs are safe for you  Always read the medicine label and follow directions  · Take your medicine as directed  Contact your healthcare provider if you think your medicine is not helping or if you have side effects  Tell him of her if you are allergic to any medicine  Keep a list of the medicines, vitamins, and herbs you take  Include the amounts, and when and why you take them  Bring the list or the pill bottles to follow-up visits  Carry your medicine list with you in case of an emergency  Self-care:   · Use a bunion pad  Wear a thick, ring-shaped pad around and over the bunion to cushion it  · Wear shoes that fit well  Wear wide, low-heeled shoes that have plenty of room for your toes  Do not wear tight shoes or heels that are higher than 2 inches  · Wear shoe inserts or arch supports  These will decrease pressure on the bunion  · Separate your big toe at night  Separate the big toe from the others with a foam pad while you sleep  Use a light elastic bandage to keep the pad in place  · Stretch your foot each day  This will help decrease pressure and increase foot strength  Ask what foot exercises are best for you  · Apply ice  on your toe for 15 to 20 minutes every hour or as directed  Use an ice pack or put crushed ice in a plastic bag  Cover it with a towel  Ice helps prevent tissue damage and decreases swelling and pain  · Go to physical therapy if directed  A physical therapist teaches you exercises to help improve movement and strength, and to decrease pain    Follow up with your healthcare provider as directed: You may be referred to a podiatrist (foot specialist)  Write down your questions so you remember to ask them during your visits  © 2017 [address] [employer] Information is for End User's use only and may not be sold, redistributed or otherwise used for commercial purposes  All illustrations and images included in CareNotes® are the copyrighted property of [employer]  or [employer]  The above information is an  only  It is not intended as medical advice for individual conditions or treatments  Talk to your doctor, nurse or pharmacist before following any medical regimen to see if it is safe and effective for you

## 2019-12-21 RX ORDER — ZOLPIDEM TARTRATE 10 MG/1
TABLET ORAL
Qty: 90 TABLET | Refills: 0 | Status: SHIPPED | OUTPATIENT
Start: 2019-12-21 | End: 2020-05-28

## 2019-12-21 RX ORDER — AMLODIPINE BESYLATE AND BENAZEPRIL HYDROCHLORIDE 10; 40 MG/1; MG/1
1 CAPSULE ORAL DAILY
Qty: 90 CAPSULE | Refills: 1 | Status: SHIPPED | OUTPATIENT
Start: 2019-12-21 | End: 2020-06-03

## 2019-12-23 ENCOUNTER — OFFICE VISIT (OUTPATIENT)
Dept: URGENT CARE | Facility: CLINIC | Age: 69
End: 2019-12-23
Payer: MEDICARE

## 2019-12-23 ENCOUNTER — APPOINTMENT (OUTPATIENT)
Dept: RADIOLOGY | Facility: CLINIC | Age: 69
End: 2019-12-23
Payer: MEDICARE

## 2019-12-23 VITALS
WEIGHT: 162 LBS | RESPIRATION RATE: 18 BRPM | HEART RATE: 71 BPM | DIASTOLIC BLOOD PRESSURE: 86 MMHG | SYSTOLIC BLOOD PRESSURE: 134 MMHG | OXYGEN SATURATION: 99 % | HEIGHT: 65 IN | BODY MASS INDEX: 26.99 KG/M2 | TEMPERATURE: 98.8 F

## 2019-12-23 DIAGNOSIS — M25.532 LEFT WRIST PAIN: Primary | ICD-10-CM

## 2019-12-23 DIAGNOSIS — S66.912A WRIST STRAIN, LEFT, INITIAL ENCOUNTER: ICD-10-CM

## 2019-12-23 DIAGNOSIS — M25.532 LEFT WRIST PAIN: ICD-10-CM

## 2019-12-23 PROCEDURE — 99213 OFFICE O/P EST LOW 20 MIN: CPT | Performed by: PREVENTIVE MEDICINE

## 2019-12-23 PROCEDURE — G0463 HOSPITAL OUTPT CLINIC VISIT: HCPCS | Performed by: PREVENTIVE MEDICINE

## 2019-12-23 PROCEDURE — 73130 X-RAY EXAM OF HAND: CPT

## 2019-12-23 NOTE — PATIENT INSTRUCTIONS
Elevate the wrist as much as possible  Ice every hour 2  Ibuprofen for pain and swelling  After couple days try the splint on an hour off an hour  If the wrist is not progressing quickly enough you can admit yourself to outpatient physical therapy

## 2019-12-23 NOTE — PROGRESS NOTES
[email or web address] [facility]        NAME: Nilesh Plunkett is a 68 y o  female  : 1950    MRN: [id number]  DATE: 2019  TIME: 1:47 PM    Assessment and Plan   Left wrist pain [M25 532]  1  Left wrist pain  XR wrist 3+ vw left    XR hand 3+ vw left   2  Wrist strain, left, initial encounter           Patient Instructions       Follow up with PCP in 3-5 days  Proceed to  ER if symptoms worsen  Chief Complaint     Chief Complaint   Patient presents with    Wrist Pain     Pt reports she fell on the ice this morning and hurt left wrist with swelling  History of Present Illness       She fell and she fell she braced herself with her left hand and wrist   She now has pain over the left anterior wrist      Review of Systems   Review of Systems   Musculoskeletal: Positive for arthralgias           Current Medications       Current Outpatient Medications:     amLODIPine-benazepril (LOTREL) 10-40 MG per capsule, Take 1 capsule by mouth daily, Disp: 90 capsule, Rfl: 1    Calcium Carbonate-Vitamin D 600-200 MG-UNIT CAPS, Take 1 capsule by mouth daily, Disp: , Rfl:     cholecalciferol (VITAMIN D3) 1,000 units tablet, Take 1,000 Units by mouth daily, Disp: , Rfl:     cyanocobalamin (VITAMIN B-12) 500 mcg tablet, Take 500 mcg by mouth daily, Disp: , Rfl:     diclofenac sodium (VOLTAREN) 1 %, Apply 2 g topically 4 (four) times a day, Disp: 3 Tube, Rfl: 1    diclofenac sodium (VOLTAREN) 50 mg EC tablet, Take 1 tablet (50 mg total) by mouth 2 (two) times a day Take with food, Disp: 180 tablet, Rfl: 2    loratadine (CLARITIN) 10 mg tablet, Take 1 tablet by mouth daily as needed, Disp: , Rfl:     Misc Natural Products (GLUCOSAMINE CHOND MSM FORMULA PO), Take 1 tablet by mouth daily, Disp: , Rfl:     Omega-3 Fatty Acids (FISH OIL) 1200 MG CAPS, Take 1 capsule by mouth daily, Disp: , Rfl:     rosuvastatin (CRESTOR) 10 MG tablet, Take 1 tablet (10 mg total) by mouth daily, Disp: 90 tablet, Rfl: 3   TURMERIC PO, Take 1 caplet by mouth daily, Disp: , Rfl:     vitamin E, tocopherol, 400 units capsule, Take 1 capsule by mouth daily, Disp: , Rfl:     zolpidem (AMBIEN) 10 mg tablet, 1 po qhs prn insomnia, Disp: 90 tablet, Rfl: 0    Current Allergies     Allergies as of 12/23/2019    (No Known Allergies)            The following portions of the patient's history were reviewed and updated as appropriate: allergies, current medications, past family history, past medical history, past social history, past surgical history and problem list      Past Medical History:   Diagnosis Date    Arthritis     Bursitis     bilateral hips     Greater trochanteric bursitis of right hip 6/4/2015    Right greater trochanteri bursitis causing symptoms of pain and discomfort and altering gait    Hypertension     Post-menopausal     RLL pneumonia ([facility] [address] )     Last Assessed:11/4/14       Past Surgical History:   Procedure Laterality Date    BLADDER SUSPENSION      EYE SURGERY      Retinal Detachment complex       Family History   Problem Relation Age of Onset    Heart disease Mother         Coronary disease    Colon cancer Mother 67    Diabetes Mother     Heart disease Father         Coronary disease    Diabetes Father     Colon cancer Family     Coronary artery disease Family     Diabetes type II Family          Medications have been verified  Objective   /86 (BP Location: Right arm, Patient Position: Sitting)   Pulse 71   Temp 98 8 °F (37 1 °C) (Temporal)   Resp 18   Ht 5' 5" (1 651 m)   Wt 73 5 kg (162 lb)   SpO2 99%   BMI 26 96 kg/m²        Physical Exam     Physical Exam   Musculoskeletal:   The left wrist is not warm red or swollen  There is an obvious hematoma just below the wrist crease on the anterior surface  She has moderate range of motion of the wrist and moderate range of motion of the hand with flexion and extension       X-ray reveals no acute changes

## 2020-01-02 ENCOUNTER — TELEPHONE (OUTPATIENT)
Dept: URGENT CARE | Facility: CLINIC | Age: 70
End: 2020-01-02

## 2020-02-12 ENCOUNTER — TRANSCRIBE ORDERS (OUTPATIENT)
Dept: ADMINISTRATIVE | Facility: HOSPITAL | Age: 70
End: 2020-02-12

## 2020-02-12 DIAGNOSIS — Z12.31 SCREENING MAMMOGRAM FOR HIGH-RISK PATIENT: Primary | ICD-10-CM

## 2020-02-12 DIAGNOSIS — Z12.39 SCREENING FOR BREAST CANCER: Primary | ICD-10-CM

## 2020-04-20 DIAGNOSIS — M70.61 GREATER TROCHANTERIC BURSITIS OF RIGHT HIP: ICD-10-CM

## 2020-05-03 ENCOUNTER — TELEPHONE (OUTPATIENT)
Dept: FAMILY MEDICINE CLINIC | Facility: CLINIC | Age: 70
End: 2020-05-03

## 2020-05-28 DIAGNOSIS — F51.01 PRIMARY INSOMNIA: ICD-10-CM

## 2020-05-28 RX ORDER — ZOLPIDEM TARTRATE 10 MG/1
TABLET ORAL
Qty: 90 TABLET | Refills: 0 | Status: SHIPPED | OUTPATIENT
Start: 2020-05-28 | End: 2021-08-11 | Stop reason: SDUPTHER

## 2020-05-29 ENCOUNTER — TELEPHONE (OUTPATIENT)
Dept: FAMILY MEDICINE CLINIC | Facility: CLINIC | Age: 70
End: 2020-05-29

## 2020-06-03 DIAGNOSIS — I10 ESSENTIAL HYPERTENSION: ICD-10-CM

## 2020-06-03 RX ORDER — AMLODIPINE BESYLATE AND BENAZEPRIL HYDROCHLORIDE 10; 40 MG/1; MG/1
1 CAPSULE ORAL DAILY
Qty: 90 CAPSULE | Refills: 1 | Status: SHIPPED | OUTPATIENT
Start: 2020-06-03 | End: 2020-10-23

## 2020-06-18 ENCOUNTER — APPOINTMENT (OUTPATIENT)
Dept: LAB | Facility: CLINIC | Age: 70
End: 2020-06-18
Payer: MEDICARE

## 2020-06-18 ENCOUNTER — TRANSCRIBE ORDERS (OUTPATIENT)
Dept: LAB | Facility: CLINIC | Age: 70
End: 2020-06-18

## 2020-06-18 DIAGNOSIS — Z13.220 SCREENING FOR HYPERLIPIDEMIA: ICD-10-CM

## 2020-06-18 DIAGNOSIS — Z13.0 SCREENING FOR DEFICIENCY ANEMIA: ICD-10-CM

## 2020-06-18 DIAGNOSIS — E55.9 VITAMIN D DEFICIENCY: ICD-10-CM

## 2020-06-18 DIAGNOSIS — Z13.29 SCREENING FOR THYROID DISORDER: ICD-10-CM

## 2020-06-18 DIAGNOSIS — Z13.1 SCREENING FOR DIABETES MELLITUS: ICD-10-CM

## 2020-06-18 LAB
25(OH)D3 SERPL-MCNC: 55.5 NG/ML (ref 30–100)
ALBUMIN SERPL BCP-MCNC: 3.9 G/DL (ref 3.5–5)
ALP SERPL-CCNC: 62 U/L (ref 46–116)
ALT SERPL W P-5'-P-CCNC: 24 U/L (ref 12–78)
ANION GAP SERPL CALCULATED.3IONS-SCNC: 3 MMOL/L (ref 4–13)
AST SERPL W P-5'-P-CCNC: 15 U/L (ref 5–45)
BASOPHILS # BLD AUTO: 0.05 THOUSANDS/ΜL (ref 0–0.1)
BASOPHILS NFR BLD AUTO: 1 % (ref 0–1)
BILIRUB SERPL-MCNC: 0.56 MG/DL (ref 0.2–1)
BUN SERPL-MCNC: 25 MG/DL (ref 5–25)
CALCIUM SERPL-MCNC: 10 MG/DL (ref 8.3–10.1)
CHLORIDE SERPL-SCNC: 109 MMOL/L (ref 100–108)
CHOLEST SERPL-MCNC: 242 MG/DL (ref 50–200)
CO2 SERPL-SCNC: 27 MMOL/L (ref 21–32)
CREAT SERPL-MCNC: 1.03 MG/DL (ref 0.6–1.3)
EOSINOPHIL # BLD AUTO: 0.31 THOUSAND/ΜL (ref 0–0.61)
EOSINOPHIL NFR BLD AUTO: 4 % (ref 0–6)
ERYTHROCYTE [DISTWIDTH] IN BLOOD BY AUTOMATED COUNT: 14 % (ref 11.6–15.1)
GFR SERPL CREATININE-BSD FRML MDRD: 55 ML/MIN/1.73SQ M
GLUCOSE P FAST SERPL-MCNC: 90 MG/DL (ref 65–99)
HCT VFR BLD AUTO: 41 % (ref 34.8–46.1)
HDLC SERPL-MCNC: 54 MG/DL
HGB BLD-MCNC: 12.8 G/DL (ref 11.5–15.4)
IMM GRANULOCYTES # BLD AUTO: 0.01 THOUSAND/UL (ref 0–0.2)
IMM GRANULOCYTES NFR BLD AUTO: 0 % (ref 0–2)
LDLC SERPL CALC-MCNC: 161 MG/DL (ref 0–100)
LYMPHOCYTES # BLD AUTO: 3.36 THOUSANDS/ΜL (ref 0.6–4.47)
LYMPHOCYTES NFR BLD AUTO: 48 % (ref 14–44)
MCH RBC QN AUTO: 31.2 PG (ref 26.8–34.3)
MCHC RBC AUTO-ENTMCNC: 31.2 G/DL (ref 31.4–37.4)
MCV RBC AUTO: 100 FL (ref 82–98)
MONOCYTES # BLD AUTO: 0.68 THOUSAND/ΜL (ref 0.17–1.22)
MONOCYTES NFR BLD AUTO: 10 % (ref 4–12)
NEUTROPHILS # BLD AUTO: 2.63 THOUSANDS/ΜL (ref 1.85–7.62)
NEUTS SEG NFR BLD AUTO: 37 % (ref 43–75)
NONHDLC SERPL-MCNC: 188 MG/DL
NRBC BLD AUTO-RTO: 0 /100 WBCS
PLATELET # BLD AUTO: 296 THOUSANDS/UL (ref 149–390)
PMV BLD AUTO: 10.6 FL (ref 8.9–12.7)
POTASSIUM SERPL-SCNC: 4.1 MMOL/L (ref 3.5–5.3)
PROT SERPL-MCNC: 7.5 G/DL (ref 6.4–8.2)
RBC # BLD AUTO: 4.1 MILLION/UL (ref 3.81–5.12)
SODIUM SERPL-SCNC: 139 MMOL/L (ref 136–145)
TRIGL SERPL-MCNC: 133 MG/DL
TSH SERPL DL<=0.05 MIU/L-ACNC: 3.46 UIU/ML (ref 0.36–3.74)
WBC # BLD AUTO: 7.04 THOUSAND/UL (ref 4.31–10.16)

## 2020-06-18 PROCEDURE — 85025 COMPLETE CBC W/AUTO DIFF WBC: CPT

## 2020-06-18 PROCEDURE — 80061 LIPID PANEL: CPT

## 2020-06-18 PROCEDURE — 84443 ASSAY THYROID STIM HORMONE: CPT

## 2020-06-18 PROCEDURE — 80053 COMPREHEN METABOLIC PANEL: CPT

## 2020-06-18 PROCEDURE — 36415 COLL VENOUS BLD VENIPUNCTURE: CPT

## 2020-06-18 PROCEDURE — 82306 VITAMIN D 25 HYDROXY: CPT

## 2020-06-26 ENCOUNTER — HOSPITAL ENCOUNTER (OUTPATIENT)
Dept: MAMMOGRAPHY | Facility: HOSPITAL | Age: 70
Discharge: HOME | End: 2020-06-26
Payer: MEDICARE

## 2020-06-26 VITALS — BODY MASS INDEX: 26.99 KG/M2 | WEIGHT: 162 LBS | HEIGHT: 65 IN

## 2020-06-26 DIAGNOSIS — Z12.39 SCREENING FOR BREAST CANCER: ICD-10-CM

## 2020-06-26 DIAGNOSIS — Z12.31 VISIT FOR SCREENING MAMMOGRAM: ICD-10-CM

## 2020-06-26 PROCEDURE — 77067 SCR MAMMO BI INCL CAD: CPT

## 2020-06-26 PROCEDURE — 77063 BREAST TOMOSYNTHESIS BI: CPT

## 2020-07-15 ENCOUNTER — OFFICE VISIT (OUTPATIENT)
Dept: FAMILY MEDICINE CLINIC | Facility: CLINIC | Age: 70
End: 2020-07-15
Payer: MEDICARE

## 2020-07-15 VITALS
OXYGEN SATURATION: 97 % | WEIGHT: 160.2 LBS | BODY MASS INDEX: 26.69 KG/M2 | HEART RATE: 74 BPM | DIASTOLIC BLOOD PRESSURE: 72 MMHG | HEIGHT: 65 IN | TEMPERATURE: 99.3 F | SYSTOLIC BLOOD PRESSURE: 122 MMHG

## 2020-07-15 DIAGNOSIS — E66.3 OVERWEIGHT WITH BODY MASS INDEX (BMI) OF 26 TO 26.9 IN ADULT: ICD-10-CM

## 2020-07-15 DIAGNOSIS — Z13.31 DEPRESSION SCREENING NEGATIVE: ICD-10-CM

## 2020-07-15 DIAGNOSIS — Z00.00 ENCOUNTER FOR MEDICARE ANNUAL WELLNESS EXAM: Primary | ICD-10-CM

## 2020-07-15 DIAGNOSIS — E78.01 FAMILIAL HYPERCHOLESTEROLEMIA: ICD-10-CM

## 2020-07-15 DIAGNOSIS — I10 ESSENTIAL HYPERTENSION: ICD-10-CM

## 2020-07-15 PROCEDURE — 1123F ACP DISCUSS/DSCN MKR DOCD: CPT | Performed by: NURSE PRACTITIONER

## 2020-07-15 PROCEDURE — 1170F FXNL STATUS ASSESSED: CPT | Performed by: NURSE PRACTITIONER

## 2020-07-15 PROCEDURE — 3074F SYST BP LT 130 MM HG: CPT | Performed by: NURSE PRACTITIONER

## 2020-07-15 PROCEDURE — 3008F BODY MASS INDEX DOCD: CPT | Performed by: NURSE PRACTITIONER

## 2020-07-15 PROCEDURE — 4040F PNEUMOC VAC/ADMIN/RCVD: CPT | Performed by: NURSE PRACTITIONER

## 2020-07-15 PROCEDURE — G0439 PPPS, SUBSEQ VISIT: HCPCS | Performed by: NURSE PRACTITIONER

## 2020-07-15 PROCEDURE — 3078F DIAST BP <80 MM HG: CPT | Performed by: NURSE PRACTITIONER

## 2020-07-15 PROCEDURE — 1125F AMNT PAIN NOTED PAIN PRSNT: CPT | Performed by: NURSE PRACTITIONER

## 2020-07-15 PROCEDURE — 1036F TOBACCO NON-USER: CPT | Performed by: NURSE PRACTITIONER

## 2020-07-15 PROCEDURE — 1160F RVW MEDS BY RX/DR IN RCRD: CPT | Performed by: NURSE PRACTITIONER

## 2020-07-15 RX ORDER — MELATONIN 5 MG
1 TABLET,CHEWABLE ORAL AS NEEDED
COMMUNITY
End: 2021-08-16 | Stop reason: ALTCHOICE

## 2020-07-15 NOTE — PROGRESS NOTES
Assessment and Plan:     Problem List Items Addressed This Visit     Familial hypercholesterolemia    Essential hypertension      Other Visit Diagnoses     Encounter for Medicare annual wellness exam    -  Primary    Depression screening negative        Overweight with body mass index (BMI) of 26 to 26 9 in adult        Discussed diet modification exercise regimen        BMI Counseling: Body mass index is 26 66 kg/m²  The BMI is above normal  Nutrition recommendations include decreasing portion sizes, encouraging healthy choices of fruits and vegetables, decreasing fast food intake, consuming healthier snacks, limiting drinks that contain sugar, moderation in carbohydrate intake, increasing intake of lean protein, reducing intake of saturated and trans fat and reducing intake of cholesterol  Exercise recommendations include vigorous physical activity 75 minutes/week, exercising 3-5 times per week, obtaining a gym membership and strength training exercises  No pharmacotherapy was ordered  Depression Screening and Follow-up Plan: Patient's depression screening was positive with a PHQ-2 score of 0  Clincally patient does not have depression  No treatment is required  Falls Plan of Care: balance, strength, and gait training instructions were provided  Medications that increase falls were reviewed  Assessed feet and footwear  Preventive health issues were discussed with patient, and age appropriate screening tests were ordered as noted in patient's After Visit Summary  Personalized health advice and appropriate referrals for health education or preventive services given if needed, as noted in patient's After Visit Summary  History of Present Illness:     Patient presents for Welcome to Medicare visit  Patient Care Team:  Jane Zamorano as PCP - General (Internal Medicine)  Marsh Bosworth, OD Thom Real, MD     Review of Systems:     Review of Systems   Constitutional: Negative  HENT: Negative  Eyes: Negative  Respiratory: Negative  Cardiovascular: Negative  Gastrointestinal: Negative  Endocrine: Negative  Genitourinary: Negative  Musculoskeletal: Negative  Skin: Negative  Allergic/Immunologic: Negative  Neurological: Negative  Hematological: Negative  Psychiatric/Behavioral: Negative         Problem List:     Patient Active Problem List   Diagnosis    Osteoarthrosis    Familial hypercholesterolemia    Foot pain, bilateral    Essential hypertension    Primary insomnia    Actinic keratoses    Allergic rhinitis    Awakens from sleep at night    Other dyschromia    Disease of sebaceous glands    Skin rash    Subclinical hypothyroidism    Trigger finger    Toenail fungus    Bunion of great toe of right foot    Right hip pain    Varicose veins of both legs with edema      Past Medical and Surgical History:     Past Medical History:   Diagnosis Date    Arthritis     Bursitis     bilateral hips     Greater trochanteric bursitis of right hip 6/4/2015    Right greater trochanteri bursitis causing symptoms of pain and discomfort and altering gait    Hypertension     Post-menopausal     RLL pneumonia     Last Assessed:11/4/14     Past Surgical History:   Procedure Laterality Date    BLADDER SUSPENSION      EYE SURGERY      Retinal Detachment complex      Family History:     Family History   Problem Relation Age of Onset    Heart disease Mother         Coronary disease    Colon cancer Mother 67    Diabetes Mother     Heart disease Father         Coronary disease    Diabetes Father     Colon cancer Family     Coronary artery disease Family     Diabetes type II Family     No Known Problems Sister     No Known Problems Daughter     No Known Problems Maternal Grandmother     No Known Problems Maternal Grandfather     No Known Problems Paternal Grandmother     No Known Problems Son       Social History:        Social History Socioeconomic History    Marital status: /Civil Union     Spouse name: None    Number of children: None    Years of education: None    Highest education level: None   Occupational History    None   Social Needs    Financial resource strain: None    Food insecurity:     Worry: None     Inability: None    Transportation needs:     Medical: None     Non-medical: None   Tobacco Use    Smoking status: Never Smoker    Smokeless tobacco: Never Used   Substance and Sexual Activity    Alcohol use: Yes     Comment: socially    Drug use: No    Sexual activity: None   Lifestyle    Physical activity:     Days per week: None     Minutes per session: None    Stress: None   Relationships    Social connections:     Talks on phone: None     Gets together: None     Attends [employer] service: None     Active member of club or organization: None     Attends meetings of clubs or organizations: None     Relationship status: None    Intimate partner violence:     Fear of current or ex partner: None     Emotionally abused: None     Physically abused: None     Forced sexual activity: None   Other Topics Concern    None   Social History Narrative        Retired-Teacher      Medications and Allergies:     Current Outpatient Medications   Medication Sig Dispense Refill    amLODIPine-benazepril (LOTREL) 10-40 MG per capsule TAKE 1 CAPSULE BY MOUTH  DAILY 90 capsule 1    Calcium Carbonate-Vitamin D 600-200 MG-UNIT CAPS Take 1 capsule by mouth daily      cholecalciferol (VITAMIN D3) 1,000 units tablet Take 1,000 Units by mouth daily      cyanocobalamin (VITAMIN B-12) 500 mcg tablet Take 500 mcg by mouth daily      diclofenac sodium (VOLTAREN) 1 % Apply 2 g topically 4 (four) times a day 3 Tube 1    diclofenac sodium (VOLTAREN) 50 mg EC tablet Take 1 tablet (50 mg total) by mouth 2 (two) times a day Take with food 180 tablet 2    Melatonin 5 MG CHEW Chew 1 tablet as needed      Omega-3 Fatty Acids (FISH OIL) 1200 MG CAPS Take 1 capsule by mouth daily      zolpidem (AMBIEN) 10 mg tablet TAKE 1 TABLET BY MOUTH  EVERY NIGHT AT BEDTIME AS  NEEDED FOR INSOMNIA (Patient not taking: Reported on 7/15/2020) 90 tablet 0     No current facility-administered medications for this visit  No Known Allergies   Immunizations:     Immunization History   Administered Date(s) Administered    INFLUENZA 11/09/2015, 11/16/2016, 10/10/2017    Influenza Split High Dose Preservative Free IM 10/21/2013, 01/08/2015, 11/09/2015, 11/16/2016, 10/10/2017    Influenza TIV (IM) 09/26/2012    Influenza, high dose seasonal 0 5 mL 09/25/2018, 10/24/2019    Pneumococcal Conjugate 13-Valent 01/08/2015    Pneumococcal Polysaccharide PPV23 11/14/2013    Tdap 11/09/2015      Health Maintenance:         Topic Date Due    DXA SCAN  02/27/2021    MAMMOGRAM  06/26/2022    CRC Screening: Colonoscopy  03/11/2029    Hepatitis C Screening  Completed         Topic Date Due    Influenza Vaccine  07/01/2020      Medicare Screening Tests and Risk Assessments:     Gallo Duran is here for her Subsequent Wellness visit  Last Medicare Wellness visit information reviewed, patient interviewed and updates made to the record today  Health Risk Assessment:   Patient rates overall health as very good  Patient feels that their physical health rating is same  Eyesight was rated as slightly worse  Hearing was rated as same  Patient feels that their emotional and mental health rating is same  Pain experienced in the last 7 days has been some  Patient's pain rating has been 6/10  Patient states that she has experienced no weight loss or gain in last 6 months  Pain R/T BL bunions and bruising of the Right foot from moving furniture  Depression Screening:   PHQ-2 Score: 0      Fall Risk Screening:    In the past year, patient has experienced: history of falling in past year    Number of falls: 2 or more  Injured during fall?: No    Feels unsteady when standing or walking?: Yes    Worried about falling?: Yes      Urinary Incontinence Screening:   Patient has not leaked urine accidently in the last six months  Home Safety:  Patient does not have trouble with stairs inside or outside of their home  Patient has working smoke alarms and has working carbon monoxide detector  Home safety hazards include: none  Nutrition:   Current diet is Regular and No Added Salt  Medications:   Patient is currently taking over-the-counter supplements  OTC medications include: see medication list  Patient is able to manage medications  Activities of Daily Living (ADLs)/Instrumental Activities of Daily Living (IADLs):   Walk and transfer into and out of bed and chair?: Yes  Dress and groom yourself?: Yes    Bathe or shower yourself?: Yes    Feed yourself? Yes  Do your laundry/housekeeping?: Yes  Manage your money, pay your bills and track your expenses?: Yes  Make your own meals?: Yes    Do your own shopping?: Yes    Previous Hospitalizations:   Any hospitalizations or ED visits within the last 12 months?: No      Advance Care Planning:   Living will: Yes    Durable POA for healthcare:  Yes    Advanced directive: Yes    Advanced directive counseling given: Yes    Five wishes given: Yes    Patient declined ACP directive: No    End of Life Decisions reviewed with patient: Yes    Provider agrees with end of life decisions: Yes      Cognitive Screening:   Provider or family/friend/caregiver concerned regarding cognition?: No    PREVENTIVE SCREENINGS      Cardiovascular Screening:    General: Screening Not Indicated and History Lipid Disorder      Diabetes Screening:     General: Screening Current      Colorectal Cancer Screening:     General: Screening Current      Breast Cancer Screening:     General: Screening Current      Cervical Cancer Screening:    General: Screening Not Indicated      Osteoporosis Screening:    General: Screening Current      Abdominal Aortic Aneurysm (AAA) Screening:        General: Screening Not Indicated      Lung Cancer Screening:     General: Screening Not Indicated      Hepatitis C Screening:    General: Screening Current    Other Counseling Topics:   Alcohol use counseling, car/seat belt/driving safety, skin self-exam, sunscreen and regular weightbearing exercise and calcium and vitamin D intake  No exam data present     Physical Exam:     /72 (BP Location: Left arm, Patient Position: Sitting, Cuff Size: Large)   Pulse 74   Temp 99 3 °F (37 4 °C) (Tympanic)   Ht 5' 5" (1 651 m)   Wt 72 7 kg (160 lb 3 2 oz)   SpO2 97%   BMI 26 66 kg/m²     Physical Exam   Constitutional: She is oriented to person, place, and time  She appears well-developed and well-nourished  She is cooperative  HENT:   Head: Normocephalic and atraumatic  Right Ear: Tympanic membrane, external ear and ear canal normal    Left Ear: Tympanic membrane, external ear and ear canal normal    Nose: Nose normal    Mouth/Throat: Uvula is midline, oropharynx is clear and moist and mucous membranes are normal    Eyes: Pupils are equal, round, and reactive to light  Conjunctivae, EOM and lids are normal    Neck: Trachea normal, normal range of motion, full passive range of motion without pain and phonation normal  Neck supple  No JVD present  No thyroid mass and no thyromegaly present  Cardiovascular: Normal rate, regular rhythm, S1 normal, S2 normal, normal heart sounds, intact distal pulses and normal pulses  Exam reveals no gallop and no friction rub  No murmur heard  Pulmonary/Chest: Effort normal and breath sounds normal  She has no decreased breath sounds  Abdominal: Soft  Normal appearance and bowel sounds are normal  There is no hepatosplenomegaly  There is no tenderness  No hernia  Genitourinary:   Genitourinary Comments: Deferred    Musculoskeletal: Normal range of motion  Neurological: She is alert and oriented to person, place, and time  She has normal reflexes   No cranial nerve deficit  Skin: Skin is warm, dry and intact  Capillary refill takes less than 2 seconds  Psychiatric: She has a normal mood and affect  Her speech is normal and behavior is normal  Judgment and thought content normal  Cognition and memory are normal    Nursing note and vitals reviewed      Appointment on 06/18/2020   Component Date Value Ref Range Status    WBC 06/18/2020 7 04  4 31 - 10 16 Thousand/uL Final    RBC 06/18/2020 4 10  3 81 - 5 12 Million/uL Final    Hemoglobin 06/18/2020 12 8  11 5 - 15 4 g/dL Final    Hematocrit 06/18/2020 41 0  34 8 - 46 1 % Final    MCV 06/18/2020 100* 82 - 98 fL Final    MCH 06/18/2020 31 2  26 8 - 34 3 pg Final    MCHC 06/18/2020 31 2* 31 4 - 37 4 g/dL Final    RDW 06/18/2020 14 0  11 6 - 15 1 % Final    MPV 06/18/2020 10 6  8 9 - 12 7 fL Final    Platelets 98/24/9622 296  149 - 390 Thousands/uL Final    nRBC 06/18/2020 0  /100 WBCs Final    Neutrophils Relative 06/18/2020 37* 43 - 75 % Final    Immat GRANS % 06/18/2020 0  0 - 2 % Final    Lymphocytes Relative 06/18/2020 48* 14 - 44 % Final    Monocytes Relative 06/18/2020 10  4 - 12 % Final    Eosinophils Relative 06/18/2020 4  0 - 6 % Final    Basophils Relative 06/18/2020 1  0 - 1 % Final    Neutrophils Absolute 06/18/2020 2 63  1 85 - 7 62 Thousands/µL Final    Immature Grans Absolute 06/18/2020 0 01  0 00 - 0 20 Thousand/uL Final    Lymphocytes Absolute 06/18/2020 3 36  0 60 - 4 47 Thousands/µL Final    Monocytes Absolute 06/18/2020 0 68  0 17 - 1 22 Thousand/µL Final    Eosinophils Absolute 06/18/2020 0 31  0 00 - 0 61 Thousand/µL Final    Basophils Absolute 06/18/2020 0 05  0 00 - 0 10 Thousands/µL Final    Sodium 06/18/2020 139  136 - 145 mmol/L Final    Potassium 06/18/2020 4 1  3 5 - 5 3 mmol/L Final    Chloride 06/18/2020 109* 100 - 108 mmol/L Final    CO2 06/18/2020 27  21 - 32 mmol/L Final    ANION GAP 06/18/2020 3* 4 - 13 mmol/L Final    BUN 06/18/2020 25  5 - 25 mg/dL Final    Creatinine 06/18/2020 1 03  0 60 - 1 30 mg/dL Final    Standardized to IDMS reference method    Glucose, Fasting 06/18/2020 90  65 - 99 mg/dL Final      Specimen collection should occur prior to Sulfasalazine administration due to the potential for falsely depressed results  Specimen collection should occur prior to Sulfapyridine administration due to the potential for falsely elevated results   Calcium 06/18/2020 10 0  8 3 - 10 1 mg/dL Final    AST 06/18/2020 15  5 - 45 U/L Final      Specimen collection should occur prior to Sulfasalazine administration due to the potential for falsely depressed results   ALT 06/18/2020 24  12 - 78 U/L Final      Specimen collection should occur prior to Sulfasalazine and/or Sulfapyridine administration due to the potential for falsely depressed results   Alkaline Phosphatase 06/18/2020 62  46 - 116 U/L Final    Total Protein 06/18/2020 7 5  6 4 - 8 2 g/dL Final    Albumin 06/18/2020 3 9  3 5 - 5 0 g/dL Final    Total Bilirubin 06/18/2020 0 56  0 20 - 1 00 mg/dL Final      Use of this assay is not recommended for patients undergoing treatment with eltrombopag due to the potential for falsely elevated results   eGFR 06/18/2020 55  ml/min/1 73sq m Final    Cholesterol 06/18/2020 242* 50 - 200 mg/dL Final      Cholesterol:       Desirable         <200 mg/dl       Borderline         200-239 mg/dl       High              >239           Triglycerides 06/18/2020 133  <=150 mg/dL Final      Triglyceride:     Normal          <150 mg/dl     Borderline High 150-199 mg/dl     High            200-499 mg/dl        Very High       >499 mg/dl    Specimen collection should occur prior to N-Acetylcysteine or Metamizole administration due to the potential for falsely depressed results      HDL, Direct 06/18/2020 54  >=40 mg/dL Final      HDL Cholesterol:       Low     <41 mg/dL  Specimen collection should occur prior to Metamizole administration due to the potential for maura depressed results   LDL Calculated 06/18/2020 161* 0 - 100 mg/dL Final      LDL Cholesterol:     Optimal           <100 mg/dl     Near Optimal      100-129 mg/dl     Above Optimal       Borderline High 130-159 mg/dl       High            160-189 mg/dl       Very High       >189 mg/dl         This screening LDL is a calculated result  It does not have the accuracy of the Direct Measured LDL in the monitoring of patients with hyperlipidemia and/or statin therapy  Direct Measure LDL (PFP711) must be ordered separately in these patients   Non-HDL-Chol (CHOL-HDL) 06/18/2020 188  mg/dl Final    [facility] 3 460  0 358 - 3 740 uIU/mL Final      The recommended reference ranges for TSH during pregnancy are as follows:   First trimester 0 1 to 2 5 uIU/mL   Second trimester  0 2 to 3 0 uIU/mL   Third trimester 0 3 to 3 0 uIU/m    Note: Normal ranges may not apply to patients who are transgender, non-binary, or whose legal sex, sex at birth, and gender identity differ  Using supplements with high doses of biotin 20 to more than 300 times greater than the adequate daily intake for adults of 30 mcg/day as established by the [facility], can cause falsely depress results      Vit D, 25-Hydroxy 06/18/2020 55 5  30 0 - 100 0 ng/mL Final       ANDRADE Christianson

## 2020-07-15 NOTE — PATIENT INSTRUCTIONS
Medicare Preventive Visit Patient Instructions  Thank you for completing your Welcome to Medicare Visit or Medicare Annual Wellness Visit today  Your next wellness visit will be due in one year (7/15/2021)  The screening/preventive services that you may require over the next 5-10 years are detailed below  Some tests may not apply to you based off risk factors and/or age  Screening tests ordered at today's visit but not completed yet may show as past due  Also, please note that scanned in results may not display below  Preventive Screenings:  Service Recommendations Previous Testing/Comments   Colorectal Cancer Screening  * Colonoscopy    * Fecal Occult Blood Test (FOBT)/Fecal Immunochemical Test (FIT)  * Fecal DNA/Cologuard Test  * Flexible Sigmoidoscopy Age: 54-65 years old   Colonoscopy: every 10 years (may be performed more frequently if at higher risk)  OR  FOBT/FIT: every 1 year  OR  Cologuard: every 3 years  OR  Sigmoidoscopy: every 5 years  Screening may be recommended earlier than age 48 if at higher risk for colorectal cancer  Also, an individualized decision between you and your healthcare provider will decide whether screening between the ages of 74-80 would be appropriate  Colonoscopy: 03/11/2019  FOBT/FIT: Not on file  Cologuard: Not on file  Sigmoidoscopy: Not on file    Screening Current     Breast Cancer Screening Age: 36 years old  Frequency: every 1-2 years  Not required if history of left and right mastectomy Mammogram: 06/26/2020    Screening Current   Cervical Cancer Screening Between the ages of 21-29, pap smear recommended once every 3 years  Between the ages of 33-67, can perform pap smear with HPV co-testing every 5 years     Recommendations may differ for women with a history of total hysterectomy, cervical cancer, or abnormal pap smears in past  Pap Smear: 04/17/2019    Screening Not Indicated   Hepatitis C Screening Once for adults born between 1945 and 1965  More frequently in patients at high risk for Hepatitis C Hep C Antibody: 01/03/2017    Screening Current   Diabetes Screening 1-2 times per year if you're at risk for diabetes or have pre-diabetes Fasting glucose: 90 mg/dL   A1C: No results in last 5 years    Screening Current   Cholesterol Screening Once every 5 years if you don't have a lipid disorder  May order more often based on risk factors  Lipid panel: 06/18/2020    Screening Not Indicated  History Lipid Disorder     Other Preventive Screenings Covered by Medicare:  1  Abdominal Aortic Aneurysm (AAA) Screening: covered once if your at risk  You're considered to be at risk if you have a family history of AAA  2  Lung Cancer Screening: covers low dose CT scan once per year if you meet all of the following conditions: (1) Age 50-69; (2) No signs or symptoms of lung cancer; (3) Current smoker or have quit smoking within the last 15 years; (4) You have a tobacco smoking history of at least 30 pack years (packs per day multiplied by number of years you smoked); (5) You get a written order from a healthcare provider  3  Glaucoma Screening: covered annually if you're considered high risk: (1) You have diabetes OR (2) Family history of glaucoma OR (3)  aged 48 and older OR (3)  American aged 72 and older  3  Osteoporosis Screening: covered every 2 years if you meet one of the following conditions: (1) You're estrogen deficient and at risk for osteoporosis based off medical history and other findings; (2) Have a vertebral abnormality; (3) On glucocorticoid therapy for more than 3 months; (4) Have primary hyperparathyroidism; (5) On osteoporosis medications and need to assess response to drug therapy  · Last bone density test (DXA Scan): 02/27/2019   5  HIV Screening: covered annually if you're between the age of 15-65  Also covered annually if you are younger than 13 and older than 72 with risk factors for HIV infection   For pregnant patients, it is covered up to 3 times per pregnancy  Immunizations:  Immunization Recommendations   Influenza Vaccine Annual influenza vaccination during flu season is recommended for all persons aged >= 6 months who do not have contraindications   Pneumococcal Vaccine (Prevnar and Pneumovax)  * Prevnar = PCV13  * Pneumovax = PPSV23   Adults 25-60 years old: 1-3 doses may be recommended based on certain risk factors  Adults 72 years old: Prevnar (PCV13) vaccine recommended followed by Pneumovax (PPSV23) vaccine  If already received PPSV23 since turning 65, then PCV13 recommended at least one year after PPSV23 dose  Hepatitis B Vaccine 3 dose series if at intermediate or high risk (ex: diabetes, end stage renal disease, liver disease)   Tetanus (Td) Vaccine - COST NOT COVERED BY MEDICARE PART B Following completion of primary series, a booster dose should be given every 10 years to maintain immunity against tetanus  Td may also be given as tetanus wound prophylaxis  Tdap Vaccine - COST NOT COVERED BY MEDICARE PART B Recommended at least once for all adults  For pregnant patients, recommended with each pregnancy  Shingles Vaccine (Shingrix) - COST NOT COVERED BY MEDICARE PART B  2 shot series recommended in those aged 48 and above     Health Maintenance Due:      Topic Date Due    DXA SCAN  02/27/2021    MAMMOGRAM  06/26/2022    CRC Screening: Colonoscopy  03/11/2029    Hepatitis C Screening  Completed     Immunizations Due:      Topic Date Due    Influenza Vaccine  07/01/2020     Advance Directives   What are advance directives? Advance directives are legal documents that state your wishes and plans for medical care  These plans are made ahead of time in case you lose your ability to make decisions for yourself  Advance directives can apply to any medical decision, such as the treatments you want, and if you want to donate organs  What are the types of advance directives?   There are many types of advance directives, and each state has rules about how to use them  You may choose a combination of any of the following:  · Living will: This is a written record of the treatment you want  You can also choose which treatments you do not want, which to limit, and which to stop at a certain time  This includes surgery, medicine, IV fluid, and tube feedings  · Durable power of  for healthcare [facility]): This is a written record that states who you want to make healthcare choices for you when you are unable to make them for yourself  This person, called a proxy, is usually a family member or a friend  You may choose more than 1 proxy  · Do not resuscitate (DNR) order:  A DNR order is used in case your heart stops beating or you stop breathing  It is a request not to have certain forms of treatment, such as CPR  A DNR order may be included in other types of advance directives  · Medical directive: This covers the care that you want if you are in a coma, near death, or unable to make decisions for yourself  You can list the treatments you want for each condition  Treatment may include pain medicine, surgery, blood transfusions, dialysis, IV or tube feedings, and a ventilator (breathing machine)  · Values history: This document has questions about your views, beliefs, and how you feel and think about life  This information can help others choose the care that you would choose  Why are advance directives important? An advance directive helps you control your care  Although spoken wishes may be used, it is better to have your wishes written down  Spoken wishes can be misunderstood, or not followed  Treatments may be given even if you do not want them  An advance directive may make it easier for your family to make difficult choices about your care  Fall Prevention    Fall prevention  includes ways to make your home and other areas safer  It also includes ways you can move more carefully to prevent a fall   Health conditions that cause changes in your blood pressure, vision, or muscle strength and coordination may increase your risk for falls  Medicines may also increase your risk for falls if they make you dizzy, weak, or sleepy  Fall prevention tips:   · Stand or sit up slowly  · Use assistive devices as directed  · Wear shoes that fit well and have soles that   · Wear a personal alarm  · Stay active  · Manage your medical conditions  Home Safety Tips:  · Add items to prevent falls in the bathroom  · Keep paths clear  · Install bright lights in your home  · Keep items you use often on shelves within reach  · Paint or place reflective tape on the edges of your stairs  Weight Management   Why it is important to manage your weight:  Being overweight increases your risk of health conditions such as heart disease, high blood pressure, type 2 diabetes, and certain types of cancer  It can also increase your risk for osteoarthritis, sleep apnea, and other respiratory problems  Aim for a slow, steady weight loss  Even a small amount of weight loss can lower your risk of health problems  How to lose weight safely:  A safe and healthy way to lose weight is to eat fewer calories and get regular exercise  You can lose up about 1 pound a week by decreasing the number of calories you eat by 500 calories each day  Healthy meal plan for weight management:  A healthy meal plan includes a variety of foods, contains fewer calories, and helps you stay healthy  A healthy meal plan includes the following:  · Eat whole-grain foods more often  A healthy meal plan should contain fiber  Fiber is the part of grains, fruits, and vegetables that is not broken down by your body  Whole-grain foods are healthy and provide extra fiber in your diet  Some examples of whole-grain foods are whole-wheat breads and pastas, oatmeal, brown rice, and bulgur  · Eat a variety of vegetables every day    Include dark, leafy greens such as spinach, kale, mundo greens, and mustard greens  Eat yellow and orange vegetables such as carrots, sweet potatoes, and winter squash  · Eat a variety of fruits every day  Choose fresh or canned fruit (canned in its own juice or light syrup) instead of juice  Fruit juice has very little or no fiber  · Eat low-fat dairy foods  Drink fat-free (skim) milk or 1% milk  Eat fat-free yogurt and low-fat cottage cheese  Try low-fat cheeses such as mozzarella and other reduced-fat cheeses  · Choose meat and other protein foods that are low in fat  Choose beans or other legumes such as split peas or lentils  Choose fish, skinless poultry (chicken or turkey), or lean cuts of red meat (beef or pork)  Before you cook meat or poultry, cut off any visible fat  · Use less fat and oil  Try baking foods instead of frying them  Add less fat, such as margarine, sour cream, regular salad dressing and mayonnaise to foods  Eat fewer high-fat foods  Some examples of high-fat foods include french fries, doughnuts, ice cream, and cakes  · Eat fewer sweets  Limit foods and drinks that are high in sugar  This includes candy, cookies, regular soda, and sweetened drinks  Exercise:  Exercise at least 30 minutes per day on most days of the week  Some examples of exercise include walking, biking, dancing, and swimming  You can also fit in more physical activity by taking the stairs instead of the elevator or parking farther away from stores  Ask your healthcare provider about the best exercise plan for you  © Copyright [employer] 2018 Information is for End User's use only and may not be sold, redistributed or otherwise used for commercial purposes   All illustrations and images included in CareNotes® are the copyrighted property of [employer]  or [address] [place] [address] [facility]

## 2020-09-04 DIAGNOSIS — M70.61 GREATER TROCHANTERIC BURSITIS OF RIGHT HIP: ICD-10-CM

## 2020-09-09 ENCOUNTER — APPOINTMENT (OUTPATIENT)
Dept: LAB | Facility: CLINIC | Age: 70
End: 2020-09-09
Payer: MEDICARE

## 2020-09-09 ENCOUNTER — TRANSCRIBE ORDERS (OUTPATIENT)
Dept: LAB | Facility: CLINIC | Age: 70
End: 2020-09-09

## 2020-09-09 DIAGNOSIS — Z01.818 PREOPERATIVE CLEARANCE: ICD-10-CM

## 2020-09-09 DIAGNOSIS — Z01.818 PREOPERATIVE CLEARANCE: Primary | ICD-10-CM

## 2020-09-09 LAB
ERYTHROCYTE [DISTWIDTH] IN BLOOD BY AUTOMATED COUNT: 13.5 % (ref 11.6–15.1)
HCT VFR BLD AUTO: 41.8 % (ref 34.8–46.1)
HGB BLD-MCNC: 13.2 G/DL (ref 11.5–15.4)
MCH RBC QN AUTO: 31.4 PG (ref 26.8–34.3)
MCHC RBC AUTO-ENTMCNC: 31.6 G/DL (ref 31.4–37.4)
MCV RBC AUTO: 100 FL (ref 82–98)
PLATELET # BLD AUTO: 325 THOUSANDS/UL (ref 149–390)
PMV BLD AUTO: 10.4 FL (ref 8.9–12.7)
RBC # BLD AUTO: 4.2 MILLION/UL (ref 3.81–5.12)
WBC # BLD AUTO: 8.5 THOUSAND/UL (ref 4.31–10.16)

## 2020-09-09 PROCEDURE — 36415 COLL VENOUS BLD VENIPUNCTURE: CPT

## 2020-09-09 PROCEDURE — 85027 COMPLETE CBC AUTOMATED: CPT

## 2020-09-21 ENCOUNTER — CONSULT (OUTPATIENT)
Dept: FAMILY MEDICINE CLINIC | Facility: CLINIC | Age: 70
End: 2020-09-21
Payer: MEDICARE

## 2020-09-21 VITALS
WEIGHT: 164 LBS | OXYGEN SATURATION: 98 % | SYSTOLIC BLOOD PRESSURE: 120 MMHG | TEMPERATURE: 98.2 F | HEART RATE: 95 BPM | HEIGHT: 65 IN | DIASTOLIC BLOOD PRESSURE: 78 MMHG | BODY MASS INDEX: 27.32 KG/M2

## 2020-09-21 DIAGNOSIS — I10 ESSENTIAL HYPERTENSION: ICD-10-CM

## 2020-09-21 DIAGNOSIS — Z01.818 PRE-OP EXAMINATION: Primary | ICD-10-CM

## 2020-09-21 DIAGNOSIS — M79.671 FOOT PAIN, BILATERAL: ICD-10-CM

## 2020-09-21 DIAGNOSIS — L98.9 SKIN LESIONS: ICD-10-CM

## 2020-09-21 DIAGNOSIS — M79.672 FOOT PAIN, BILATERAL: ICD-10-CM

## 2020-09-21 DIAGNOSIS — Z23 NEED FOR INFLUENZA VACCINATION: ICD-10-CM

## 2020-09-21 PROCEDURE — 99214 OFFICE O/P EST MOD 30 MIN: CPT | Performed by: NURSE PRACTITIONER

## 2020-09-21 PROCEDURE — 90662 IIV NO PRSV INCREASED AG IM: CPT

## 2020-09-21 PROCEDURE — G0008 ADMIN INFLUENZA VIRUS VAC: HCPCS

## 2020-09-21 PROCEDURE — 93000 ELECTROCARDIOGRAM COMPLETE: CPT | Performed by: NURSE PRACTITIONER

## 2020-09-21 RX ORDER — ROSUVASTATIN CALCIUM 10 MG/1
10 TABLET, COATED ORAL DAILY
COMMUNITY
End: 2021-01-25 | Stop reason: SDUPTHER

## 2020-09-21 NOTE — PROGRESS NOTES
Subjective:     Jami Richardson is a 65 y o  female who presents to the office today for a preoperative consultation at the request of surgeon at [address] [place] [address]  who plans on performing bunionectomy of right foot on September 29  This consultation is requested for the specific conditions prompting preoperative evaluation (i e  because of potential affect on operative risk): infection, bleeding, altered ambulation  Planned anesthesia: general and IV sedation  The patient has the following known anesthesia issues: past endotracheal intubation with complications (none)  Patients bleeding risk: use of Ca-channel blockers (see med list)  Patient does not have objections to receiving blood products if needed  The following portions of the patient's history were reviewed and updated as appropriate:   She has a past medical history of Arthritis, Bursitis, Greater trochanteric bursitis of right hip (6/4/2015), Hypertension, Post-menopausal, and RLL pneumonia ,  does not have any pertinent problems on file  ,   has a past surgical history that includes Bladder suspension and Eye surgery  ,  family history includes Colon cancer in her family; Colon cancer (age of onset: 67) in her mother; Coronary artery disease in her family; Diabetes in her father and mother; Diabetes type II in her family; Heart disease in her father and mother; No Known Problems in her daughter, maternal grandfather, maternal grandmother, paternal grandmother, sister, and son ,   reports that she has never smoked  She has never used smokeless tobacco  She reports current alcohol use  She reports that she does not use drugs  ,  has No Known Allergies     Current Outpatient Medications   Medication Sig Dispense Refill    amLODIPine-benazepril (LOTREL) 10-40 MG per capsule TAKE 1 CAPSULE BY MOUTH  DAILY 90 capsule 1    Calcium Carbonate-Vitamin D 600-200 MG-UNIT CAPS Take 1 capsule by mouth daily      cholecalciferol (VITAMIN D3) 1,000 units tablet Take 1,000 Units by mouth daily      cyanocobalamin (VITAMIN B-12) 500 mcg tablet Take 500 mcg by mouth daily      diclofenac sodium (VOLTAREN) 1 % APPLY 2 GRAMS TOPICALLY 4  (FOUR) TIMES A  g 2    diclofenac sodium (VOLTAREN) 50 mg EC tablet Take 1 tablet (50 mg total) by mouth 2 (two) times a day Take with food 180 tablet 2    Melatonin 5 MG CHEW Chew 1 tablet as needed      Omega-3 Fatty Acids (FISH OIL) 1200 MG CAPS Take 1 capsule by mouth daily      rosuvastatin (CRESTOR) 10 MG tablet Take 10 mg by mouth daily      zolpidem (AMBIEN) 10 mg tablet TAKE 1 TABLET BY MOUTH  EVERY NIGHT AT BEDTIME AS  NEEDED FOR INSOMNIA (Patient not taking: Reported on 7/15/2020) 90 tablet 0     No current facility-administered medications for this visit  Review of Systems  Review of Systems   Constitutional: Negative  HENT: Negative  Eyes: Negative  Respiratory: Negative  Cardiovascular: Negative  Gastrointestinal: Negative  Endocrine: Negative  Genitourinary: Negative  Musculoskeletal: Negative  Skin: Negative  Allergic/Immunologic: Negative  Neurological: Negative  Hematological: Negative  Psychiatric/Behavioral: Negative  Vitals:    09/21/20 1457   BP: 120/78   Pulse: 95   Temp: 98 2 °F (36 8 °C)   SpO2: 98%     Body mass index is 27 29 kg/m²  Objective:    Physical Exam  Vitals signs and nursing note reviewed  Constitutional:       Appearance: Normal appearance  She is well-developed  HENT:      Head: Normocephalic and atraumatic  Right Ear: Tympanic membrane, ear canal and external ear normal       Left Ear: Tympanic membrane, ear canal and external ear normal       Nose: Nose normal       Mouth/Throat:      Mouth: Mucous membranes are moist    Eyes:      General: Lids are normal  Vision grossly intact  Conjunctiva/sclera: Conjunctivae normal       Pupils: Pupils are equal, round, and reactive to light     Neck:      Musculoskeletal: Normal range of motion and neck supple  Cardiovascular:      Rate and Rhythm: Normal rate and regular rhythm  Pulses: Normal pulses  Heart sounds: Normal heart sounds, S1 normal and S2 normal  No friction rub  No gallop  No S3 sounds  Pulmonary:      Effort: Pulmonary effort is normal       Breath sounds: Normal breath sounds  Abdominal:      General: Bowel sounds are normal       Palpations: Abdomen is soft  Tenderness: There is no abdominal tenderness  Genitourinary:     Comments: Deferred  Musculoskeletal: Normal range of motion  Right lower leg: No edema  Left lower leg: No edema  Lymphadenopathy:      Cervical: No cervical adenopathy  Skin:     General: Skin is warm and dry  Capillary Refill: Capillary refill takes less than 2 seconds  Neurological:      General: No focal deficit present  Mental Status: She is alert and oriented to person, place, and time  Motor: Motor function is intact  Gait: Gait is intact  Psychiatric:         Attention and Perception: Attention and perception normal          Mood and Affect: Mood and affect normal          Speech: Speech normal          Behavior: Behavior normal  Behavior is cooperative  Thought Content: Thought content normal          Cognition and Memory: Cognition and memory normal          Judgment: Judgment normal            Predictors of intubation difficulty:   Morbid obesity? no   Anatomically abnormal facies? no   Prominent incisors? no   Receding mandible? no   Short, thick neck? no   Neck range of motion: normal   Mallampati score: I (soft palate, uvula, fauces, and tonsillar pillars visible)   Thyromental distance: < 6cm   Mouth openin 5cm   Dentition: No chipped, loose, or missing teeth      Cardiographics  ECG: normal sinus rhythm, no blocks or conduction defects, no ischemic changes  Echocardiogram: not done    Imaging  Chest x-ray: not done     Lab Review   Appointment on 2020 Component Date Value    WBC 09/09/2020 8 50     RBC 09/09/2020 4 20     Hemoglobin 09/09/2020 13 2     Hematocrit 09/09/2020 41 8     MCV 09/09/2020 100*    MCH 09/09/2020 31 4     MCHC 09/09/2020 31 6     RDW 09/09/2020 13 5     Platelets 86/20/1961 325     MPV 09/09/2020 10 4         Assessment:    65 y o  female  with planned surgery as above  Known risk factors for perioperative complications: None    Difficulty with intubation is not anticipated  Cardiac Risk Estimation: low    Current medications which may produce withdrawal symptoms if withheld perioperatively: none     Plan:    1  Preoperative workup as follows ECG, hemoglobin, hematocrit  2  Change in medication regimen before surgery: discontinue ASA 14 days before surgery and discontinue NSAIDs (including OTC) 14 days before surgery  3  Prophylaxis for cardiac events with perioperative beta-blockers: should be considered, specific regimen per anesthesia  4  Invasive hemodynamic monitoring perioperatively: at the discretion of anesthesiologist   5  Deep vein thrombosis prophylaxis postoperatively:regimen to be chosen by surgical team   6  Surveillance for postoperative MI with ECG immediately postoperatively and on postoperative days 1 and 2 AND troponin levels 24 hours postoperatively and on day 4 or hospital discharge (whichever comes first): at the discretion of anesthesiologist   7  Franci Mitchell is medically cleared for surgical procedure

## 2020-09-23 ENCOUNTER — TELEPHONE (OUTPATIENT)
Dept: INTERNAL MEDICINE CLINIC | Facility: CLINIC | Age: 70
End: 2020-09-23

## 2020-09-24 ENCOUNTER — CONSULT (OUTPATIENT)
Dept: INTERNAL MEDICINE CLINIC | Facility: CLINIC | Age: 70
End: 2020-09-24
Payer: MEDICARE

## 2020-09-24 VITALS
DIASTOLIC BLOOD PRESSURE: 80 MMHG | HEIGHT: 65 IN | OXYGEN SATURATION: 97 % | TEMPERATURE: 98.9 F | HEART RATE: 90 BPM | SYSTOLIC BLOOD PRESSURE: 140 MMHG | BODY MASS INDEX: 27.69 KG/M2 | RESPIRATION RATE: 14 BRPM | WEIGHT: 166.2 LBS

## 2020-09-24 DIAGNOSIS — M21.611 BUNION OF GREAT TOE OF RIGHT FOOT: ICD-10-CM

## 2020-09-24 DIAGNOSIS — E53.8 DEFICIENCY OF OTHER SPECIFIED B GROUP VITAMINS: ICD-10-CM

## 2020-09-24 DIAGNOSIS — D75.89 MACROCYTOSIS: Primary | ICD-10-CM

## 2020-09-24 PROCEDURE — 99212 OFFICE O/P EST SF 10 MIN: CPT | Performed by: INTERNAL MEDICINE

## 2020-09-24 NOTE — PROGRESS NOTES
Assessment/Plan:    Problem List Items Addressed This Visit        Musculoskeletal and Integument    Bunion of great toe of right foot      Other Visit Diagnoses     Macrocytosis    -  Primary           Diagnoses and all orders for this visit:    Macrocytosis  -     Vitamin B12; Future  -     Folate; Future    Bunion of great toe of right foot        No problem-specific Assessment & Plan notes found for this encounter  Subjective: here for follow regarding clearance for surgery     Patient ID: Marisela Macias is a 65 y o  female  The patient states that there are no other concerns at this time  She has issues with pain in the right foot at the first MTIP and is getting ready for repair of Halux Valgus Deformity  She has no other complaints at this time  The following portions of the patient's history were reviewed and updated as appropriate:   She has a past medical history of Arthritis, Bursitis, Greater trochanteric bursitis of right hip (6/4/2015), Hypertension, Post-menopausal, and RLL pneumonia ,  does not have any pertinent problems on file  ,   has a past surgical history that includes Bladder suspension and Eye surgery  ,  family history includes Colon cancer in her family; Colon cancer (age of onset: 67) in her mother; Coronary artery disease in her family; Diabetes in her father and mother; Diabetes type II in her family; Heart disease in her father and mother; No Known Problems in her daughter, maternal grandfather, maternal grandmother, paternal grandmother, sister, and son ,   reports that she has never smoked  She has never used smokeless tobacco  She reports current alcohol use  She reports that she does not use drugs  ,  has No Known Allergies     Current Outpatient Medications   Medication Sig Dispense Refill    amLODIPine-benazepril (LOTREL) 10-40 MG per capsule TAKE 1 CAPSULE BY MOUTH  DAILY 90 capsule 1    Calcium Carbonate-Vitamin D 600-200 MG-UNIT CAPS Take 1 capsule by mouth daily      cholecalciferol (VITAMIN D3) 1,000 units tablet Take 1,000 Units by mouth daily      cyanocobalamin (VITAMIN B-12) 500 mcg tablet Take 500 mcg by mouth daily      diclofenac sodium (VOLTAREN) 1 % APPLY 2 GRAMS TOPICALLY 4  (FOUR) TIMES A  g 2    diclofenac sodium (VOLTAREN) 50 mg EC tablet Take 1 tablet (50 mg total) by mouth 2 (two) times a day Take with food 180 tablet 2    Melatonin 5 MG CHEW Chew 1 tablet as needed      Omega-3 Fatty Acids (FISH OIL) 1200 MG CAPS Take 1 capsule by mouth daily      rosuvastatin (CRESTOR) 10 MG tablet Take 10 mg by mouth daily      zolpidem (AMBIEN) 10 mg tablet TAKE 1 TABLET BY MOUTH  EVERY NIGHT AT BEDTIME AS  NEEDED FOR INSOMNIA 90 tablet 0     No current facility-administered medications for this visit  Review of Systems   Constitutional: Negative for chills, fatigue and fever  HENT: Negative  Respiratory: Negative for cough, chest tightness and shortness of breath  Cardiovascular: Negative for chest pain, palpitations and leg swelling  Gastrointestinal: Negative for abdominal pain, constipation, diarrhea, nausea and vomiting  Genitourinary: Negative  Musculoskeletal: Negative for arthralgias, back pain and myalgias  Skin: Negative  Neurological: Negative  Psychiatric/Behavioral: Negative  Objective:  Vitals:    09/24/20 0828   BP: 140/80   BP Location: Left arm   Patient Position: Sitting   Cuff Size: Standard   Pulse: 90   Resp: 14   Temp: 98 9 °F (37 2 °C)   SpO2: 97%   Weight: 75 4 kg (166 lb 3 2 oz)   Height: 5' 5" (1 651 m)     Body mass index is 27 66 kg/m²  Physical Exam  Vitals signs and nursing note reviewed  Constitutional:       Appearance: She is well-developed  HENT:      Head: Normocephalic and atraumatic  Eyes:      Pupils: Pupils are equal, round, and reactive to light  Neck:      Musculoskeletal: Normal range of motion and neck supple     Cardiovascular:      Rate and Rhythm: Normal rate and regular rhythm  Heart sounds: Normal heart sounds  No murmur  Pulmonary:      Effort: Pulmonary effort is normal  No respiratory distress  Breath sounds: Normal breath sounds  No wheezing  Abdominal:      General: Bowel sounds are normal       Palpations: Abdomen is soft  Tenderness: There is no abdominal tenderness  Musculoskeletal: Normal range of motion  Skin:     General: Skin is warm and dry  Neurological:      Mental Status: She is alert and oriented to person, place, and time            PHQ-9 Depression Screening    PHQ-9:    Frequency of the following problems over the past two weeks:

## 2020-09-28 ENCOUNTER — ANESTHESIA EVENT (OUTPATIENT)
Dept: PERIOP | Facility: HOSPITAL | Age: 70
End: 2020-09-28
Payer: MEDICARE

## 2020-09-28 NOTE — PRE-PROCEDURE INSTRUCTIONS
Pre-Surgery Instructions:   Medication Instructions    amLODIPine-benazepril (LOTREL) 10-40 MG per capsule Instructed patient per Anesthesia Guidelines   Calcium Carbonate-Vitamin D 600-200 MG-UNIT CAPS Instructed patient per Anesthesia Guidelines   cholecalciferol (VITAMIN D3) 1,000 units tablet Instructed patient per Anesthesia Guidelines   cyanocobalamin (VITAMIN B-12) 500 mcg tablet Instructed patient per Anesthesia Guidelines   diclofenac sodium (VOLTAREN) 1 % Instructed patient per Anesthesia Guidelines   diclofenac sodium (VOLTAREN) 50 mg EC tablet Instructed patient per Anesthesia Guidelines   Melatonin 5 MG CHEW Instructed patient per Anesthesia Guidelines   rosuvastatin (CRESTOR) 10 MG tablet Instructed patient per Anesthesia Guidelines   zolpidem (AMBIEN) 10 mg tablet Instructed patient per Anesthesia Guidelines  Instructed to take crestor am of surgery with sip ofw ater per anesthesia guidelines

## 2020-09-29 ENCOUNTER — ANESTHESIA (OUTPATIENT)
Dept: PERIOP | Facility: HOSPITAL | Age: 70
End: 2020-09-29
Payer: MEDICARE

## 2020-09-29 ENCOUNTER — HOSPITAL ENCOUNTER (OUTPATIENT)
Facility: HOSPITAL | Age: 70
Setting detail: OUTPATIENT SURGERY
Discharge: HOME | End: 2020-09-29
Attending: PODIATRIST | Admitting: PODIATRIST
Payer: MEDICARE

## 2020-09-29 ENCOUNTER — APPOINTMENT (OUTPATIENT)
Dept: RADIOLOGY | Facility: HOSPITAL | Age: 70
End: 2020-09-29
Payer: MEDICARE

## 2020-09-29 VITALS
OXYGEN SATURATION: 98 % | SYSTOLIC BLOOD PRESSURE: 126 MMHG | TEMPERATURE: 97.5 F | BODY MASS INDEX: 27.66 KG/M2 | HEART RATE: 76 BPM | RESPIRATION RATE: 18 BRPM | DIASTOLIC BLOOD PRESSURE: 81 MMHG | HEIGHT: 65 IN | WEIGHT: 166 LBS

## 2020-09-29 VITALS — HEART RATE: 74 BPM

## 2020-09-29 PROCEDURE — C1713 ANCHOR/SCREW BN/BN,TIS/BN: HCPCS | Performed by: PODIATRIST

## 2020-09-29 PROCEDURE — 73630 X-RAY EXAM OF FOOT: CPT

## 2020-09-29 DEVICE — K-WIRE TROCAR POINT ROUND END L062                                    X 4: Type: IMPLANTABLE DEVICE | Site: TOE | Status: FUNCTIONAL

## 2020-09-29 RX ORDER — MIDAZOLAM HYDROCHLORIDE 2 MG/2ML
INJECTION, SOLUTION INTRAMUSCULAR; INTRAVENOUS AS NEEDED
Status: DISCONTINUED | OUTPATIENT
Start: 2020-09-29 | End: 2020-09-29

## 2020-09-29 RX ORDER — OXYCODONE HYDROCHLORIDE 5 MG/1
5 TABLET ORAL EVERY 4 HOURS PRN
Status: DISCONTINUED | OUTPATIENT
Start: 2020-09-29 | End: 2020-09-29 | Stop reason: HOSPADM

## 2020-09-29 RX ORDER — PROPOFOL 10 MG/ML
INJECTION, EMULSION INTRAVENOUS CONTINUOUS PRN
Status: DISCONTINUED | OUTPATIENT
Start: 2020-09-29 | End: 2020-09-29

## 2020-09-29 RX ORDER — FENTANYL CITRATE/PF 50 MCG/ML
50 SYRINGE (ML) INJECTION
Status: DISCONTINUED | OUTPATIENT
Start: 2020-09-29 | End: 2020-09-29 | Stop reason: HOSPADM

## 2020-09-29 RX ORDER — CEFAZOLIN SODIUM 2 G/50ML
SOLUTION INTRAVENOUS AS NEEDED
Status: DISCONTINUED | OUTPATIENT
Start: 2020-09-29 | End: 2020-09-29

## 2020-09-29 RX ORDER — ACETAMINOPHEN 325 MG/1
650 TABLET ORAL EVERY 6 HOURS PRN
Status: DISCONTINUED | OUTPATIENT
Start: 2020-09-29 | End: 2020-09-29 | Stop reason: HOSPADM

## 2020-09-29 RX ORDER — SODIUM CHLORIDE 9 MG/ML
125 INJECTION, SOLUTION INTRAVENOUS CONTINUOUS
Status: DISCONTINUED | OUTPATIENT
Start: 2020-09-29 | End: 2020-09-29 | Stop reason: HOSPADM

## 2020-09-29 RX ORDER — HYDROMORPHONE HCL/PF 1 MG/ML
0.5 SYRINGE (ML) INJECTION
Status: DISCONTINUED | OUTPATIENT
Start: 2020-09-29 | End: 2020-09-29 | Stop reason: HOSPADM

## 2020-09-29 RX ORDER — DEXAMETHASONE SODIUM PHOSPHATE 4 MG/ML
INJECTION, SOLUTION INTRA-ARTICULAR; INTRALESIONAL; INTRAMUSCULAR; INTRAVENOUS; SOFT TISSUE AS NEEDED
Status: DISCONTINUED | OUTPATIENT
Start: 2020-09-29 | End: 2020-09-29

## 2020-09-29 RX ORDER — BUPIVACAINE HYDROCHLORIDE 5 MG/ML
INJECTION, SOLUTION PERINEURAL AS NEEDED
Status: DISCONTINUED | OUTPATIENT
Start: 2020-09-29 | End: 2020-09-29 | Stop reason: HOSPADM

## 2020-09-29 RX ORDER — MEPERIDINE HYDROCHLORIDE 25 MG/ML
12.5 INJECTION INTRAMUSCULAR; INTRAVENOUS; SUBCUTANEOUS ONCE AS NEEDED
Status: DISCONTINUED | OUTPATIENT
Start: 2020-09-29 | End: 2020-09-29 | Stop reason: HOSPADM

## 2020-09-29 RX ORDER — MAGNESIUM HYDROXIDE 1200 MG/15ML
LIQUID ORAL AS NEEDED
Status: DISCONTINUED | OUTPATIENT
Start: 2020-09-29 | End: 2020-09-29 | Stop reason: HOSPADM

## 2020-09-29 RX ORDER — ONDANSETRON 2 MG/ML
4 INJECTION INTRAMUSCULAR; INTRAVENOUS ONCE AS NEEDED
Status: DISCONTINUED | OUTPATIENT
Start: 2020-09-29 | End: 2020-09-29 | Stop reason: HOSPADM

## 2020-09-29 RX ORDER — FENTANYL CITRATE 50 UG/ML
INJECTION, SOLUTION INTRAMUSCULAR; INTRAVENOUS AS NEEDED
Status: DISCONTINUED | OUTPATIENT
Start: 2020-09-29 | End: 2020-09-29

## 2020-09-29 RX ORDER — HYDROMORPHONE HCL/PF 1 MG/ML
SYRINGE (ML) INJECTION AS NEEDED
Status: DISCONTINUED | OUTPATIENT
Start: 2020-09-29 | End: 2020-09-29

## 2020-09-29 RX ORDER — ONDANSETRON 2 MG/ML
INJECTION INTRAMUSCULAR; INTRAVENOUS AS NEEDED
Status: DISCONTINUED | OUTPATIENT
Start: 2020-09-29 | End: 2020-09-29

## 2020-09-29 RX ADMIN — DEXAMETHASONE SODIUM PHOSPHATE 4 MG: 4 INJECTION, SOLUTION INTRAMUSCULAR; INTRAVENOUS at 10:05

## 2020-09-29 RX ADMIN — SODIUM CHLORIDE 125 ML/HR: 0.9 INJECTION, SOLUTION INTRAVENOUS at 09:55

## 2020-09-29 RX ADMIN — ONDANSETRON 4 MG: 2 INJECTION INTRAMUSCULAR; INTRAVENOUS at 10:05

## 2020-09-29 RX ADMIN — FENTANYL CITRATE 100 MCG: 50 INJECTION, SOLUTION INTRAMUSCULAR; INTRAVENOUS at 10:03

## 2020-09-29 RX ADMIN — SODIUM CHLORIDE: 0.9 INJECTION, SOLUTION INTRAVENOUS at 10:30

## 2020-09-29 RX ADMIN — SODIUM CHLORIDE 125 ML/HR: 0.9 INJECTION, SOLUTION INTRAVENOUS at 11:53

## 2020-09-29 RX ADMIN — PROPOFOL 80 MCG/KG/MIN: 10 INJECTION, EMULSION INTRAVENOUS at 10:04

## 2020-09-29 RX ADMIN — CEFAZOLIN SODIUM 1000 MG: 2 SOLUTION INTRAVENOUS at 09:45

## 2020-09-29 RX ADMIN — HYDROMORPHONE HYDROCHLORIDE 0.5 MG: 1 INJECTION, SOLUTION INTRAMUSCULAR; INTRAVENOUS; SUBCUTANEOUS at 10:50

## 2020-09-29 RX ADMIN — FENTANYL CITRATE 100 MCG: 50 INJECTION, SOLUTION INTRAMUSCULAR; INTRAVENOUS at 10:15

## 2020-09-29 RX ADMIN — MIDAZOLAM 4 MG: 1 INJECTION INTRAMUSCULAR; INTRAVENOUS at 10:01

## 2020-09-29 NOTE — DISCHARGE INSTRUCTIONS
[facility] footcare  Dr Arina Abernathy Instructions    1  Take your prescribed medication as directed  2  Upon arrival at home, lie down and elevate your surgical foot on 2 pillows  3  Remain quiet, off your feet as much as possible, for the first 24-48 hours  This is when your feet first swell and may become painful  After 48 hours you may begin limited walking following these restrictions:   Nonweightbearinbg to surgical foot  4  Drink large quantities of water  Consume no alcohol  Continue a well-balanced diet  5  Report any unusual discomfort or fever to this office  6  A limited amount of discomfort and swelling is to be expected  In some cases the skin may take on a bruised appearance  The surgical solution that was applied to your foot prior to the operation is dark in color and the operation site may appear to be oozing when it actually is not  7  A slight amount of blood is to be expected, and is no cause for alarm  Do not remove the dressings  If there is active bleeding and if the bleeding persists, add additional gauze to the bandage, apply direct pressure, elevate your feet and call this office  8  Do not get the dressings wet  As regular bathing may be inconvenient, sponge baths are recommended  9  When anesthesia wears off and if any discomfort should be present, apply an ice pack directly over the operated area for 15 minute intervals for several hours or until the pain leaves  (USE IN EXCESS OF 15 MINUTES COULD CAUSE FROSTBITE)  Do not use hot water bags or electric pads  A convenient icepack can be made by placing ice cubes in a plastic bag and covering this with a towel  10  If necessary, take a mild laxative before retiring  11  Wear your special open shoes anytime you put weight on your foot, even if it is just to walk to the bathroom and back  It will probably be 2 or 3 weeks before you will be permitted to try regular shoes    12  Having performed the operation, we are interested in a prompt recovery  Please cooperate by following the above instructions  13  Please call to confirm your post-op appointment or call with any other questions

## 2020-09-29 NOTE — ANESTHESIA POSTPROCEDURE EVALUATION
Post-Op Assessment Note    CV Status:  Stable    Pain management: adequate     Mental Status:  Alert and awake   Hydration Status:  Euvolemic   PONV Controlled:  Controlled   Airway Patency:  Patent      Post Op Vitals Reviewed: Yes      Staff: Anesthesiologist         No complications documented      /64 (09/29/20 1159)    Temp 97 5 °F (36 4 °C) (09/29/20 1159)    Pulse 68 (09/29/20 1159)   Resp 12 (09/29/20 1159)    SpO2 98 % (09/29/20 1159)

## 2020-09-29 NOTE — OP NOTE
OPERATIVE REPORT - Podiatry  PATIENT NAME: Stacey Pennington    :  1950  MRN: [id number]  Pt Location: [facility] OR ROOM 04    SURGERY DATE: 2020    Surgeon(s) and Role:     * Abby Hinkle DPM - Primary     * Lorena Harrison DPM - Assisting    Pre-op Diagnosis:  Hallux valgus (acquired), right foot [M20 11]    Post-Op Diagnosis Codes:     * Hallux valgus (acquired), right foot [M20 11]    Procedure(s) (LRB):  BUNIONECTOMY JORGITO (Right)      Specimen(s):  * No specimens in log *    Estimated Blood Loss:   Minimal    Drains:  * No LDAs found *    Implants:  Implant Name Type Inv  Item Serial No   Lot No  LRB No  Used Action   K-WIRE TROCAR ROUND  062 X 4 [id number]  K-WIRE TROCAR ROUND  062 X 4 IN  [facility] [employer] [id number] Right 1 Implanted       Anesthesia Type:   IV Sedation with Anesthesia with 15 ml of 1% Lidocaine and 0 25% Bupivacaine in a 1:1 mixture 10 cc 0 5% marcaine plain    Hemostasis:  Surgical dissection  Pneumatic ankle tourniquet placed for 66 minutes    Materials:  Vicryl and nylon suture 0 062 k wire    Operative Findings:  Medial 1st metatarsal head cartilage was noted to be mildly eroded  Fibrosis of medial capsule noted  Contracture of EHL and EHB tendon noted    Complications:   None    Procedure and Technique:     Under mild sedation, the patient was brought into the operating room and placed on the operating room table in the supine position  A pneumatic tourniquet was then placed around the patient's right lower extremity with ample webril padding  A time out was performed to confirm the correct patient, procedure and site with all parties in agreement  Following IV sedation, a harris block was performed consisting of 15 ml of 1% Lidocaine and 0 25% Bupivacaine in a 1:1 mixture  The foot was then scrubbed, prepped and draped in the usual aseptic manner  An esmarch bandage was utilized to exsangunate the patients foot and the tourniquet was then inflated   The [employer] bandage was removed and the foot was placed on the operating room table  Attention was then directed to the dorsal aspect of the first metatarsal where an approximately 6 cm linear incision was made  The incision was deepened through the subcutaneous tissues using sharp and blunt dissection  Care was taken to identify and retract all vital neural and vascular structures  All bleeders were cauterized and ligated as necessary  A capsuloptomy was performed over the dorsal aspect of the MPJ  The periosteal and capsular structures were then carefully dissected free of their osseous attachments and reflected medially and laterally, thus exposing the head of the first metatarsal at the operative site  Attention was then directed to the 1st interspace via the original skin incision where the dissection was continued deep using sharp dissection down to the level of the fibular sesamoid which was free from its soft tissue attachments proximally, laterally and distally  The conjoined tendon of the adductor halluces was then identified and transected at its attachment  At this time the lateral contraction presents on the hallux was noted to be reduced and the sesamoid apparatus was noted to float into a more corrected medial position  Attention was then directed to the first met head where the medial prominence was resected by the sagittal bone saw  A k-wire was used as a guidewire at the medial aspect of the 1st met head  A through and through V type osteotomy was made at a 60 degree angle  This cut was created in the metataphyseal region of the bone utilizing a sagittal bone saw and the apices of this osteotomy pointing proximal plantarly and proximal dorsally  Upon completion of this osteotomy, the capital fragment was distracted and shifted laterally into a more corrected position and impacted onto the shaft of the first met  K wires were used as temp fixation across the osteotomy site  With proper AO technique a 0 062 k wire from proximal to distal serves as fixation across the osteotomy site  Attention was directed to the remaining medial bone shelf proximal to the osteotomy site which was resected using a sagittal saw and passed from operative field  Correction of the deformity was assessed at this time and noted to be adequate      z-lengthening was next deemed necessary of the EHL  Tenotomy performed of the EHB  EHL was secured with 3-0 vicryl suture  Medial capsule bias was next performed removing kocher thickness of capsule and re-approximated with 3-0 suture     The wound was then flushed with copious amounts of sterile saline  The periosteal and capsular structures were reapproximated using 3-0 Vicryl  The subQ tissues were reapproximated using 4-0 Vicryl and the skin was reapproximated using 5-0 monocryl in a running subcutaneous suture technique  A postoperative injection consisting of 10 ml of 0 5% Bupivacaine was performed  The incision site was dressed with Betadine soaked adaptic, 4x4 gauze, and ABD  This was then covered with a Lis and an ACE wrap  The tourniquet was deflated and normal hyperemic flush was noted to all digits  The patient tolerated the procedure and anesthesia well and was transported to the PACU with vital signs stable  Dr Ortega Sparks was present during the entire procedure and participated in all key aspects  SIGNATURE: Jama Camacho DPM  DATE: September 29, 2020  TIME: 11:25 AM      Portions of the record may have been created with voice recognition software  Occasional wrong word or "sound a like" substitutions may have occurred due to the inherent limitations of voice recognition software  Read the chart carefully and recognize, using context, where substitutions have occurred

## 2020-09-29 NOTE — ANESTHESIA PREPROCEDURE EVALUATION
Procedure:  BUNIONECTOMY JORGITO (Right Foot)    Relevant Problems   ANESTHESIA (within normal limits)      CARDIO   (+) Essential hypertension   (+) Familial hypercholesterolemia      ENDO   (+) Subclinical hypothyroidism      GI/HEPATIC (within normal limits)      /RENAL (within normal limits)      GYN (within normal limits)      HEMATOLOGY (within normal limits)      MUSCULOSKELETAL   (+) Osteoarthrosis      NEURO/PSYCH (within normal limits)      PULMONARY (within normal limits)        Physical Exam    Airway    Mallampati score: II  TM Distance: >3 FB  Neck ROM: full     Dental       Cardiovascular  Rhythm: regular, Rate: normal,     Pulmonary  Breath sounds clear to auscultation,     Other Findings        Anesthesia Plan  ASA Score- 2     Anesthesia Type- IV sedation with anesthesia with ASA Monitors  Additional Monitors:   Airway Plan:           Plan Factors-Exercise tolerance (METS): >4 METS  Chart reviewed  Patient summary reviewed  Patient is not a current smoker  Patient not instructed to abstain from smoking on day of procedure  Patient did not smoke on day of surgery  Induction- intravenous  Postoperative Plan-     Informed Consent- Anesthetic plan and risks discussed with patient

## 2020-09-29 NOTE — DISCHARGE SUMMARY
Discharge Summary Outpatient Procedure Podiatry -   Jasvir Godfrey Sames 65 y o  female MRN: [id number]  Unit/Bed#: OR POOL Encounter: [id number]    Admission Date: 9/29/2020     Admitting Diagnosis: Hallux valgus (acquired), right foot [M20 11]    Discharge Diagnosis: same    Procedures Performed: Briseida Must: 08914 (CPT®)    Complications: none    Condition at Discharge: stable    Discharge instructions/Information to patient and family:   See after visit summary for information provided to patient and family  Provisions for Follow-Up Care/Important appointments:  See after visit summary for information related to follow-up care and any pertinent home health orders  Discharge Medications:  See after visit summary for reconciled discharge medications provided to patient and family

## 2020-10-23 DIAGNOSIS — I10 ESSENTIAL HYPERTENSION: ICD-10-CM

## 2020-10-23 RX ORDER — AMLODIPINE BESYLATE AND BENAZEPRIL HYDROCHLORIDE 10; 40 MG/1; MG/1
1 CAPSULE ORAL DAILY
Qty: 90 CAPSULE | Refills: 1 | Status: SHIPPED | OUTPATIENT
Start: 2020-10-23 | End: 2021-03-31

## 2020-12-23 ENCOUNTER — EVALUATION (OUTPATIENT)
Dept: PHYSICAL THERAPY | Facility: CLINIC | Age: 70
End: 2020-12-23
Payer: MEDICARE

## 2020-12-23 DIAGNOSIS — Z98.890 S/P BUNIONECTOMY: Primary | ICD-10-CM

## 2020-12-24 PROCEDURE — 97162 PT EVAL MOD COMPLEX 30 MIN: CPT | Performed by: PHYSICAL THERAPIST

## 2020-12-28 ENCOUNTER — OFFICE VISIT (OUTPATIENT)
Dept: PHYSICAL THERAPY | Facility: CLINIC | Age: 70
End: 2020-12-28
Payer: MEDICARE

## 2020-12-28 DIAGNOSIS — Z98.890 S/P BUNIONECTOMY: Primary | ICD-10-CM

## 2020-12-28 PROCEDURE — 97140 MANUAL THERAPY 1/> REGIONS: CPT

## 2020-12-28 PROCEDURE — 97112 NEUROMUSCULAR REEDUCATION: CPT

## 2020-12-28 PROCEDURE — 97110 THERAPEUTIC EXERCISES: CPT

## 2020-12-31 ENCOUNTER — OFFICE VISIT (OUTPATIENT)
Dept: PHYSICAL THERAPY | Facility: CLINIC | Age: 70
End: 2020-12-31
Payer: MEDICARE

## 2020-12-31 DIAGNOSIS — Z98.890 S/P BUNIONECTOMY: Primary | ICD-10-CM

## 2020-12-31 PROCEDURE — 97110 THERAPEUTIC EXERCISES: CPT

## 2020-12-31 PROCEDURE — 97140 MANUAL THERAPY 1/> REGIONS: CPT

## 2020-12-31 PROCEDURE — 97112 NEUROMUSCULAR REEDUCATION: CPT

## 2021-01-04 ENCOUNTER — OFFICE VISIT (OUTPATIENT)
Dept: PHYSICAL THERAPY | Facility: CLINIC | Age: 71
End: 2021-01-04
Payer: MEDICARE

## 2021-01-04 DIAGNOSIS — Z98.890 S/P BUNIONECTOMY: Primary | ICD-10-CM

## 2021-01-04 PROCEDURE — 97112 NEUROMUSCULAR REEDUCATION: CPT | Performed by: PHYSICAL THERAPIST

## 2021-01-04 PROCEDURE — 97140 MANUAL THERAPY 1/> REGIONS: CPT | Performed by: PHYSICAL THERAPIST

## 2021-01-04 PROCEDURE — 97110 THERAPEUTIC EXERCISES: CPT | Performed by: PHYSICAL THERAPIST

## 2021-01-04 NOTE — PROGRESS NOTES
Daily Note     Today's date: 2021  Patient name: Ramo Culver  :   MRN: [id number]  Referring provider: Piter Mireles DPM  Dx:   Encounter Diagnosis     ICD-10-CM    1  S/P bunionectomy  Z98 890                   Subjective: " I can notice more improvement since we started stretching "       Objective: See treatment diary below      Assessment: Tolerated treatment well  Patient exhibited good technique with therapeutic exercises and would benefit from continued PT  Great toe extension remains limited leading to gait dysfunction  Plan: Continue per plan of care  Precautions: S/P Right Bunionectomy, DOS        Manuals      PROM to Right Ankle great toe   TM TM RR      x10 min x10 min x10 min                     Neuro Re-Ed        Biodex Targets   LOS  Static, med dif, x2 LOS  Static, med dif, x2 LOS Static, medium difficulty x2     Tandem Stance   Biodex postural control, L12, 30" x2 ea Biodex postural control, L12, 30" x2 ea Biodex, Postural control L12 30''x2 each     SLS Balance   Biodex postural stability, static, 20" x3 RLE Biodex postural stability, static, 20" x3 RLE Biodex Postural stability, Static 20''x3 RLE                                       Ther Ex        NuStep   L3 x10 min L3 x10 min L3 x 10 min     Ankle TB 4 way  Blue TB x20 ea Blue TB x20 ea Blue TB x 20 each     Self Gastroc   W/ towel 10"x10 W/ towel 10"x10 Towel 10''10x    Great Toe Extension  10" x10 10" x10 10''10x    Arch Lifts   5" x20 5" x20 5''20x    Standing three way   x10 ea B/L x10 ea B/L x10 each B/L     Step Up  Step "B" x20 ea Step "B" x20 ea Step B x 20 each     Mini Squat  3" x20  3" x20  3''x20     HR/TR  x20 ea x20 ea x20 each                                     Ther Activity                        Gait Training        Treadmill Walking   NV              Modalities

## 2021-01-07 ENCOUNTER — OFFICE VISIT (OUTPATIENT)
Dept: PHYSICAL THERAPY | Facility: CLINIC | Age: 71
End: 2021-01-07
Payer: MEDICARE

## 2021-01-07 DIAGNOSIS — Z98.890 S/P BUNIONECTOMY: Primary | ICD-10-CM

## 2021-01-07 PROCEDURE — 97140 MANUAL THERAPY 1/> REGIONS: CPT

## 2021-01-07 PROCEDURE — 97112 NEUROMUSCULAR REEDUCATION: CPT

## 2021-01-07 PROCEDURE — 97110 THERAPEUTIC EXERCISES: CPT

## 2021-01-07 NOTE — PROGRESS NOTES
Daily Note     Today's date: 2021  Patient name: Russell Szymanski  :   MRN: [id number]  Referring provider: Jonathon Ornelas DPM  Dx:   Encounter Diagnosis     ICD-10-CM    1  S/P bunionectomy  Z98 890                   Subjective: Pt denies pain R foot/toe at present  States noticing her big toe motion is improving  Objective: See treatment diary below      Assessment: Tolerated treatment well  No significant edema noted R foot /great toe  Added TM today as per plan, verbal cues for proper heel/toe gait, patient fatigued quickly  Patient demonstrated fatigue post treatment, exhibited good technique with therapeutic exercises and would benefit from continued PT      Plan: Continue per plan of care  Precautions: S/P Right Bunionectomy, DOS        Manuals    PROM to Right Ankle great toe   TM TM RR MJC     x10 min x10 min x10 min  10 min                   Neuro Re-Ed        Biodex Targets   LOS  Static, med dif, x2 LOS  Static, med dif, x2 LOS Static, medium difficulty x2  LOS Static, medium difficulty x2   Tandem Stance   Biodex postural control, L12, 30" x2 ea Biodex postural control, L12, 30" x2 ea Biodex, Postural control L12 30''x2 each  Biodex  Postural   Control  L12 30" x 2 ea   SLS Balance   Biodex postural stability, static, 20" x3 RLE Biodex postural stability, static, 20" x3 RLE Biodex Postural stability, Static 20''x3 RLE    Biodex  Postural  Stability  Static  20" x3 R LE                                   Ther Ex        NuStep   L3 x10 min L3 x10 min L3 x 10 min  L3 x10 min   Ankle TB 4 way  Blue TB x20 ea Blue TB x20 ea Blue TB x 20 each  Blue TB  x20 ea   Self Gastroc   W/ towel 10"x10 W/ towel 10"x10 Towel 10''10x Towel  10" x10   Great Toe Extension  10" x10 10" x10 10''10x 10" x10   Arch Lifts   5" x20 5" x20 5''20x 5" x20    Standing three way   x10 ea B/L x10 ea B/L x10 each B/L  x10 ea  B/L   Step Up  Step "B" x20 ea Step "B" x20 ea Step B x 20 each  Step  "B" x20 ea   Mini Squat  3" x20  3" x20  3''x20  3" x20   HR/TR  x20 ea x20 ea x20 each  x20 ea                                   Ther Activity                        Gait Training        Treadmill Walking   NV   1 4 mph  x2 min           Modalities

## 2021-01-11 ENCOUNTER — OFFICE VISIT (OUTPATIENT)
Dept: PHYSICAL THERAPY | Facility: CLINIC | Age: 71
End: 2021-01-11
Payer: MEDICARE

## 2021-01-11 DIAGNOSIS — Z98.890 S/P BUNIONECTOMY: Primary | ICD-10-CM

## 2021-01-11 PROCEDURE — 97112 NEUROMUSCULAR REEDUCATION: CPT

## 2021-01-11 PROCEDURE — 97140 MANUAL THERAPY 1/> REGIONS: CPT

## 2021-01-11 PROCEDURE — 97110 THERAPEUTIC EXERCISES: CPT

## 2021-01-11 NOTE — PROGRESS NOTES
Daily Note     Today's date: 2021  Patient name: Margie Sutherland  : [birth year]  MRN: [id number]  Referring provider: Fannie Cohen DPM  Dx:   Encounter Diagnosis     ICD-10-CM    1  S/P bunionectomy  Z98 890        Start Time: 945          Subjective: Pt reports less stiffness R great toe, states she returns to Dr Ashley Izaguirre 21  Objective: See treatment diary below      Assessment: Tolerated treatment well  Improved gait noted with TM today  Patient demonstrated fatigue post treatment, exhibited good technique with therapeutic exercises and would benefit from continued PT      Plan: Continue per plan of care  Precautions: S/P Right Bunionectomy, DOS        Manuals    PROM to Right Ankle great toe  TM TM RR MJC MJC    x10 min x10 min x10 min  10 min 10 min                   Neuro Re-Ed        Biodex Targets  LOS  Static, med dif, x2 LOS  Static, med dif, x2 LOS Static, medium difficulty x2  LOS Static, medium difficulty x2 LOS Static, medium difficulty x2   Tandem Stance  Biodex postural control, L12, 30" x2 ea Biodex postural control, L12, 30" x2 ea Biodex, Postural control L12 30''x2 each  Biodex  Postural   Control  L12 30" x 2 ea Biodex  Postural   Control  L12 30" x 2 ea   SLS Balance  Biodex postural stability, static, 20" x3 RLE Biodex postural stability, static, 20" x3 RLE Biodex Postural stability, Static 20''x3 RLE    Biodex  Postural  Stability  Static  20" x3 R LE Biodex  Postural  Stability  Static  20" x3 R LE                                   Ther Ex        NuStep  L3 x10 min L3 x10 min L3 x 10 min  L3 x10 min L3 x10 min   Ankle TB 4 way Blue TB x20 ea Blue TB x20 ea Blue TB x 20 each  Blue TB  x20 ea Blue  TB    Self Gastroc  W/ towel 10"x10 W/ towel 10"x10 Towel 10''10x Towel  10" x10 Towel  10" x10   Great Toe Extension 10" x10 10" x10 10''10x 10" x10 10" x10   Arch Lifts  5" x20 5" x20 5''20x 5" x20  5" x20   Standing three way  x10 ea B/L x10 ea B/L x10 each B/L  x10 ea  B/L 1# x10 ea  B/L   Step Up Step "B" x20 ea Step "B" x20 ea Step B x 20 each  Step  "B" x20 ea Step  "B" x   Mini Squat 3" x20  3" x20  3''x20  3" x20 3" x20   HR/TR x20 ea x20 ea x20 each  x20 ea x20 ea                                   Ther Activity                        Gait Training        Treadmill Walking  NV   1 4 mph  x2 min 1 5   x3 min           Modalities

## 2021-01-14 ENCOUNTER — EVALUATION (OUTPATIENT)
Dept: PHYSICAL THERAPY | Facility: CLINIC | Age: 71
End: 2021-01-14
Payer: MEDICARE

## 2021-01-14 DIAGNOSIS — Z98.890 S/P BUNIONECTOMY: Primary | ICD-10-CM

## 2021-01-14 PROCEDURE — 97110 THERAPEUTIC EXERCISES: CPT | Performed by: PHYSICAL THERAPIST

## 2021-01-14 PROCEDURE — 97112 NEUROMUSCULAR REEDUCATION: CPT | Performed by: PHYSICAL THERAPIST

## 2021-01-14 PROCEDURE — 97140 MANUAL THERAPY 1/> REGIONS: CPT | Performed by: PHYSICAL THERAPIST

## 2021-01-14 NOTE — PROGRESS NOTES
PT Progress Note     Today's date: 2021  Patient name: Eladio Littlejohn  : [id number]  MRN: [id number]  Referring provider: Jermaine Trivedi DPM  Dx:   Encounter Diagnosis     ICD-10-CM    1  S/P bunionectomy  [id number]                   Assessment  Assessment details: Eladio Littlejohn is a 65 y o  female who presents to outpatient PT with a  S/P bunionectomy  (primary encounter diagnosis)  No further referral appears necessary at this time based upon examination results  Pt presents with decreased strength, ROM, balance, functional activity tolerance, and pain with movement in her right foot and great toe  which is  limiting her ability to perform the aforementioned functional activities  Great toe flexion/extension is limited in all planes leading to gait dysfunction  Etiologic factors include repetitive poor body mechanics  Prognosis is good given HEP compliance and PT 2/wk  Please contact me if you have any questions or recommendations  Thank you for the opportunity to share in  [facility]  RA      Talita Dumont has demonstrated decreased pain, increased strength, increased range of motion, and increased activity tolerance since starting physical therapy services  She reports an overall improvement of 90% thus far  She continues to present with pain, decreased strength, decreased range of motion, and decreased activity tolerance and would benefit from additional skilled physical therapy interventions to address impairments and maximize function  Impairments: abnormal coordination, abnormal gait, abnormal or restricted ROM, abnormal movement, activity intolerance, impaired physical strength, lacks appropriate home exercise program, pain with function and weight-bearing intolerance  Functional limitations: Diffiuclty with ambulation, Stair negotiation, Prolonged sitting and standing Understanding of Dx/Px/POC: good   Prognosis: good    Goals  STG to be achieved in 4 weeks     1   Pt will reduce subjective pain rating by at least 50 percent the help facilitate return to PLOF  Met  2  Pt will improve Left ankle and great toe ROM by at least 10 degrees to help promote improved functional activity tolerance   Met   3  Pt will improve MMT scores by at least 1/2 grade to promote improved functional activity tolerance   Met     LTG to be achieved in 6-8 weeks      1  Pt will be complaint with HEP   Met   2  Pt will improve left ankle great toe ROM to [facility], to help facilitate independence with ADL's, IADL's, and functional activities   Progressing   3  Pt will improve left ankle Strength to [facility] to help facilitate independence with ADL's, IADL's, and functional activities   Progressing   4  Pt will have no limitations with ambulation to help facilitate independence at home and in the community  Progressing   5  Pt will have no limitations with stair negotiation to help facilitate independence at home and in the community   Progressing     Plan  Patient would benefit from: skilled physical therapy  Planned therapy interventions: ADL training, balance, balance/weight bearing training, flexibility, functional ROM exercises, gait training, graded activity, graded exercise, graded motor, joint mobilization, manual therapy, home exercise program, patient education, postural training, strengthening, stretching, therapeutic activities, therapeutic exercise, therapeutic training and neuromuscular re-education  Frequency: 2x week  Duration in weeks: 12  Plan of Care beginning date: 12/23/2020  Plan of Care expiration date: 3/23/2021  Treatment plan discussed with: patient, PTA and referring physician        Subjective Evaluation    History of Present Illness  Mechanism of injury: The patient is a 65year old female who presents to outpatient PT S/P right bunionectomy  DOS 9/29  Patient reports she is doing well, however her MD would like to see more movement in the great toe and forefoot       RA 1/14     The Patient reports an improvement of 90 percent since beginning skilled PT  Reports improvements with walking and stair negotiation  Feels ROM in great toe and right ankle is improving  Pain  No pain reported  Progression: improved    Treatments  Previous treatment: medication  Current treatment: physical therapy  Patient Goals  Patient goals for therapy: increased strength, decreased pain and increased motion          Objective     Active Range of Motion     Right Ankle/Foot   Dorsiflexion (kf): 8 degrees    RA 1/14              10 degrees      flexion: 60 degrees   RA 1/14    55 degrees    Inversion: 30 degrees   RA 1/14   35 deg     Eversion: 15 degrees   RA 1/14    20 deg       Great toe flexion: 10 degrees   RA 1/14       15 degrees     Great toe extension: 10 degrees   RA 1/14   18 degrees     Strength/Myotome Testing     Right Ankle/Foot   RA 1/14   Dorsiflexion: 4   4+  Plantar flexion: 4  4+  Inversion: 4   4+  Eversion: 4   4+       Precautions: S/P Right Bunionectomy, DOS 9/29       Manuals 1/14 12/31 1/4 1/7 1/11   PROM to Right Ankle great toe  RR  TM RR MJC MJC    x10 min x10 min x10 min  10 min 10 min                   Neuro Re-Ed        Biodex Targets  LOS  Static, med dif, x2 LOS  Static, med dif, x2 LOS Static, medium difficulty x2  LOS Static, medium difficulty x2 LOS Static, medium difficulty x2   Tandem Stance  Biodex postural control, L12, 30" x2 ea Biodex postural control, L12, 30" x2 ea Biodex, Postural control L12 30''x2 each  Biodex  Postural   Control  L12 30" x 2 ea Biodex  Postural   Control  L12 30" x 2 ea   SLS Balance  Biodex postural stability, static, 20" x3 RLE Biodex postural stability, static, 20" x3 RLE Biodex Postural stability, Static 20''x3 RLE    Biodex  Postural  Stability  Static  20" x3 R LE Biodex  Postural  Stability  Static  20" x3 R LE                                   Ther Ex        NuStep  L3 x10 min L3 x10 min L3 x 10 min  L3 x10 min L3 x10 min   Ankle TB 4 way Blue TB x20 ea Blue TB x20 ea Blue TB x 20 each  Blue TB  x20 ea Blue  TB    Self Gastroc  W/ towel 10"x10 W/ towel 10"x10 Towel 10''10x Towel  10" x10 Towel  10" x10   Great Toe Extension 10" x10 10" x10 10''10x 10" x10 10" x10   Arch Lifts  5" x20 5" x20 5''20x 5" x20  5" x20   Standing three way  x10 ea B/L x10 ea B/L x10 each B/L  x10 ea  B/L 1# x10 ea  B/L   Step Up Step "B" x20 ea Step "B" x20 ea Step B x 20 each  Step  "B" x20 ea Step  "B" x   Mini Squat 3" x20  3" x20  3''x20  3" x20 3" x20   HR/TR x20 ea x20 ea x20 each  x20 ea x20 ea                                   Ther Activity                        Gait Training        Treadmill Walking     1 4 mph  x2 min 1 5   x3 min           Modalities

## 2021-01-15 ENCOUNTER — OFFICE VISIT (OUTPATIENT)
Dept: FAMILY MEDICINE CLINIC | Facility: CLINIC | Age: 71
End: 2021-01-15
Payer: MEDICARE

## 2021-01-15 VITALS
DIASTOLIC BLOOD PRESSURE: 78 MMHG | HEART RATE: 91 BPM | WEIGHT: 167 LBS | OXYGEN SATURATION: 99 % | TEMPERATURE: 97.8 F | SYSTOLIC BLOOD PRESSURE: 138 MMHG | BODY MASS INDEX: 27.82 KG/M2 | HEIGHT: 65 IN | RESPIRATION RATE: 18 BRPM

## 2021-01-15 DIAGNOSIS — E55.9 VITAMIN D DEFICIENCY: ICD-10-CM

## 2021-01-15 DIAGNOSIS — Z13.29 SCREENING FOR THYROID DISORDER: ICD-10-CM

## 2021-01-15 DIAGNOSIS — E03.8 SUBCLINICAL HYPOTHYROIDISM: ICD-10-CM

## 2021-01-15 DIAGNOSIS — I10 ESSENTIAL HYPERTENSION: ICD-10-CM

## 2021-01-15 DIAGNOSIS — Z13.0 SCREENING FOR DEFICIENCY ANEMIA: ICD-10-CM

## 2021-01-15 DIAGNOSIS — Z13.220 SCREENING FOR HYPERLIPIDEMIA: ICD-10-CM

## 2021-01-15 DIAGNOSIS — L98.9 SKIN LESION OF LEFT ARM: Primary | ICD-10-CM

## 2021-01-15 DIAGNOSIS — Z13.1 SCREENING FOR DIABETES MELLITUS: ICD-10-CM

## 2021-01-15 DIAGNOSIS — Z12.31 ENCOUNTER FOR SCREENING MAMMOGRAM FOR MALIGNANT NEOPLASM OF BREAST: ICD-10-CM

## 2021-01-15 DIAGNOSIS — Z13.31 DEPRESSION SCREENING NEGATIVE: ICD-10-CM

## 2021-01-15 PROCEDURE — 99214 OFFICE O/P EST MOD 30 MIN: CPT | Performed by: NURSE PRACTITIONER

## 2021-01-15 PROCEDURE — 17110 DESTRUCTION B9 LES UP TO 14: CPT | Performed by: NURSE PRACTITIONER

## 2021-01-15 NOTE — PROGRESS NOTES
Assessment/Plan:    Problem List Items Addressed This Visit     Essential hypertension    Subclinical hypothyroidism    Relevant Orders    TSH, 3rd generation with Free T4 reflex      Other Visit Diagnoses     Skin lesion of left arm    -  Primary    Relevant Orders    Lesion Destruction (Completed)    Encounter for screening mammogram for malignant neoplasm of breast        Relevant Orders    Mammo screening bilateral w 3d & cad    Screening for deficiency anemia        Relevant Orders    CBC and differential    Screening for diabetes mellitus        Relevant Orders    Comprehensive metabolic panel    Screening for hyperlipidemia        Relevant Orders    Lipid Panel with Direct LDL reflex    Screening for thyroid disorder        Vitamin D deficiency        Relevant Orders    Vitamin D 25 hydroxy    Depression screening negative               Diagnoses and all orders for this visit:    Skin lesion of left arm  -     Lesion Destruction    Encounter for screening mammogram for malignant neoplasm of breast  -     Mammo screening bilateral w 3d & cad; Future    Screening for deficiency anemia  -     CBC and differential; Future    Screening for diabetes mellitus  -     Comprehensive metabolic panel; Future    Screening for hyperlipidemia  -     Lipid Panel with Direct LDL reflex; Future    Screening for thyroid disorder    Subclinical hypothyroidism  -     TSH, 3rd generation with Free T4 reflex; Future    Vitamin D deficiency  -     Vitamin D 25 hydroxy; Future    Depression screening negative    Essential hypertension        No problem-specific Assessment & Plan notes found for this encounter  Subjective:      Patient ID: Pari Child is a 65 y o  female  Pari Child is here for a routine 6 month visit  Skin Lesion  Talita Dumont is a 65 y o  female who is here today for a skin lesion  She describes it as a lump, that is located on her left arm  It was first noticed several months ago    It has been causing itching and is not changing, easily traumatized and unsightly  Previously this spot has been frozen with liquid nitrogen  Other spots that are concerning:  no skin lesions  Thyroid Problem  Presents for follow-up visit  Patient reports no palpitations  The symptoms have been stable  Her past medical history is significant for hyperlipidemia  There is no history of diabetes or heart failure  Hypertension  This is a chronic problem  The current episode started more than 1 year ago  The problem is controlled  Pertinent negatives include no anxiety, blurred vision, chest pain, headaches, malaise/fatigue, neck pain, orthopnea, palpitations, peripheral edema, PND, shortness of breath or sweats  Risk factors for coronary artery disease include dyslipidemia and post-menopausal state  Past treatments include calcium channel blockers and ACE inhibitors  The current treatment provides moderate improvement  There are no compliance problems  There is no history of angina, kidney disease, CAD/MI, CVA, heart failure, left ventricular hypertrophy, PVD or retinopathy  Identifiable causes of hypertension include a thyroid problem  There is no history of chronic renal disease, a hypertension causing med or sleep apnea  Hyperlipidemia  This is a chronic problem  Exacerbating diseases include hypothyroidism  She has no history of chronic renal disease, diabetes, liver disease, obesity or nephrotic syndrome  There are no known factors aggravating her hyperlipidemia  Pertinent negatives include no chest pain, focal sensory loss, focal weakness, leg pain, myalgias or shortness of breath  Current antihyperlipidemic treatment includes statins  There are no compliance problems    Risk factors for coronary artery disease include dyslipidemia, hypertension and post-menopausal        The following portions of the patient's history were reviewed and updated as appropriate:   She has a past medical history of Arthritis, Cataract, Hallux valgus of right foot, Hyperlipidemia, Hypertension, RLL pneumonia, and Wears glasses  ,  does not have any pertinent problems on file  ,   has a past surgical history that includes Bladder suspension; Eye surgery (Right); Colonoscopy; [place] tooth extraction; and pr corrj hallux valgus w/sesmdc w/dist metar osteot (Right, 9/29/2020)  ,  family history includes Colon cancer in her family; Colon cancer (age of onset: 67) in her mother; Coronary artery disease in her family; Diabetes in her father and mother; Diabetes type II in her family; Heart disease in her father and mother; No Known Problems in her daughter, maternal grandfather, maternal grandmother, paternal grandmother, sister, and son ,   reports that she has never smoked  She has never used smokeless tobacco  She reports current alcohol use  She reports that she does not use drugs  ,  has No Known Allergies     Current Outpatient Medications   Medication Sig Dispense Refill    amLODIPine-benazepril (LOTREL) 10-40 MG per capsule TAKE 1 CAPSULE BY MOUTH  DAILY 90 capsule 1    Calcium Carbonate-Vitamin D 600-200 MG-UNIT CAPS Take 1 capsule by mouth daily      cholecalciferol (VITAMIN D3) 1,000 units tablet Take 1,000 Units by mouth daily      cyanocobalamin (VITAMIN B-12) 500 mcg tablet Take 500 mcg by mouth daily      diclofenac sodium (VOLTAREN) 1 % APPLY 2 GRAMS TOPICALLY 4  (FOUR) TIMES A DAY (Patient taking differently: Apply topically 4 (four) times a day as needed ) 600 g 2    diclofenac sodium (VOLTAREN) 50 mg EC tablet Take 1 tablet (50 mg total) by mouth 2 (two) times a day Take with food (Patient taking differently: Take 50 mg by mouth 2 (two) times a day Take with food- pt taking daily) 180 tablet 2    Melatonin 5 MG CHEW Chew 1 tablet as needed      rosuvastatin (CRESTOR) 10 MG tablet Take 10 mg by mouth daily      zolpidem (AMBIEN) 10 mg tablet TAKE 1 TABLET BY MOUTH  EVERY NIGHT AT BEDTIME AS  NEEDED FOR INSOMNIA 90 tablet 0 No current facility-administered medications for this visit  BMI Counseling: Body mass index is 27 79 kg/m²  The BMI is above normal  Nutrition recommendations include decreasing portion sizes, encouraging healthy choices of fruits and vegetables, decreasing fast food intake, consuming healthier snacks, limiting drinks that contain sugar, moderation in carbohydrate intake, increasing intake of lean protein, reducing intake of saturated and trans fat and reducing intake of cholesterol  Exercise recommendations include moderate physical activity 150 minutes/week, exercising 3-5 times per week and strength training exercises  No pharmacotherapy was ordered  Depression Screening and Follow-up Plan: Patient's depression screening was positive with a PHQ-2 score of 0  Clincally patient does not have depression  No treatment is required  Falls Plan of Care: balance, strength, and gait training instructions were provided  Medications that increase falls were reviewed  Assessed feet and footwear  Review of Systems   Constitutional: Negative  Negative for malaise/fatigue  HENT: Negative  Eyes: Negative  Negative for blurred vision  Respiratory: Negative  Negative for shortness of breath  Cardiovascular: Negative  Negative for chest pain, palpitations, orthopnea and PND  Gastrointestinal: Negative  Endocrine: Negative  Genitourinary: Negative  Musculoskeletal: Negative  Negative for myalgias and neck pain  Skin: Negative  Lesion left posterior axilla   Allergic/Immunologic: Negative  Neurological: Negative  Negative for focal weakness and headaches  Hematological: Negative  Psychiatric/Behavioral: Negative  Objective:  Vitals:    01/15/21 1424   BP: 138/78   Pulse: 91   Resp: 18   Temp: 97 8 °F (36 6 °C)   SpO2: 99%   Weight: 75 8 kg (167 lb)   Height: 5' 5" (1 651 m)     Body mass index is 27 79 kg/m²       Physical Exam  Vitals signs and nursing note reviewed  Constitutional:       Appearance: Normal appearance  She is well-developed  HENT:      Head: Normocephalic and atraumatic  Right Ear: Tympanic membrane, ear canal and external ear normal       Left Ear: Tympanic membrane, ear canal and external ear normal       Nose: Nose normal       Mouth/Throat:      Mouth: Mucous membranes are moist       Pharynx: Uvula midline  Eyes:      General: Lids are normal       Conjunctiva/sclera: Conjunctivae normal       Pupils: Pupils are equal, round, and reactive to light  Neck:      Musculoskeletal: Full passive range of motion without pain, normal range of motion and neck supple  Thyroid: No thyroid mass or thyromegaly  Vascular: No JVD  Trachea: Trachea and phonation normal    Cardiovascular:      Rate and Rhythm: Normal rate and regular rhythm  Pulses: Normal pulses  Heart sounds: Normal heart sounds, S1 normal and S2 normal  No murmur  No friction rub  No gallop  Pulmonary:      Effort: Pulmonary effort is normal       Breath sounds: Normal breath sounds  Abdominal:      General: Bowel sounds are normal       Palpations: Abdomen is soft  Tenderness: There is no abdominal tenderness  Genitourinary:     Comments: Deferred   Musculoskeletal: Normal range of motion  Right lower leg: No edema  Left lower leg: No edema  Skin:     General: Skin is warm and dry  Capillary Refill: Capillary refill takes less than 2 seconds  Neurological:      General: No focal deficit present  Mental Status: She is alert and oriented to person, place, and time  Cranial Nerves: Cranial nerves are intact  No cranial nerve deficit  Sensory: Sensation is intact  Motor: Motor function is intact  Coordination: Coordination is intact  Gait: Gait is intact  Deep Tendon Reflexes: Reflexes are normal and symmetric     Psychiatric:         Attention and Perception: Attention and perception normal          Mood and Affect: Mood and affect normal          Speech: Speech normal          Behavior: Behavior normal  Behavior is cooperative  Thought Content: Thought content normal          Cognition and Memory: Cognition normal          Judgment: Judgment normal          Lesion Destruction    Date/Time: 1/15/2021 3:39 PM  Performed by: ANDRADE Chiu Mt  Authorized by: ANDRADE Chiu Mt   Universal Protocol:  Consent: Verbal consent obtained    Risks and benefits: risks, benefits and alternatives were discussed  Consent given by: patient  Timeout called at: 1/15/2021 2:59 AM   Patient understanding: patient states understanding of the procedure being performed  Patient consent: the patient's understanding of the procedure matches consent given  Procedure consent: procedure consent matches procedure scheduled  Patient identity confirmed: verbally with patient      Procedure Details - Lesion Destruction:     Number of Lesions:  1  Lesion 1:     Body area:  Upper extremity    Upper extremity location:  L upper arm    Initial size (mm):  6    Malignancy: benign lesion      Destruction method: surgical removal

## 2021-01-15 NOTE — PATIENT INSTRUCTIONS
COVID Vaccine updates    We are committed to getting you vaccinated as soon as possible and will be closely following CDC and [facility]  guidelines as they are released and revised  Please refer to our COVID-19 vaccine webpage  www [facility] [email or web address] for the most up to date information on the vaccine and our distribution efforts  You can PRE-register for the COVID-19 Vaccine via \[facility]\"" & [facility] SERVICES! Download [employer] [facility]/setup Prom[employer]ucket on computer    1) Log into Promobucket  2) Click on Promobucket icon on bottom of page  3) Click on the Questionnaire icon  4) Select COVID-19 Vaccine pre-reg  5) Select what phase you fall under  6) Click submit  7) You will be notified when it's your turn! Care For Your Stitches   AMBULATORY CARE:   Stitches,  or sutures, are used to close cuts and wounds on the skin  Stitches need to be removed after your wound has healed  Seek care immediately if:   · Your stitches come apart  · Blood soaks through your bandages  · You suddenly cannot move your injured joint  · You have sudden numbness around your wound  · You see red streaks coming from your wound  Contact your healthcare provider if:   · You have a fever and chills  · Your wound is red, warm, swollen, or leaking pus  · There is a bad smell coming from your wound  · You have increased pain in the wound area  · You have questions or concerns about your condition or care  Care for your stitches:   · Protect the stitches  You may need to cover your stitches with a bandage for 24 to 48 hours, or as directed  Do not bump or hit the suture area  This could open the wound  Do not trim or shorten the ends of your stitches  If they rub on your clothing, put a gauze bandage between the stitches and your clothes  · Clean the area as directed  Carefully wash your wound with soap and water  For mouth and lip wounds, rinse your mouth after meals and at bedtime   Ask your healthcare provider what to use to rinse your mouth  If you have a scalp wound, you may gently wash your hair every 2 days with mild shampoo  Do not use hair products, such as hair spray  Check your wound for signs of infection when you clean it  Signs include redness, swelling, and pus  · Keep the area dry as directed  Wait 12 to 24 hours after you receive your stitches before you take a shower  Take showers instead of baths  Do not take a bath or swim  Your healthcare provider will give you instructions for bathing with your stitches  Help your wound heal:   · Elevate your wound  Keep your wound above the level of your heart as often as you can  This will help decrease swelling and pain  Prop the area on pillows or blankets, if possible, to keep it elevated comfortably  · Limit activity  Do not stretch the skin around your wound  This will help prevent bleeding and swelling  Follow up with your healthcare provider as directed: You may need to return to have your stitches removed  Write down your questions so you remember to ask them during your visits  © Copyright [address] Information is for End User's use only and may not be sold, redistributed or otherwise used for commercial purposes  All illustrations and images included in CareNotes® are the copyrighted property of A D [employer] M [employer] Inc  or [address] [place], [place] [address]   The above information is an  only  It is not intended as medical advice for individual conditions or treatments  Talk to your doctor, nurse or pharmacist before following any medical regimen to see if it is safe and effective for you

## 2021-01-19 ENCOUNTER — OFFICE VISIT (OUTPATIENT)
Dept: PHYSICAL THERAPY | Facility: CLINIC | Age: 71
End: 2021-01-19
Payer: MEDICARE

## 2021-01-19 DIAGNOSIS — Z98.890 S/P BUNIONECTOMY: Primary | ICD-10-CM

## 2021-01-19 PROCEDURE — 97140 MANUAL THERAPY 1/> REGIONS: CPT | Performed by: PHYSICAL THERAPIST

## 2021-01-19 PROCEDURE — 97110 THERAPEUTIC EXERCISES: CPT | Performed by: PHYSICAL THERAPIST

## 2021-01-19 PROCEDURE — 97112 NEUROMUSCULAR REEDUCATION: CPT | Performed by: PHYSICAL THERAPIST

## 2021-01-19 NOTE — PROGRESS NOTES
Daily Note     Today's date: 2021  Patient name: Baron Moon  :   MRN: [id number]  Referring provider: Kimberlee Head DPM  Dx:   Encounter Diagnosis     ICD-10-CM    1  S/P bunionectomy  [id number]                   Subjective: " I saw the Dr  She said that everything looks good, and to continue what I am doing ":       Objective: See treatment diary below      Assessment: Tolerated treatment well  Patient exhibited good technique with therapeutic exercises and would benefit from continued PT      Plan: Continue per plan of care  Precautions: S/P Right Bunionectomy, DOS        Manuals    PROM to Right Ankle great toe  RR  RR RR MJC MJC    x10 min x10 min x10 min  10 min 10 min                   Neuro Re-Ed        Biodex Targets  LOS  Static, med dif, x2 LOS  Static, med dif, x2 LOS Static, medium difficulty x2  LOS Static, medium difficulty x2 LOS Static, medium difficulty x2   Tandem Stance  Biodex postural control, L12, 30" x2 ea Biodex postural control, L12, 30" x2 ea Biodex, Postural control L12 30''x2 each  Biodex  Postural   Control  L12 30" x 2 ea Biodex  Postural   Control  L12 30" x 2 ea   SLS Balance  Biodex postural stability, static, 20" x3 RLE Biodex postural stability, static, 20" x3 RLE Biodex Postural stability, Static 20''x3 RLE    Biodex  Postural  Stability  Static  20" x3 R LE Biodex  Postural  Stability  Static  20" x3 R LE                                   Ther Ex        NuStep  L3 x10 min L3 x10 min L3 x 10 min  L3 x10 min L3 x10 min   Ankle TB 4 way Blue TB x20 ea Black TB x20 ea Blue TB x 20 each  Blue TB  x20 ea Blue  TB    Self Gastroc  W/ towel 10"x10 W/ towel 10"x10 Towel 10''10x Towel  10" x10 Towel  10" x10   Great Toe Extension 10" x10 10" x10 10''10x 10" x10 10" x10   Arch Lifts  5" x20 5" x20 5''20x 5" x20  5" x20   Standing three way  x10 ea B/L x10 ea B/L x10 each B/L  x10 ea  B/L 1# x10 ea  B/L   Step Up Step "B" x20 ea Step "B" x20 ea Step B x 20 each  Step  "B" x20 ea Step  "B" x   Mini Squat 3" x20  3" x20  3''x20  3" x20 3" x20   HR/TR x20 ea x20 ea x20 each  x20 ea x20 ea                                   Ther Activity                        Gait Training        Treadmill Walking     1 4 mph  x2 min 1 5   x3 min           Modalities

## 2021-01-22 ENCOUNTER — OFFICE VISIT (OUTPATIENT)
Dept: PHYSICAL THERAPY | Facility: CLINIC | Age: 71
End: 2021-01-22
Payer: MEDICARE

## 2021-01-22 ENCOUNTER — OFFICE VISIT (OUTPATIENT)
Dept: FAMILY MEDICINE CLINIC | Facility: CLINIC | Age: 71
End: 2021-01-22
Payer: MEDICARE

## 2021-01-22 VITALS
DIASTOLIC BLOOD PRESSURE: 76 MMHG | HEIGHT: 65 IN | HEART RATE: 63 BPM | SYSTOLIC BLOOD PRESSURE: 122 MMHG | BODY MASS INDEX: 27.66 KG/M2 | WEIGHT: 166 LBS | RESPIRATION RATE: 18 BRPM | OXYGEN SATURATION: 98 % | TEMPERATURE: 97 F

## 2021-01-22 DIAGNOSIS — Z98.890 S/P BUNIONECTOMY: Primary | ICD-10-CM

## 2021-01-22 DIAGNOSIS — Z48.02 ENCOUNTER FOR REMOVAL OF SUTURES: Primary | ICD-10-CM

## 2021-01-22 PROCEDURE — 99213 OFFICE O/P EST LOW 20 MIN: CPT | Performed by: NURSE PRACTITIONER

## 2021-01-22 PROCEDURE — 97110 THERAPEUTIC EXERCISES: CPT | Performed by: PHYSICAL THERAPIST

## 2021-01-22 PROCEDURE — 97140 MANUAL THERAPY 1/> REGIONS: CPT | Performed by: PHYSICAL THERAPIST

## 2021-01-22 PROCEDURE — 97112 NEUROMUSCULAR REEDUCATION: CPT | Performed by: PHYSICAL THERAPIST

## 2021-01-22 NOTE — PATIENT INSTRUCTIONS
January 22, 2021     Frantz Pan    Dear Ms Dumont: Thank you for contacting us! Our commitment to you is to keep you informed  [place] to let you know when its your turn for the COVID-19 vaccine  Please go under your questionnaires tab in [employer] (health tab on the web, questionnaire button on the domo ) and complete the [employer] questionnaire  This will help us communicate with you when vaccine spots are available for your phase  Please let us know if you have any further questions or concerns  Sincerely,    [facility]'s Patient Technical Services Desk    Additional Information:  If you have questions, call [phone]) choose option 5 to talk to our customer support staff  Remember, [employer] is NOT to be used for urgent needs  For medical emergencies, dial 911  COVID Vaccine updates    We are committed to getting you vaccinated as soon as possible and will be closely following CDC and [facility]  guidelines as they are released and revised  Please refer to our COVID-19 vaccine webpage  www [email or web address] for the most up to date information on the vaccine and our distribution efforts  You can PRE-register for the COVID-19 Vaccine via [facility]! Download [email or web address]/setup [email or web address] on computer    1) Log into [employer]  2) Click on [employer] icon on bottom of page  3) Click on the Questionnaire icon  4) Select COVID-19 Vaccine pre-reg  5) Select what phase you fall under  6) Click submit  7) You will be notified when it's your turn!

## 2021-01-22 NOTE — PROGRESS NOTES
Daily Note     Today's date: 2021  Patient name: Aida Smith  :   MRN: [id number]  Referring provider: Aidee Taylor DPM  Dx:   Encounter Diagnosis     ICD-10-CM    1  S/P bunionectomy  Z98 890                   Subjective: " My toe is a little sore today "       Objective: See treatment diary below      Assessment: Tolerated treatment well  Patient exhibited good technique with therapeutic exercises and would benefit from continued PT      Plan: Continue per plan of care  Precautions: S/P Right Bunionectomy, DOS        Manuals    PROM to Right Ankle great toe  RR  RR RR MJC MJC    x10 min x10 min x10 min  10 min 10 min                   Neuro Re-Ed        Biodex Targets  LOS  Static, med dif, x2 LOS  Static, med dif, x2 LOS Static, medium difficulty x2  LOS Static, medium difficulty x2 LOS Static, medium difficulty x2   Tandem Stance  Biodex postural control, L12, 30" x2 ea Biodex postural control, L12, 30" x2 ea Biodex, Postural control L12 30''x2 each  Biodex  Postural   Control  L12 30" x 2 ea Biodex  Postural   Control  L12 30" x 2 ea   SLS Balance  Biodex postural stability, static, 20" x3 RLE Biodex postural stability, static, 20" x3 RLE Biodex Postural stability, Static 20''x3 RLE    Biodex  Postural  Stability  Static  20" x3 R LE Biodex  Postural  Stability  Static  20" x3 R LE                                   Ther Ex        NuStep  L3 x10 min L3 x10 min L3 x 10 min  L3 x10 min L3 x10 min   Ankle TB 4 way Blue TB x20 ea Black TB x20 ea Blue TB x 20 each  Blue TB  x20 ea Blue  TB    Self Gastroc  W/ towel 10"x10 W/ towel 10"x10 Towel 10''10x Towel  10" x10 Towel  10" x10   Great Toe Extension 10" x10 10" x10 10''10x 10" x10 10" x10   Arch Lifts  5" x20 5" x20 5''20x 5" x20  5" x20   Standing three way  x10 ea B/L x10 ea B/L x10 each B/L  x10 ea  B/L 1# x10 ea  B/L   Step Up Step "B" x20 ea Step "B" x20 ea Step B x 20 each  Step  "B" x20 ea Step  "B" x Mini Squat 3" x20  3" x20  3''x20  3" x20 3" x20   HR/TR x20 ea x20 ea x20 each  x20 ea x20 ea                                   Ther Activity                        Gait Training        Treadmill Walking     1 4 mph  x2 min 1 5   x3 min           Modalities

## 2021-01-22 NOTE — PROGRESS NOTES
Assessment/Plan:    Problem List Items Addressed This Visit     None      Visit Diagnoses     Encounter for removal of sutures    -  Primary           Diagnoses and all orders for this visit:    Encounter for removal of sutures    Other orders  -     Suture removal        No problem-specific Assessment & Plan notes found for this encounter  Subjective:      Patient ID: Jennifer Post is a 65 y o  female  Jennifer Post presents for suture removal s/p lesion removal left posterior axilla  Suture / Staple Removal  The sutures were placed 3 to 6 days ago  There has been no drainage from the wound  There is no redness present  There is no swelling present  There is no pain present  The following portions of the patient's history were reviewed and updated as appropriate:   She has a past medical history of Arthritis, Cataract, Hallux valgus of right foot, Hyperlipidemia, Hypertension, RLL pneumonia, and Wears glasses  ,  does not have any pertinent problems on file  ,   has a past surgical history that includes Bladder suspension; Eye surgery (Right); Colonoscopy; [place] tooth extraction; and pr corrj hallux valgus w/sesmdc w/dist metar osteot (Right, 9/29/2020)  ,  family history includes Colon cancer in her family; Colon cancer (age of onset: 67) in her mother; Coronary artery disease in her family; Diabetes in her father and mother; Diabetes type II in her family; Heart disease in her father and mother; No Known Problems in her daughter, maternal grandfather, maternal grandmother, paternal grandmother, sister, and son ,   reports that she has never smoked  She has never used smokeless tobacco  She reports current alcohol use  She reports that she does not use drugs  ,  has No Known Allergies     Current Outpatient Medications   Medication Sig Dispense Refill    amLODIPine-benazepril (LOTREL) 10-40 MG per capsule TAKE 1 CAPSULE BY MOUTH  DAILY 90 capsule 1    Calcium Carbonate-Vitamin D 600-200 MG-UNIT CAPS Take 1 capsule by mouth daily      cholecalciferol (VITAMIN D3) 1,000 units tablet Take 1,000 Units by mouth daily      cyanocobalamin (VITAMIN B-12) 500 mcg tablet Take 500 mcg by mouth daily      diclofenac sodium (VOLTAREN) 1 % APPLY 2 GRAMS TOPICALLY 4  (FOUR) TIMES A DAY (Patient taking differently: Apply topically 4 (four) times a day as needed ) 600 g 2    diclofenac sodium (VOLTAREN) 50 mg EC tablet Take 1 tablet (50 mg total) by mouth 2 (two) times a day Take with food (Patient taking differently: Take 50 mg by mouth 2 (two) times a day Take with food- pt taking daily) 180 tablet 2    Melatonin 5 MG CHEW Chew 1 tablet as needed      rosuvastatin (CRESTOR) 10 MG tablet Take 10 mg by mouth daily      zolpidem (AMBIEN) 10 mg tablet TAKE 1 TABLET BY MOUTH  EVERY NIGHT AT BEDTIME AS  NEEDED FOR INSOMNIA 90 tablet 0     No current facility-administered medications for this visit  Review of Systems   Constitutional: Negative  HENT: Negative  Eyes: Negative  Respiratory: Negative  Cardiovascular: Negative  Gastrointestinal: Negative  Endocrine: Negative  Genitourinary: Negative  Musculoskeletal: Negative  Skin: Positive for wound  Allergic/Immunologic: Negative  Neurological: Negative  Hematological: Negative  Psychiatric/Behavioral: Negative  Objective:  Vitals:    01/22/21 1231   BP: 122/76   Pulse: 63   Resp: 18   Temp: (!) 97 °F (36 1 °C)   SpO2: 98%   Weight: 75 3 kg (166 lb)   Height: 5' 5" (1 651 m)     Body mass index is 27 62 kg/m²  Physical Exam  Vitals signs and nursing note reviewed  Constitutional:       Appearance: Normal appearance  She is well-developed  HENT:      Head: Normocephalic and atraumatic        Right Ear: Tympanic membrane, ear canal and external ear normal       Left Ear: Tympanic membrane, ear canal and external ear normal       Nose: Nose normal       Mouth/Throat:      Mouth: Mucous membranes are moist       Pharynx: Uvula midline  Eyes:      General: Lids are normal       Conjunctiva/sclera: Conjunctivae normal       Pupils: Pupils are equal, round, and reactive to light  Neck:      Musculoskeletal: Full passive range of motion without pain, normal range of motion and neck supple  Thyroid: No thyroid mass or thyromegaly  Vascular: No JVD  Trachea: Trachea and phonation normal    Cardiovascular:      Rate and Rhythm: Normal rate and regular rhythm  Pulses: Normal pulses  Heart sounds: Normal heart sounds, S1 normal and S2 normal  No murmur  No friction rub  No gallop  Pulmonary:      Effort: Pulmonary effort is normal       Breath sounds: Normal breath sounds  Abdominal:      General: Bowel sounds are normal       Palpations: Abdomen is soft  Tenderness: There is no abdominal tenderness  Genitourinary:     Comments: Deferred   Musculoskeletal: Normal range of motion  Right lower leg: No edema  Left lower leg: No edema  Skin:     General: Skin is warm and dry  Capillary Refill: Capillary refill takes less than 2 seconds  Neurological:      General: No focal deficit present  Mental Status: She is alert and oriented to person, place, and time  Cranial Nerves: Cranial nerves are intact  No cranial nerve deficit  Sensory: Sensation is intact  Motor: Motor function is intact  Coordination: Coordination is intact  Gait: Gait is intact  Deep Tendon Reflexes: Reflexes are normal and symmetric  Psychiatric:         Attention and Perception: Attention and perception normal          Mood and Affect: Mood and affect normal          Speech: Speech normal          Behavior: Behavior normal  Behavior is cooperative  Thought Content:  Thought content normal          Cognition and Memory: Cognition normal          Judgment: Judgment normal          Suture removal    Date/Time: 1/22/2021 12:45 PM  Performed by: ANDRADE Shah  Authorized by: ANDRADE Shah   Universal Protocol:  Procedure performed by:  Consent: Verbal consent obtained  Consent given by: patient  Patient understanding: patient states understanding of the procedure being performed  Patient consent: the patient's understanding of the procedure matches consent given        Patient location:  Clinic  Location:     Laterality:  Left    Location:  Upper extremity    Upper extremity location:  Arm    Arm location:  L upper arm  Procedure details: Tools used:  Suture removal kit    Wound appearance:  No sign(s) of infection    Number of sutures removed:  2  Post-procedure details:     Post-removal:  No dressing applied    Patient tolerance of procedure:   Tolerated well, no immediate complications

## 2021-01-25 ENCOUNTER — OFFICE VISIT (OUTPATIENT)
Dept: PHYSICAL THERAPY | Facility: CLINIC | Age: 71
End: 2021-01-25
Payer: MEDICARE

## 2021-01-25 DIAGNOSIS — E78.01 FAMILIAL HYPERCHOLESTEROLEMIA: Primary | ICD-10-CM

## 2021-01-25 DIAGNOSIS — Z98.890 S/P BUNIONECTOMY: Primary | ICD-10-CM

## 2021-01-25 PROCEDURE — 97140 MANUAL THERAPY 1/> REGIONS: CPT | Performed by: PHYSICAL THERAPIST

## 2021-01-25 PROCEDURE — 97110 THERAPEUTIC EXERCISES: CPT | Performed by: PHYSICAL THERAPIST

## 2021-01-25 PROCEDURE — 97112 NEUROMUSCULAR REEDUCATION: CPT | Performed by: PHYSICAL THERAPIST

## 2021-01-25 RX ORDER — ROSUVASTATIN CALCIUM 10 MG/1
10 TABLET, COATED ORAL DAILY
Qty: 90 TABLET | Refills: 1 | Status: SHIPPED | OUTPATIENT
Start: 2021-01-25 | End: 2021-02-08 | Stop reason: SDUPTHER

## 2021-01-25 NOTE — PROGRESS NOTES
Daily Note     Today's date: 2021  Patient name: Maite Mendoza  :   MRN: [id number]  Referring provider: Gabriele Arriaga DPM  Dx:   Encounter Diagnosis     ICD-10-CM    1  S/P bunionectomy  Z98 890                   Subjective: " My foot feels pretty good today "       Objective: See treatment diary below      Assessment: Tolerated treatment well  Patient exhibited good technique with therapeutic exercises and would benefit from continued PT      Plan: Continue per plan of care  Precautions: S/P Right Bunionectomy, DOS        Manuals    PROM to Right Ankle great toe  RR  RR RR RR MJC    x10 min x10 min x10 min  10 min 10 min                   Neuro Re-Ed        Biodex Targets  LOS  Static, med dif, x2 LOS  Static, med dif, x2 LOS Static, medium difficulty x2  LOS Static, medium difficulty x2 LOS Static, medium difficulty x2   Tandem Stance  Biodex postural control, L12, 30" x2 ea Biodex postural control, L12, 30" x2 ea Biodex, Postural control L12 30''x2 each  Biodex  Postural   Control  L12 30" x 2 ea Biodex  Postural   Control  L12 30" x 2 ea   SLS Balance  Biodex postural stability, static, 20" x3 RLE Biodex postural stability, static, 20" x3 RLE Biodex Postural stability, Static 20''x3 RLE    Biodex  Postural  Stability  Static  20" x3 R LE Biodex  Postural  Stability  Static  20" x3 R LE                                   Ther Ex        NuStep  L3 x10 min L3 x10 min L3 x 10 min  L3 x10 min L3 x10 min   Ankle TB 4 way Blue TB x20 ea Black TB x20 ea Blue TB x 20 each  Blue TB  x20 ea Blue  TB    Self Gastroc  W/ towel 10"x10 W/ towel 10"x10 Towel 10''10x Towel  10" x10 Towel  10" x10   Great Toe Extension 10" x10 10" x10 10''10x 10" x10 10" x10   Arch Lifts  5" x20 5" x20 5''20x 5" x20  5" x20   Standing three way  x10 ea B/L x10 ea B/L x10 each B/L  x10 1 5# ea  B/L 1# x10 ea  B/L   Step Up Step "B" x20 ea Step "B" x20 ea Step B x 20 each  Step  "B" x20 ea Step  "B" x   Mini Squat 3" x20  3" x20  3''x20  3" x20 3" x20   HR/TR x20 ea x20 ea x20 each  x20 ea x20 ea                                   Ther Activity                        Gait Training        Treadmill Walking      1 5   x3 min           Modalities

## 2021-01-27 ENCOUNTER — TELEPHONE (OUTPATIENT)
Dept: FAMILY MEDICINE CLINIC | Facility: CLINIC | Age: 71
End: 2021-01-27

## 2021-01-27 NOTE — TELEPHONE ENCOUNTER
Asking for copies for all her receipts from her copays from 2020 to present so she can use them for her ins     Please call when done

## 2021-01-28 ENCOUNTER — OFFICE VISIT (OUTPATIENT)
Dept: PHYSICAL THERAPY | Facility: CLINIC | Age: 71
End: 2021-01-28
Payer: MEDICARE

## 2021-01-28 DIAGNOSIS — Z98.890 S/P BUNIONECTOMY: Primary | ICD-10-CM

## 2021-01-28 PROCEDURE — 97140 MANUAL THERAPY 1/> REGIONS: CPT | Performed by: PHYSICAL THERAPIST

## 2021-01-28 PROCEDURE — 97110 THERAPEUTIC EXERCISES: CPT | Performed by: PHYSICAL THERAPIST

## 2021-01-28 NOTE — PROGRESS NOTES
Daily Note     Today's date: 2021  Patient name: Lester Larson  :   MRN: [id number]  Referring provider: Carmelina Mcdowell DPM  Dx:   Encounter Diagnosis     ICD-10-CM    1  S/P bunionectomy  Z98 890                   Subjective: " I feel good  Today is my last day  I am leaving for [place] on Saturday "       Objective: See treatment diary below      Assessment: Tolerated treatment well  Patient exhibited good technique with therapeutic exercises and would benefit from continued PT  At this time, patient has achieved their maximum functional benefit from skilled physical therapy services and [facility]  Patient is in agreement with the plan of care  As a result, patient is discharged from physical therapy        Plan: Continue per plan of care  Precautions: S/P Right Bunionectomy, DOS        Manuals     PROM to Right Ankle great toe  RR  RR RR RR RR    x10 min x10 min x10 min  10 min 10 min                   Neuro Re-Ed        Biodex Targets  LOS  Static, med dif, x2 LOS  Static, med dif, x2 LOS Static, medium difficulty x2  LOS Static, medium difficulty x2 LOS Static, medium difficulty x2   Tandem Stance  Biodex postural control, L12, 30" x2 ea Biodex postural control, L12, 30" x2 ea Biodex, Postural control L12 30''x2 each  Biodex  Postural   Control  L12 30" x 2 ea Biodex  Postural   Control  L12 30" x 2 ea   SLS Balance  Biodex postural stability, static, 20" x3 RLE Biodex postural stability, static, 20" x3 RLE Biodex Postural stability, Static 20''x3 RLE    Biodex  Postural  Stability  Static  20" x3 R LE Biodex  Postural  Stability  Static  20" x3 R LE                                   Ther Ex        NuStep  L3 x10 min L3 x10 min L3 x 10 min  L3 x10 min L3 x10 min   Ankle TB 4 way Blue TB x20 ea Black TB x20 ea Blue TB x 20 each  Blue TB  x20 ea Blue  TB    Self Gastroc  W/ towel 10"x10 W/ towel 10"x10 Towel 10''10x Towel  10" x10 Towel  10" x10   Great Toe Extension 10" x10 10" x10 10''10x 10" x10 10" x10   Arch Lifts  5" x20 5" x20 5''20x 5" x20  5" x20   Standing three way  x10 ea B/L x10 ea B/L x10 each B/L  x10 1 5# ea  B/L 1 5# x10 ea  B/L   Step Up Step "B" x20 ea Step "B" x20 ea Step B x 20 each  Step  "B" x20 ea Step  "B" x   Mini Squat 3" x20  3" x20  3''x20  3" x20 3" x20   HR/TR x20 ea x20 ea x20 each  x20 ea x20 ea                                   Ther Activity                        Gait Training        Treadmill Walking                 Modalities

## 2021-02-08 DIAGNOSIS — E78.01 FAMILIAL HYPERCHOLESTEROLEMIA: ICD-10-CM

## 2021-02-08 RX ORDER — ROSUVASTATIN CALCIUM 10 MG/1
10 TABLET, COATED ORAL DAILY
Qty: 90 TABLET | Refills: 0 | Status: SHIPPED | OUTPATIENT
Start: 2021-02-08 | End: 2021-03-31 | Stop reason: SDUPTHER

## 2021-02-08 NOTE — TELEPHONE ENCOUNTER
Has a prescritption (Rosuvastatin) that was sent to her mail order but was routed home  She is in [place] for another 6-8 weeks  Can we re-route to [facility] pharmacy [address], [address]

## 2021-03-05 DIAGNOSIS — Z23 ENCOUNTER FOR IMMUNIZATION: ICD-10-CM

## 2021-03-31 DIAGNOSIS — I10 ESSENTIAL HYPERTENSION: ICD-10-CM

## 2021-03-31 DIAGNOSIS — E78.01 FAMILIAL HYPERCHOLESTEROLEMIA: ICD-10-CM

## 2021-03-31 RX ORDER — AMLODIPINE BESYLATE AND BENAZEPRIL HYDROCHLORIDE 10; 40 MG/1; MG/1
1 CAPSULE ORAL DAILY
Qty: 90 CAPSULE | Refills: 1 | Status: SHIPPED | OUTPATIENT
Start: 2021-03-31 | End: 2021-08-16 | Stop reason: SDUPTHER

## 2021-03-31 RX ORDER — ROSUVASTATIN CALCIUM 10 MG/1
10 TABLET, COATED ORAL DAILY
Qty: 90 TABLET | Refills: 1 | Status: SHIPPED | OUTPATIENT
Start: 2021-03-31 | End: 2021-08-16 | Stop reason: SDUPTHER

## 2021-06-29 ENCOUNTER — HOSPITAL ENCOUNTER (OUTPATIENT)
Dept: MAMMOGRAPHY | Facility: HOSPITAL | Age: 71
Discharge: HOME | End: 2021-06-29
Payer: MEDICARE

## 2021-06-29 VITALS — WEIGHT: 165 LBS | HEIGHT: 65 IN | BODY MASS INDEX: 27.49 KG/M2

## 2021-06-29 DIAGNOSIS — Z12.31 ENCOUNTER FOR SCREENING MAMMOGRAM FOR MALIGNANT NEOPLASM OF BREAST: ICD-10-CM

## 2021-06-29 PROCEDURE — 77063 BREAST TOMOSYNTHESIS BI: CPT

## 2021-06-29 PROCEDURE — 77067 SCR MAMMO BI INCL CAD: CPT

## 2021-07-12 ENCOUNTER — APPOINTMENT (OUTPATIENT)
Dept: LAB | Facility: CLINIC | Age: 71
End: 2021-07-12
Payer: MEDICARE

## 2021-07-12 DIAGNOSIS — Z13.220 SCREENING FOR HYPERLIPIDEMIA: ICD-10-CM

## 2021-07-12 DIAGNOSIS — Z13.1 SCREENING FOR DIABETES MELLITUS: ICD-10-CM

## 2021-07-12 DIAGNOSIS — E03.8 SUBCLINICAL HYPOTHYROIDISM: ICD-10-CM

## 2021-07-12 DIAGNOSIS — E55.9 VITAMIN D DEFICIENCY: ICD-10-CM

## 2021-07-12 DIAGNOSIS — Z13.0 SCREENING FOR DEFICIENCY ANEMIA: ICD-10-CM

## 2021-07-12 LAB
25(OH)D3 SERPL-MCNC: 56.5 NG/ML (ref 30–100)
ALBUMIN SERPL BCP-MCNC: 3.8 G/DL (ref 3.5–5)
ALP SERPL-CCNC: 65 U/L (ref 46–116)
ALT SERPL W P-5'-P-CCNC: 39 U/L (ref 12–78)
ANION GAP SERPL CALCULATED.3IONS-SCNC: 4 MMOL/L (ref 4–13)
AST SERPL W P-5'-P-CCNC: 26 U/L (ref 5–45)
BASOPHILS # BLD AUTO: 0.04 THOUSANDS/ΜL (ref 0–0.1)
BASOPHILS NFR BLD AUTO: 1 % (ref 0–1)
BILIRUB SERPL-MCNC: 0.53 MG/DL (ref 0.2–1)
BUN SERPL-MCNC: 27 MG/DL (ref 5–25)
CALCIUM SERPL-MCNC: 9.6 MG/DL (ref 8.3–10.1)
CHLORIDE SERPL-SCNC: 106 MMOL/L (ref 100–108)
CHOLEST SERPL-MCNC: 144 MG/DL (ref 50–200)
CO2 SERPL-SCNC: 26 MMOL/L (ref 21–32)
CREAT SERPL-MCNC: 1.13 MG/DL (ref 0.6–1.3)
EOSINOPHIL # BLD AUTO: 0.22 THOUSAND/ΜL (ref 0–0.61)
EOSINOPHIL NFR BLD AUTO: 3 % (ref 0–6)
ERYTHROCYTE [DISTWIDTH] IN BLOOD BY AUTOMATED COUNT: 13.3 % (ref 11.6–15.1)
GFR SERPL CREATININE-BSD FRML MDRD: 49 ML/MIN/1.73SQ M
GLUCOSE P FAST SERPL-MCNC: 84 MG/DL (ref 65–99)
HCT VFR BLD AUTO: 41.7 % (ref 34.8–46.1)
HDLC SERPL-MCNC: 65 MG/DL
HGB BLD-MCNC: 12.9 G/DL (ref 11.5–15.4)
IMM GRANULOCYTES # BLD AUTO: 0.02 THOUSAND/UL (ref 0–0.2)
IMM GRANULOCYTES NFR BLD AUTO: 0 % (ref 0–2)
LDLC SERPL CALC-MCNC: 59 MG/DL (ref 0–100)
LYMPHOCYTES # BLD AUTO: 3.41 THOUSANDS/ΜL (ref 0.6–4.47)
LYMPHOCYTES NFR BLD AUTO: 48 % (ref 14–44)
MCH RBC QN AUTO: 31.1 PG (ref 26.8–34.3)
MCHC RBC AUTO-ENTMCNC: 30.9 G/DL (ref 31.4–37.4)
MCV RBC AUTO: 101 FL (ref 82–98)
MONOCYTES # BLD AUTO: 0.73 THOUSAND/ΜL (ref 0.17–1.22)
MONOCYTES NFR BLD AUTO: 10 % (ref 4–12)
NEUTROPHILS # BLD AUTO: 2.66 THOUSANDS/ΜL (ref 1.85–7.62)
NEUTS SEG NFR BLD AUTO: 38 % (ref 43–75)
NRBC BLD AUTO-RTO: 0 /100 WBCS
PLATELET # BLD AUTO: 286 THOUSANDS/UL (ref 149–390)
PMV BLD AUTO: 10.2 FL (ref 8.9–12.7)
POTASSIUM SERPL-SCNC: 4.1 MMOL/L (ref 3.5–5.3)
PROT SERPL-MCNC: 7.7 G/DL (ref 6.4–8.2)
RBC # BLD AUTO: 4.15 MILLION/UL (ref 3.81–5.12)
SODIUM SERPL-SCNC: 136 MMOL/L (ref 136–145)
T4 FREE SERPL-MCNC: 0.86 NG/DL (ref 0.76–1.46)
TRIGL SERPL-MCNC: 100 MG/DL
TSH SERPL DL<=0.05 MIU/L-ACNC: 3.93 UIU/ML (ref 0.36–3.74)
WBC # BLD AUTO: 7.08 THOUSAND/UL (ref 4.31–10.16)

## 2021-07-12 PROCEDURE — 36415 COLL VENOUS BLD VENIPUNCTURE: CPT

## 2021-07-12 PROCEDURE — 82306 VITAMIN D 25 HYDROXY: CPT

## 2021-07-12 PROCEDURE — 84443 ASSAY THYROID STIM HORMONE: CPT

## 2021-07-12 PROCEDURE — 80053 COMPREHEN METABOLIC PANEL: CPT

## 2021-07-12 PROCEDURE — 80061 LIPID PANEL: CPT

## 2021-07-12 PROCEDURE — 85025 COMPLETE CBC W/AUTO DIFF WBC: CPT

## 2021-07-12 PROCEDURE — 84439 ASSAY OF FREE THYROXINE: CPT

## 2021-07-26 DIAGNOSIS — M25.559 HIP PAIN: Primary | ICD-10-CM

## 2021-08-11 DIAGNOSIS — F51.01 PRIMARY INSOMNIA: ICD-10-CM

## 2021-08-11 RX ORDER — ZOLPIDEM TARTRATE 10 MG/1
TABLET ORAL
Qty: 90 TABLET | Refills: 0 | Status: SHIPPED | OUTPATIENT
Start: 2021-08-11 | End: 2022-01-20 | Stop reason: SDUPTHER

## 2021-08-16 ENCOUNTER — OFFICE VISIT (OUTPATIENT)
Dept: FAMILY MEDICINE CLINIC | Facility: CLINIC | Age: 71
End: 2021-08-16
Payer: MEDICARE

## 2021-08-16 VITALS
WEIGHT: 165 LBS | HEIGHT: 65 IN | TEMPERATURE: 96.8 F | DIASTOLIC BLOOD PRESSURE: 78 MMHG | SYSTOLIC BLOOD PRESSURE: 118 MMHG | HEART RATE: 80 BPM | BODY MASS INDEX: 27.49 KG/M2 | OXYGEN SATURATION: 98 %

## 2021-08-16 DIAGNOSIS — Z00.00 ENCOUNTER FOR MEDICARE ANNUAL WELLNESS EXAM: Primary | ICD-10-CM

## 2021-08-16 DIAGNOSIS — I10 ESSENTIAL HYPERTENSION: ICD-10-CM

## 2021-08-16 DIAGNOSIS — E78.01 FAMILIAL HYPERCHOLESTEROLEMIA: ICD-10-CM

## 2021-08-16 DIAGNOSIS — E03.8 SUBCLINICAL HYPOTHYROIDISM: ICD-10-CM

## 2021-08-16 DIAGNOSIS — M85.88 OSTEOPENIA OF OTHER SITE: ICD-10-CM

## 2021-08-16 DIAGNOSIS — Z78.0 ASYMPTOMATIC POSTMENOPAUSAL STATE: ICD-10-CM

## 2021-08-16 DIAGNOSIS — M15.9 PRIMARY OSTEOARTHRITIS INVOLVING MULTIPLE JOINTS: ICD-10-CM

## 2021-08-16 DIAGNOSIS — Z13.31 DEPRESSION SCREENING NEGATIVE: ICD-10-CM

## 2021-08-16 PROBLEM — B35.1 TOENAIL FUNGUS: Status: RESOLVED | Noted: 2018-12-19 | Resolved: 2021-08-16

## 2021-08-16 PROCEDURE — 99214 OFFICE O/P EST MOD 30 MIN: CPT | Performed by: NURSE PRACTITIONER

## 2021-08-16 PROCEDURE — 1123F ACP DISCUSS/DSCN MKR DOCD: CPT | Performed by: NURSE PRACTITIONER

## 2021-08-16 RX ORDER — AMLODIPINE BESYLATE AND BENAZEPRIL HYDROCHLORIDE 10; 40 MG/1; MG/1
1 CAPSULE ORAL DAILY
Qty: 90 CAPSULE | Refills: 1 | Status: SHIPPED | OUTPATIENT
Start: 2021-08-16 | End: 2022-01-24

## 2021-08-16 RX ORDER — ROSUVASTATIN CALCIUM 10 MG/1
10 TABLET, COATED ORAL DAILY
Qty: 90 TABLET | Refills: 1 | Status: SHIPPED | OUTPATIENT
Start: 2021-08-16 | End: 2022-01-24

## 2021-08-16 NOTE — PROGRESS NOTES
Assessment/Plan:    Problem List Items Addressed This Visit     Osteoarthrosis    Familial hypercholesterolemia    Relevant Medications    rosuvastatin (CRESTOR) 10 MG tablet    Other Relevant Orders    Lipid Panel with Direct LDL reflex    Essential hypertension    Relevant Medications    amLODIPine-benazepril (LOTREL) 10-40 MG per capsule    Subclinical hypothyroidism    Relevant Orders    TSH, 3rd generation    T4, free      Other Visit Diagnoses     Encounter for Medicare annual wellness exam    -  Primary    Asymptomatic postmenopausal state        Osteopenia of other site        Relevant Orders    DXA bone density spine hip and pelvis    Depression screening negative               Diagnoses and all orders for this visit:    Encounter for Medicare annual wellness exam    Asymptomatic postmenopausal state    Osteopenia of other site  -     DXA bone density spine hip and pelvis; Future    Subclinical hypothyroidism  -     TSH, 3rd generation; Future  -     T4, free; Future    Essential hypertension  -     amLODIPine-benazepril (LOTREL) 10-40 MG per capsule; Take 1 capsule by mouth daily    Primary osteoarthritis involving multiple joints    Familial hypercholesterolemia  -     rosuvastatin (CRESTOR) 10 MG tablet; Take 1 tablet (10 mg total) by mouth daily  -     Lipid Panel with Direct LDL reflex; Future    Depression screening negative        No problem-specific Assessment & Plan notes found for this encounter  Subjective:      Patient ID: Leopoldo Hsieh is a 65 y o  female  Presents for AWV and c/o right hip pain  Hip Pain   The incident occurred more than 1 week ago  The incident occurred at home  There was no injury mechanism  The pain is present in the right hip  The quality of the pain is described as aching  The pain is at a severity of 6/10  The pain has been improving since onset   Pertinent negatives include no inability to bear weight, loss of motion, loss of sensation, muscle weakness, numbness or tingling  The symptoms are aggravated by palpation  She has tried NSAIDs and acetaminophen for the symptoms  The treatment provided moderate relief  Hyperlipidemia  This is a chronic problem  The problem is controlled  She has no history of chronic renal disease, diabetes, hypothyroidism, liver disease, obesity or nephrotic syndrome  There are no known factors aggravating her hyperlipidemia  Associated symptoms include myalgias  Pertinent negatives include no chest pain, focal sensory loss, focal weakness, leg pain or shortness of breath  Current antihyperlipidemic treatment includes statins  The current treatment provides significant improvement of lipids  There are no compliance problems  Risk factors for coronary artery disease include dyslipidemia, hypertension and post-menopausal    Hypertension  This is a chronic problem  The problem is controlled  Pertinent negatives include no anxiety, blurred vision, chest pain, headaches, neck pain, orthopnea, palpitations, peripheral edema, PND or shortness of breath  There are no associated agents to hypertension  Risk factors for coronary artery disease include post-menopausal state  Past treatments include calcium channel blockers and ACE inhibitors  The current treatment provides significant improvement  There are no compliance problems  There is no history of angina, kidney disease, CAD/MI, CVA, heart failure, left ventricular hypertrophy, PVD or retinopathy  Identifiable causes of hypertension include a thyroid problem  There is no history of chronic renal disease, a hypertension causing med or sleep apnea  Thyroid Problem  Presents for follow-up visit  Patient reports no palpitations  The symptoms have been stable  Her past medical history is significant for hyperlipidemia  There is no history of diabetes or heart failure         The following portions of the patient's history were reviewed and updated as appropriate:   She has a past medical history of Arthritis, Cataract, Hallux valgus of right foot, Hyperlipidemia, Hypertension, RLL pneumonia, Skin rash (11/30/2016), and Wears glasses  ,  does not have any pertinent problems on file  ,   has a past surgical history that includes Bladder suspension; Eye surgery (Right); Colonoscopy; [place] tooth extraction; and pr corrj hallux valgus w/sesmdc w/dist metar osteot (Right, 9/29/2020)  ,  family history includes Colon cancer in her family; Colon cancer (age of onset: 67) in her mother; Coronary artery disease in her family; Diabetes in her father and mother; Diabetes type II in her family; Heart disease in her father and mother; No Known Problems in her daughter, maternal grandfather, maternal grandmother, paternal grandfather, paternal grandmother, sister, and son ,   reports that she has never smoked  She has never used smokeless tobacco  She reports current alcohol use  She reports that she does not use drugs  ,  has No Known Allergies     Current Outpatient Medications   Medication Sig Dispense Refill    amLODIPine-benazepril (LOTREL) 10-40 MG per capsule Take 1 capsule by mouth daily 90 capsule 1    Calcium Carbonate-Vitamin D 600-200 MG-UNIT CAPS Take 1 capsule by mouth daily      cholecalciferol (VITAMIN D3) 1,000 units tablet Take 1,000 Units by mouth daily      cyanocobalamin (VITAMIN B-12) 500 mcg tablet Take 500 mcg by mouth daily      diclofenac sodium (VOLTAREN) 50 mg EC tablet Take 1 tablet (50 mg total) by mouth 2 (two) times a day Take with food (Patient taking differently: Take 50 mg by mouth 2 (two) times a day Take with food- pt taking daily) 180 tablet 2    rosuvastatin (CRESTOR) 10 MG tablet Take 1 tablet (10 mg total) by mouth daily 90 tablet 1    zolpidem (AMBIEN) 10 mg tablet TAKE 1 TABLET BY MOUTH  EVERY NIGHT AT BEDTIME AS  NEEDED FOR INSOMNIA 90 tablet 0    diclofenac sodium (VOLTAREN) 1 % APPLY 2 GRAMS TOPICALLY 4  (FOUR) TIMES A DAY (Patient not taking: Reported on 8/16/2021) 600 g 2     No current facility-administered medications for this visit  Review of Systems   Constitutional: Negative  HENT: Negative  Eyes: Negative  Negative for blurred vision  Respiratory: Negative  Negative for shortness of breath  Cardiovascular: Negative  Negative for chest pain, palpitations, orthopnea and PND  Gastrointestinal: Negative  Endocrine: Negative  Genitourinary: Negative  Musculoskeletal: Positive for arthralgias and myalgias  Negative for neck pain  Skin: Negative  Allergic/Immunologic: Negative  Neurological: Negative  Negative for tingling, focal weakness, numbness and headaches  Hematological: Negative  Psychiatric/Behavioral: Negative  Objective:  Vitals:    08/16/21 0824   BP: 118/78   BP Location: Left arm   Patient Position: Sitting   Cuff Size: Large   Pulse: 80   Temp: (!) 96 8 °F (36 °C)   TempSrc: Tympanic   SpO2: 98%   Weight: 74 8 kg (165 lb)   Height: 5' 5" (1 651 m)     Body mass index is 27 46 kg/m²  Physical Exam  Vitals and nursing note reviewed  Constitutional:       Appearance: Normal appearance  She is well-developed  HENT:      Head: Normocephalic and atraumatic  Right Ear: Tympanic membrane, ear canal and external ear normal       Left Ear: Tympanic membrane, ear canal and external ear normal       Nose: Nose normal       Mouth/Throat:      Mouth: Mucous membranes are moist       Pharynx: Uvula midline  Eyes:      General: Lids are normal       Conjunctiva/sclera: Conjunctivae normal       Pupils: Pupils are equal, round, and reactive to light  Neck:      Thyroid: No thyroid mass or thyromegaly  Vascular: No JVD  Trachea: Trachea and phonation normal    Cardiovascular:      Rate and Rhythm: Normal rate and regular rhythm  Pulses: Normal pulses  Heart sounds: Normal heart sounds, S1 normal and S2 normal  No murmur heard  No friction rub  No gallop      Pulmonary: Effort: Pulmonary effort is normal       Breath sounds: Normal breath sounds  Abdominal:      General: Bowel sounds are normal       Palpations: Abdomen is soft  Tenderness: There is no abdominal tenderness  Genitourinary:     Comments: Deferred   Musculoskeletal:         General: Normal range of motion  Cervical back: Full passive range of motion without pain, normal range of motion and neck supple  Right lower leg: No edema  Left lower leg: No edema  Legs:    Lymphadenopathy:      Head:      Right side of head: No submental, submandibular, tonsillar, preauricular, posterior auricular or occipital adenopathy  Left side of head: No submental, submandibular, tonsillar, preauricular, posterior auricular or occipital adenopathy  Cervical: No cervical adenopathy  Skin:     General: Skin is warm and dry  Capillary Refill: Capillary refill takes less than 2 seconds  Neurological:      General: No focal deficit present  Mental Status: She is alert and oriented to person, place, and time  Cranial Nerves: Cranial nerves are intact  No cranial nerve deficit  Sensory: Sensation is intact  Motor: Motor function is intact  Coordination: Coordination is intact  Gait: Gait is intact  Deep Tendon Reflexes: Reflexes are normal and symmetric  Psychiatric:         Attention and Perception: Attention and perception normal          Mood and Affect: Mood and affect normal          Speech: Speech normal          Behavior: Behavior normal  Behavior is cooperative  Thought Content:  Thought content normal          Cognition and Memory: Cognition normal          Judgment: Judgment normal

## 2021-08-16 NOTE — PATIENT INSTRUCTIONS
Medicare Preventive Visit Patient Instructions  Thank you for completing your Welcome to Medicare Visit or Medicare Annual Wellness Visit today  Your next wellness visit will be due in one year (8/17/2022)  The screening/preventive services that you may require over the next 5-10 years are detailed below  Some tests may not apply to you based off risk factors and/or age  Screening tests ordered at today's visit but not completed yet may show as past due  Also, please note that scanned in results may not display below  Preventive Screenings:  Service Recommendations Previous Testing/Comments   Colorectal Cancer Screening  * Colonoscopy    * Fecal Occult Blood Test (FOBT)/Fecal Immunochemical Test (FIT)  * Fecal DNA/Cologuard Test  * Flexible Sigmoidoscopy Age: 54-65 years old   Colonoscopy: every 10 years (may be performed more frequently if at higher risk)  OR  FOBT/FIT: every 1 year  OR  Cologuard: every 3 years  OR  Sigmoidoscopy: every 5 years  Screening may be recommended earlier than age 48 if at higher risk for colorectal cancer  Also, an individualized decision between you and your healthcare provider will decide whether screening between the ages of 74-80 would be appropriate  Colonoscopy: 03/11/2019  FOBT/FIT: Not on file  Cologuard: Not on file  Sigmoidoscopy: Not on file    Screening Current     Breast Cancer Screening Age: 36 years old  Frequency: every 1-2 years  Not required if history of left and right mastectomy Mammogram: 06/29/2021    Screening Current   Cervical Cancer Screening Between the ages of 21-29, pap smear recommended once every 3 years  Between the ages of 33-67, can perform pap smear with HPV co-testing every 5 years     Recommendations may differ for women with a history of total hysterectomy, cervical cancer, or abnormal pap smears in past  Pap Smear: 04/17/2019    Screening Not Indicated   Hepatitis C Screening Once for adults born between 1945 and 1965  More frequently in patients at high risk for Hepatitis C Hep C Antibody: 01/03/2017    Screening Current   Diabetes Screening 1-2 times per year if you're at risk for diabetes or have pre-diabetes Fasting glucose: 84 mg/dL   A1C: No results in last 5 years    Screening Current   Cholesterol Screening Once every 5 years if you don't have a lipid disorder  May order more often based on risk factors  Lipid panel: 07/12/2021    Screening Not Indicated  History Lipid Disorder     Other Preventive Screenings Covered by Medicare:  1  Abdominal Aortic Aneurysm (AAA) Screening: covered once if your at risk  You're considered to be at risk if you have a family history of AAA  2  Lung Cancer Screening: covers low dose CT scan once per year if you meet all of the following conditions: (1) Age 50-69; (2) No signs or symptoms of lung cancer; (3) Current smoker or have quit smoking within the last 15 years; (4) You have a tobacco smoking history of at least 30 pack years (packs per day multiplied by number of years you smoked); (5) You get a written order from a healthcare provider  3  Glaucoma Screening: covered annually if you're considered high risk: (1) You have diabetes OR (2) Family history of glaucoma OR (3)  aged 48 and older OR (3)  American aged 72 and older  3  Osteoporosis Screening: covered every 2 years if you meet one of the following conditions: (1) You're estrogen deficient and at risk for osteoporosis based off medical history and other findings; (2) Have a vertebral abnormality; (3) On glucocorticoid therapy for more than 3 months; (4) Have primary hyperparathyroidism; (5) On osteoporosis medications and need to assess response to drug therapy  · Last bone density test (DXA Scan): 02/27/2019   5  HIV Screening: covered annually if you're between the age of 15-65  Also covered annually if you are younger than 13 and older than 72 with risk factors for HIV infection   For pregnant patients, it is covered up to 3 times per pregnancy  Immunizations:  Immunization Recommendations   Influenza Vaccine Annual influenza vaccination during flu season is recommended for all persons aged >= 6 months who do not have contraindications   Pneumococcal Vaccine (Prevnar and Pneumovax)  * Prevnar = PCV13  * Pneumovax = PPSV23   Adults 25-60 years old: 1-3 doses may be recommended based on certain risk factors  Adults 72 years old: Prevnar (PCV13) vaccine recommended followed by Pneumovax (PPSV23) vaccine  If already received PPSV23 since turning 65, then PCV13 recommended at least one year after PPSV23 dose  Hepatitis B Vaccine 3 dose series if at intermediate or high risk (ex: diabetes, end stage renal disease, liver disease)   Tetanus (Td) Vaccine - COST NOT COVERED BY MEDICARE PART B Following completion of primary series, a booster dose should be given every 10 years to maintain immunity against tetanus  Td may also be given as tetanus wound prophylaxis  Tdap Vaccine - COST NOT COVERED BY MEDICARE PART B Recommended at least once for all adults  For pregnant patients, recommended with each pregnancy  Shingles Vaccine (Shingrix) - COST NOT COVERED BY MEDICARE PART B  2 shot series recommended in those aged 48 and above     Health Maintenance Due:      Topic Date Due    DXA SCAN  02/27/2021    Breast Cancer Screening: Mammogram  06/29/2023    Colorectal Cancer Screening  03/11/2029    Hepatitis C Screening  Completed     Immunizations Due:      Topic Date Due    Influenza Vaccine (1) 09/01/2021     Advance Directives   What are advance directives? Advance directives are legal documents that state your wishes and plans for medical care  These plans are made ahead of time in case you lose your ability to make decisions for yourself  Advance directives can apply to any medical decision, such as the treatments you want, and if you want to donate organs  What are the types of advance directives?   There are many types of advance directives, and each state has rules about how to use them  You may choose a combination of any of the following:  · Living will: This is a written record of the treatment you want  You can also choose which treatments you do not want, which to limit, and which to stop at a certain time  This includes surgery, medicine, IV fluid, and tube feedings  · Durable power of  for healthcare [facility]): This is a written record that states who you want to make healthcare choices for you when you are unable to make them for yourself  This person, called a proxy, is usually a family member or a friend  You may choose more than 1 proxy  · Do not resuscitate (DNR) order:  A DNR order is used in case your heart stops beating or you stop breathing  It is a request not to have certain forms of treatment, such as CPR  A DNR order may be included in other types of advance directives  · Medical directive: This covers the care that you want if you are in a coma, near death, or unable to make decisions for yourself  You can list the treatments you want for each condition  Treatment may include pain medicine, surgery, blood transfusions, dialysis, IV or tube feedings, and a ventilator (breathing machine)  · Values history: This document has questions about your views, beliefs, and how you feel and think about life  This information can help others choose the care that you would choose  Why are advance directives important? An advance directive helps you control your care  Although spoken wishes may be used, it is better to have your wishes written down  Spoken wishes can be misunderstood, or not followed  Treatments may be given even if you do not want them  An advance directive may make it easier for your family to make difficult choices about your care     Weight Management   Why it is important to manage your weight:  Being overweight increases your risk of health conditions such as heart disease, high blood pressure, type 2 diabetes, and certain types of cancer  It can also increase your risk for osteoarthritis, sleep apnea, and other respiratory problems  Aim for a slow, steady weight loss  Even a small amount of weight loss can lower your risk of health problems  How to lose weight safely:  A safe and healthy way to lose weight is to eat fewer calories and get regular exercise  You can lose up about 1 pound a week by decreasing the number of calories you eat by 500 calories each day  Healthy meal plan for weight management:  A healthy meal plan includes a variety of foods, contains fewer calories, and helps you stay healthy  A healthy meal plan includes the following:  · Eat whole-grain foods more often  A healthy meal plan should contain fiber  Fiber is the part of grains, fruits, and vegetables that is not broken down by your body  Whole-grain foods are healthy and provide extra fiber in your diet  Some examples of whole-grain foods are whole-wheat breads and pastas, oatmeal, brown rice, and bulgur  · Eat a variety of vegetables every day  Include dark, leafy greens such as spinach, kale, mundo greens, and mustard greens  Eat yellow and orange vegetables such as carrots, sweet potatoes, and winter squash  · Eat a variety of fruits every day  Choose fresh or canned fruit (canned in its own juice or light syrup) instead of juice  Fruit juice has very little or no fiber  · Eat low-fat dairy foods  Drink fat-free (skim) milk or 1% milk  Eat fat-free yogurt and low-fat cottage cheese  Try low-fat cheeses such as mozzarella and other reduced-fat cheeses  · Choose meat and other protein foods that are low in fat  Choose beans or other legumes such as split peas or lentils  Choose fish, skinless poultry (chicken or turkey), or lean cuts of red meat (beef or pork)  Before you cook meat or poultry, cut off any visible fat  · Use less fat and oil  Try baking foods instead of frying them   Add less fat, such as margarine, sour cream, regular salad dressing and mayonnaise to foods  Eat fewer high-fat foods  Some examples of high-fat foods include french fries, doughnuts, ice cream, and cakes  · Eat fewer sweets  Limit foods and drinks that are high in sugar  This includes candy, cookies, regular soda, and sweetened drinks  Exercise:  Exercise at least 30 minutes per day on most days of the week  Some examples of exercise include walking, biking, dancing, and swimming  You can also fit in more physical activity by taking the stairs instead of the elevator or parking farther away from stores  Ask your healthcare provider about the best exercise plan for you  © Copyright [employer] 2018 Information is for End User's use only and may not be sold, redistributed or otherwise used for commercial purposes   All illustrations and images included in CareNotes® are the copyrighted property of A D [employer] M , [employer]  or [address] [place] [address]

## 2021-08-16 NOTE — PROGRESS NOTES
Assessment and Plan:     Problem List Items Addressed This Visit     Osteoarthrosis    Familial hypercholesterolemia    Relevant Medications    rosuvastatin (CRESTOR) 10 MG tablet    Other Relevant Orders    Lipid Panel with Direct LDL reflex    Essential hypertension    Relevant Medications    amLODIPine-benazepril (LOTREL) 10-40 MG per capsule    Subclinical hypothyroidism    Relevant Orders    TSH, 3rd generation    T4, free      Other Visit Diagnoses     Encounter for Medicare annual wellness exam    -  Primary    Asymptomatic postmenopausal state        Osteopenia of other site        Relevant Orders    DXA bone density spine hip and pelvis    Depression screening negative            BMI Counseling: Body mass index is 27 46 kg/m²  The BMI is above normal  Nutrition recommendations include decreasing portion sizes, consuming healthier snacks, limiting drinks that contain sugar, moderation in carbohydrate intake and reducing intake of cholesterol  Exercise recommendations include exercising 3-5 times per week  No pharmacotherapy was ordered  Depression Screening and Follow-up Plan: Patient's depression screening was positive with a PHQ-2 score of 0  Clincally patient does not have depression  No treatment is required  Preventive health issues were discussed with patient, and age appropriate screening tests were ordered as noted in patient's After Visit Summary  Personalized health advice and appropriate referrals for health education or preventive services given if needed, as noted in patient's After Visit Summary       History of Present Illness:     Patient presents for Medicare Annual Wellness visit    Patient Care Team:  Aubrey Zayas as PCP - General (Internal Medicine)  Armani Oconnell MD     Problem List:     Patient Active Problem List   Diagnosis    Osteoarthrosis    Familial hypercholesterolemia    Foot pain, bilateral    Essential hypertension    Primary insomnia    Actinic keratoses    Allergic rhinitis    Awakens from sleep at night    Other dyschromia    Disease of sebaceous glands    Subclinical hypothyroidism    Trigger finger    Bunion of great toe of right foot    Right hip pain    Varicose veins of both legs with edema      Past Medical and Surgical History:     Past Medical History:   Diagnosis Date    Arthritis     Cataract     starting    Hallux valgus of right foot     Hyperlipidemia     Hypertension     RLL pneumonia     Last Assessed:11/4/14    Skin rash 11/30/2016    Wears glasses      Past Surgical History:   Procedure Laterality Date    BLADDER SUSPENSION      COLONOSCOPY      EYE SURGERY Right     Retinal Detachment complex    VT CORRJ HALLUX VALGUS W/SESMDC W/DIST METAR OSTEOT Right 9/29/2020    Procedure: Dierdre Loss;  Surgeon: Arvin Sandifer, DPM;  Location: [facility];  Service: Podiatry    WISDOM TOOTH EXTRACTION        Family History:     Family History   Problem Relation Age of Onset    Heart disease Mother         Coronary disease    Colon cancer Mother 67    Diabetes Mother     Heart disease Father         Coronary disease    Diabetes Father     Colon cancer Family     Coronary artery disease Family     Diabetes type II Family     No Known Problems Sister     No Known Problems Daughter     No Known Problems Maternal Grandmother     No Known Problems Maternal Grandfather     No Known Problems Paternal Grandmother     No Known Problems Paternal Grandfather     No Known Problems Son       Social History:     Social History     Socioeconomic History    Marital status: /Civil Union     Spouse name: None    Number of children: None    Years of education: None    Highest education level: None   Occupational History    Occupation: RETIRED   Tobacco Use    Smoking status: Never Smoker    Smokeless tobacco: Never Used   Vaping Use    Vaping Use: Never used   Substance and Sexual Activity    Alcohol use: Yes     Comment: beer    Drug use: No    Sexual activity: None   Other Topics Concern    None   Social History Narrative        Retired-Teacher     Social Determinants of Health     Financial Resource Strain:     Difficulty of Paying Living Expenses:    Food Insecurity:     Worried About Running Out of Food in the Last Year:     Ran Out of Food in the Last Year:    Transportation Needs:     Lack of Transportation (Medical):      Lack of Transportation (Non-Medical):    Physical Activity:     Days of Exercise per Week:     Minutes of Exercise per Session:    Stress:     Feeling of Stress :    Social Connections:     Frequency of Communication with Friends and Family:     Frequency of Social Gatherings with Friends and Family:     Attends Taoism Services:     Active Member of Clubs or Organizations:     Attends Club or Organization Meetings:     Marital Status:    Intimate Partner Violence:     Fear of Current or Ex-Partner:     Emotionally Abused:     Physically Abused:     Sexually Abused:       Medications and Allergies:     Current Outpatient Medications   Medication Sig Dispense Refill    amLODIPine-benazepril (LOTREL) 10-40 MG per capsule Take 1 capsule by mouth daily 90 capsule 1    Calcium Carbonate-Vitamin D 600-200 MG-UNIT CAPS Take 1 capsule by mouth daily      cholecalciferol (VITAMIN D3) 1,000 units tablet Take 1,000 Units by mouth daily      cyanocobalamin (VITAMIN B-12) 500 mcg tablet Take 500 mcg by mouth daily      diclofenac sodium (VOLTAREN) 50 mg EC tablet Take 1 tablet (50 mg total) by mouth 2 (two) times a day Take with food (Patient taking differently: Take 50 mg by mouth 2 (two) times a day Take with food- pt taking daily) 180 tablet 2    rosuvastatin (CRESTOR) 10 MG tablet Take 1 tablet (10 mg total) by mouth daily 90 tablet 1    zolpidem (AMBIEN) 10 mg tablet TAKE 1 TABLET BY MOUTH  EVERY NIGHT AT BEDTIME AS  NEEDED FOR INSOMNIA 90 tablet 0  diclofenac sodium (VOLTAREN) 1 % APPLY 2 GRAMS TOPICALLY 4  (FOUR) TIMES A DAY (Patient not taking: Reported on 8/16/2021) 600 g 2     No current facility-administered medications for this visit  No Known Allergies   Immunizations:     Immunization History   Administered Date(s) Administered    INFLUENZA 11/09/2015, 11/16/2016, 10/10/2017    Influenza Split High Dose Preservative Free IM 10/21/2013, 01/08/2015, 11/09/2015, 11/16/2016, 10/10/2017    Influenza, high dose seasonal 0 7 mL 09/25/2018, 10/24/2019, 09/21/2020    Influenza, seasonal, injectable 09/26/2012    Pneumococcal Conjugate 13-Valent 01/08/2015    Pneumococcal Polysaccharide PPV23 11/14/2013    SARS-CoV-2 / COVID-19 mRNA IM (Moderna) 04/01/2021, 04/29/2021    Tdap 11/09/2015      Health Maintenance:         Topic Date Due    DXA SCAN  02/27/2021    Breast Cancer Screening: Mammogram  06/29/2023    Colorectal Cancer Screening  03/11/2029    Hepatitis C Screening  Completed         Topic Date Due    Influenza Vaccine (1) 09/01/2021      Medicare Health Risk Assessment:     /78 (BP Location: Left arm, Patient Position: Sitting, Cuff Size: Large)   Pulse 80   Temp (!) 96 8 °F (36 °C) (Tympanic)   Ht 5' 5" (1 651 m)   Wt 74 8 kg (165 lb)   SpO2 98%   BMI 27 46 kg/m²      Iram Rahman is here for her Subsequent Wellness visit  Last Medicare Wellness visit information reviewed, patient interviewed and updates made to the record today  Health Risk Assessment:   Patient rates overall health as very good  Patient is very satisfied with their life  Eyesight was rated as slightly worse  Hearing was rated as same  Patient feels that their emotional and mental health rating is same  Patients states they are never, rarely angry  Patient states they are never, rarely unusually tired/fatigued  Pain experienced in the last 7 days has been none  Patient states that she has experienced weight loss or gain in last 6 months       Depression Screening:   PHQ-2 Score: 0      Fall Risk Screening: In the past year, patient has experienced: no history of falling in past year      Urinary Incontinence Screening:   Patient has not leaked urine accidently in the last six months  Home Safety:  Patient does not have trouble with stairs inside or outside of their home  Patient has working smoke alarms and has working carbon monoxide detector  Home safety hazards include: none  Nutrition:   Current diet is Regular  Medications:   Patient is currently taking over-the-counter supplements  OTC medications include: see medication list  Patient is able to manage medications  Activities of Daily Living (ADLs)/Instrumental Activities of Daily Living (IADLs):   Walk and transfer into and out of bed and chair?: Yes  Dress and groom yourself?: Yes    Bathe or shower yourself?: Yes    Feed yourself? Yes  Do your laundry/housekeeping?: Yes  Manage your money, pay your bills and track your expenses?: Yes  Make your own meals?: Yes    Do your own shopping?: Yes    Previous Hospitalizations:   Any hospitalizations or ED visits within the last 12 months?: No      Advance Care Planning:   Living will: Yes    Durable POA for healthcare:  Yes    Advanced directive: Yes    Advanced directive counseling given: Yes    Five wishes given: Yes    Patient declined ACP directive: No    End of Life Decisions reviewed with patient: Yes    Provider agrees with end of life decisions: Yes      Cognitive Screening:   Provider or family/friend/caregiver concerned regarding cognition?: No    PREVENTIVE SCREENINGS      Cardiovascular Screening:    General: Screening Not Indicated and History Lipid Disorder      Diabetes Screening:     General: Screening Current      Colorectal Cancer Screening:     General: Screening Current      Breast Cancer Screening:     General: Screening Current      Cervical Cancer Screening:    General: Screening Not Indicated      Abdominal Aortic Aneurysm (AAA) Screening:        General: Screening Not Indicated      Lung Cancer Screening:     General: Screening Not Indicated      Hepatitis C Screening:    General: Screening Current    Screening, Brief Intervention, and Referral to Treatment (SBIRT)    Screening      Single Item Drug Screening:  How often have you used an illegal drug (including marijuana) or a prescription medication for non-medical reasons in the past year? never    Single Item Drug Screen Score: 0  Interpretation: Negative screen for possible drug use disorder    Other Counseling Topics:   Car/seat belt/driving safety, skin self-exam, sunscreen and calcium and vitamin D intake and regular weightbearing exercise         Fabricio Hensley

## 2021-08-22 DIAGNOSIS — M79.671 FOOT PAIN, BILATERAL: ICD-10-CM

## 2021-08-22 DIAGNOSIS — M79.672 FOOT PAIN, BILATERAL: ICD-10-CM

## 2021-10-13 ENCOUNTER — IMMUNIZATIONS (OUTPATIENT)
Dept: FAMILY MEDICINE CLINIC | Facility: CLINIC | Age: 71
End: 2021-10-13
Payer: MEDICARE

## 2021-10-13 DIAGNOSIS — Z23 ENCOUNTER FOR IMMUNIZATION: Primary | ICD-10-CM

## 2021-10-13 PROCEDURE — G0008 ADMIN INFLUENZA VIRUS VAC: HCPCS

## 2021-10-13 PROCEDURE — 90662 IIV NO PRSV INCREASED AG IM: CPT

## 2021-11-24 DIAGNOSIS — M79.671 FOOT PAIN, BILATERAL: ICD-10-CM

## 2021-11-24 DIAGNOSIS — M79.672 FOOT PAIN, BILATERAL: ICD-10-CM

## 2021-11-26 ENCOUNTER — TELEPHONE (OUTPATIENT)
Dept: FAMILY MEDICINE CLINIC | Facility: CLINIC | Age: 71
End: 2021-11-26

## 2021-11-29 ENCOUNTER — HOSPITAL ENCOUNTER (OUTPATIENT)
Dept: BONE DENSITY | Facility: HOSPITAL | Age: 71
Discharge: HOME | End: 2021-11-29
Payer: MEDICARE

## 2021-11-29 DIAGNOSIS — M85.88 OSTEOPENIA OF OTHER SITE: ICD-10-CM

## 2021-11-29 PROCEDURE — 77080 DXA BONE DENSITY AXIAL: CPT

## 2021-12-08 ENCOUNTER — IMMUNIZATIONS (OUTPATIENT)
Dept: FAMILY MEDICINE CLINIC | Facility: HOSPITAL | Age: 71
End: 2021-12-08

## 2021-12-08 DIAGNOSIS — Z23 ENCOUNTER FOR IMMUNIZATION: Primary | ICD-10-CM

## 2021-12-08 PROCEDURE — 91306 COVID-19 MODERNA VACC 0.25 ML BOOSTER: CPT

## 2021-12-08 PROCEDURE — 0064A COVID-19 MODERNA VACC 0.25 ML BOOSTER: CPT

## 2022-01-20 DIAGNOSIS — F51.01 PRIMARY INSOMNIA: ICD-10-CM

## 2022-01-20 RX ORDER — ZOLPIDEM TARTRATE 10 MG/1
TABLET ORAL
Qty: 90 TABLET | Refills: 0 | Status: SHIPPED | OUTPATIENT
Start: 2022-01-20 | End: 2022-07-15 | Stop reason: SDUPTHER

## 2022-01-23 DIAGNOSIS — I10 ESSENTIAL HYPERTENSION: ICD-10-CM

## 2022-01-23 DIAGNOSIS — E78.01 FAMILIAL HYPERCHOLESTEROLEMIA: ICD-10-CM

## 2022-01-24 RX ORDER — ROSUVASTATIN CALCIUM 10 MG/1
TABLET, COATED ORAL
Qty: 90 TABLET | Refills: 1 | Status: SHIPPED | OUTPATIENT
Start: 2022-01-24 | End: 2022-04-12 | Stop reason: SDUPTHER

## 2022-01-24 RX ORDER — AMLODIPINE BESYLATE AND BENAZEPRIL HYDROCHLORIDE 10; 40 MG/1; MG/1
1 CAPSULE ORAL DAILY
Qty: 90 CAPSULE | Refills: 1 | Status: SHIPPED | OUTPATIENT
Start: 2022-01-24 | End: 2022-01-25 | Stop reason: SDUPTHER

## 2022-01-25 RX ORDER — AMLODIPINE BESYLATE AND BENAZEPRIL HYDROCHLORIDE 10; 40 MG/1; MG/1
1 CAPSULE ORAL DAILY
Qty: 90 CAPSULE | Refills: 1 | Status: SHIPPED | OUTPATIENT
Start: 2022-01-25 | End: 2022-04-12 | Stop reason: SDUPTHER

## 2022-02-10 ENCOUNTER — APPOINTMENT (OUTPATIENT)
Dept: LAB | Facility: CLINIC | Age: 72
End: 2022-02-10
Payer: MEDICARE

## 2022-02-10 DIAGNOSIS — E03.8 SUBCLINICAL HYPOTHYROIDISM: ICD-10-CM

## 2022-02-10 DIAGNOSIS — E78.01 FAMILIAL HYPERCHOLESTEROLEMIA: ICD-10-CM

## 2022-02-10 LAB
CHOLEST SERPL-MCNC: 136 MG/DL
HDLC SERPL-MCNC: 66 MG/DL
LDLC SERPL CALC-MCNC: 56 MG/DL (ref 0–100)
T4 FREE SERPL-MCNC: 0.89 NG/DL (ref 0.76–1.46)
TRIGL SERPL-MCNC: 71 MG/DL
TSH SERPL DL<=0.05 MIU/L-ACNC: 3.85 UIU/ML (ref 0.36–3.74)

## 2022-02-10 PROCEDURE — 36415 COLL VENOUS BLD VENIPUNCTURE: CPT

## 2022-02-10 PROCEDURE — 80061 LIPID PANEL: CPT

## 2022-02-10 PROCEDURE — 84439 ASSAY OF FREE THYROXINE: CPT

## 2022-02-10 PROCEDURE — 84443 ASSAY THYROID STIM HORMONE: CPT

## 2022-02-16 ENCOUNTER — OFFICE VISIT (OUTPATIENT)
Dept: FAMILY MEDICINE CLINIC | Facility: CLINIC | Age: 72
End: 2022-02-16
Payer: MEDICARE

## 2022-02-16 VITALS
DIASTOLIC BLOOD PRESSURE: 84 MMHG | OXYGEN SATURATION: 99 % | WEIGHT: 168 LBS | SYSTOLIC BLOOD PRESSURE: 130 MMHG | HEIGHT: 65 IN | BODY MASS INDEX: 27.99 KG/M2 | TEMPERATURE: 97.5 F | HEART RATE: 82 BPM

## 2022-02-16 DIAGNOSIS — L98.9 SKIN LESION OF LEFT ARM: ICD-10-CM

## 2022-02-16 DIAGNOSIS — Z13.0 SCREENING FOR DEFICIENCY ANEMIA: ICD-10-CM

## 2022-02-16 DIAGNOSIS — Z00.00 ENCOUNTER FOR MEDICARE ANNUAL WELLNESS EXAM: Primary | ICD-10-CM

## 2022-02-16 DIAGNOSIS — L57.0 ACTINIC KERATOSES: ICD-10-CM

## 2022-02-16 DIAGNOSIS — Z13.31 DEPRESSION SCREENING NEGATIVE: ICD-10-CM

## 2022-02-16 DIAGNOSIS — J34.89 NASAL MUCOSA DRY: ICD-10-CM

## 2022-02-16 DIAGNOSIS — L98.9 SKIN LESIONS: ICD-10-CM

## 2022-02-16 DIAGNOSIS — R09.81 NASAL SINUS CONGESTION: ICD-10-CM

## 2022-02-16 DIAGNOSIS — Z13.1 SCREENING FOR DIABETES MELLITUS: ICD-10-CM

## 2022-02-16 DIAGNOSIS — I10 ESSENTIAL HYPERTENSION: ICD-10-CM

## 2022-02-16 DIAGNOSIS — E03.8 SUBCLINICAL HYPOTHYROIDISM: ICD-10-CM

## 2022-02-16 DIAGNOSIS — E78.01 FAMILIAL HYPERCHOLESTEROLEMIA: ICD-10-CM

## 2022-02-16 DIAGNOSIS — Z13.220 SCREENING FOR HYPERLIPIDEMIA: ICD-10-CM

## 2022-02-16 PROCEDURE — 99214 OFFICE O/P EST MOD 30 MIN: CPT | Performed by: NURSE PRACTITIONER

## 2022-02-16 PROCEDURE — G0438 PPPS, INITIAL VISIT: HCPCS | Performed by: NURSE PRACTITIONER

## 2022-02-16 NOTE — PROGRESS NOTES
Assessment and Plan:     Problem List Items Addressed This Visit     Familial hypercholesterolemia    Essential hypertension    Actinic keratoses    Relevant Orders    Ambulatory Referral to Dermatology    Subclinical hypothyroidism    Relevant Orders    TSH, 3rd generation    T4, free      Other Visit Diagnoses     Encounter for Medicare annual wellness exam    -  Primary    Skin lesions        Relevant Orders    Ambulatory Referral to Dermatology    Skin lesion of left arm        Relevant Orders    Ambulatory Referral to Dermatology    Depression screening negative        Screening for diabetes mellitus        Relevant Orders    Comprehensive metabolic panel    Screening for hyperlipidemia        Relevant Orders    Lipid Panel with Direct LDL reflex    Screening for deficiency anemia        Relevant Orders    CBC and differential    Nasal sinus congestion        Relevant Medications    Misc Natural Product Nasal (Nasal Cleanse Rinse Mix) PACK    Nasal mucosa dry            BMI Counseling: Body mass index is 27 96 kg/m²  The BMI is above normal  Nutrition recommendations include decreasing portion sizes, consuming healthier snacks, limiting drinks that contain sugar, moderation in carbohydrate intake and reducing intake of cholesterol  Exercise recommendations include exercising 3-5 times per week  No pharmacotherapy was ordered  Rationale for BMI follow-up plan is due to patient being overweight or obese  Depression Screening and Follow-up Plan: Patient was screened for depression during today's encounter  They screened negative with a PHQ-2 score of 0  Preventive health issues were discussed with patient, and age appropriate screening tests were ordered as noted in patient's After Visit Summary  Personalized health advice and appropriate referrals for health education or preventive services given if needed, as noted in patient's After Visit Summary       History of Present Illness:     Patient presents for Medicare Annual Wellness visit    Patient Care Team:  Matt Foster as PCP - General (Internal Medicine)  FORTUNATO Thompson MD     Problem List:     Patient Active Problem List   Diagnosis    Osteoarthrosis    Familial hypercholesterolemia    Foot pain, bilateral    Essential hypertension    Primary insomnia    Actinic keratoses    Allergic rhinitis    Awakens from sleep at night    Other dyschromia    Disease of sebaceous glands    Subclinical hypothyroidism    Trigger finger    Bunion of great toe of right foot    Right hip pain    Varicose veins of both legs with edema      Past Medical and Surgical History:     Past Medical History:   Diagnosis Date    Arthritis     Cataract     starting    Hallux valgus of right foot     Hyperlipidemia     Hypertension     RLL pneumonia     Last Assessed:11/4/14    Skin rash 11/30/2016    Wears glasses      Past Surgical History:   Procedure Laterality Date    BLADDER SUSPENSION      COLONOSCOPY      EYE SURGERY Right     Retinal Detachment complex    TX CORRJ HALLUX VALGUS W/SESMDC W/DIST METAR OSTEOT Right 9/29/2020    Procedure: Aranza Dowd;  Surgeon: Marielena Vazquez DPM;  [facility];  Service: Podiatry    WISDOM TOOTH EXTRACTION        Family History:     Family History   Problem Relation Age of Onset    Heart disease Mother         Coronary disease    Colon cancer Mother 67    Diabetes Mother     Heart disease Father         Coronary disease    Diabetes Father     Colon cancer Family     Coronary artery disease Family     Diabetes type II Family     No Known Problems Sister     No Known Problems Daughter     No Known Problems Maternal Grandmother     No Known Problems Maternal Grandfather     No Known Problems Paternal Grandmother     No Known Problems Paternal Grandfather     No Known Problems Son       Social History:     Social History     Socioeconomic History    Marital status: /Civil [employer]     Spouse name: None    Number of children: None    Years of education: None    Highest education level: None   Occupational History    Occupation: RETIRED   Tobacco Use    Smoking status: Never Smoker    Smokeless tobacco: Never Used   Vaping Use    Vaping Use: Never used   Substance and Sexual Activity    Alcohol use: Yes     Comment: beer    Drug use: No    Sexual activity: None   Other Topics Concern    None   Social History Narrative        Retired-Teacher     Social Determinants of Health     Financial Resource Strain: Not on file   Food Insecurity: Not on file   Transportation Needs: Not on file   Physical Activity: Not on file   Stress: Not on file   Social Connections: Not on file   Intimate Partner Violence: Not on file   Housing Stability: Not on file      Medications and Allergies:     Current Outpatient Medications   Medication Sig Dispense Refill    amLODIPine-benazepril (LOTREL) 10-40 MG per capsule Take 1 capsule by mouth daily 90 capsule 1    Calcium Carbonate-Vitamin D 600-200 MG-UNIT CAPS Take 1 capsule by mouth daily      cholecalciferol (VITAMIN D3) 1,000 units tablet Take 1,000 Units by mouth daily      cyanocobalamin (VITAMIN B-12) 500 mcg tablet Take 500 mcg by mouth daily      diclofenac sodium (VOLTAREN) 50 mg EC tablet Take 1 tablet (50 mg total) by mouth 2 (two) times a day Take with food- pt taking daily 180 tablet 1    rosuvastatin (CRESTOR) 10 MG tablet TAKE 1 TABLET BY MOUTH  DAILY 90 tablet 1    zolpidem (AMBIEN) 10 mg tablet TAKE 1 TABLET BY MOUTH  EVERY NIGHT AT BEDTIME AS  NEEDED FOR INSOMNIA 90 tablet 0    diclofenac sodium (VOLTAREN) 1 % APPLY 2 GRAMS TOPICALLY 4  (FOUR) TIMES A DAY (Patient not taking: Reported on 8/16/2021) 600 g 2    Misc Natural Product Nasal (Nasal Cleanse Rinse Mix) PACK 1 Container into each nostril 3 (three) times a day as needed (nasal congestion) 1 each 0     No current facility-administered medications for this visit  No Known Allergies   Immunizations:     Immunization History   Administered Date(s) Administered    COVID-19 MODERNA VACC 0 25 ML IM BOOSTER 12/08/2021    COVID-19 MODERNA VACC 0 5 ML IM 04/01/2021, 04/29/2021    INFLUENZA 11/09/2015, 11/16/2016, 10/10/2017    Influenza Split High Dose Preservative Free IM 10/21/2013, 01/08/2015, 11/09/2015, 11/16/2016, 10/10/2017    Influenza, high dose seasonal 0 7 mL 09/25/2018, 10/24/2019, 09/21/2020, 10/13/2021    Influenza, seasonal, injectable 09/26/2012    Pneumococcal Conjugate 13-Valent 01/08/2015    Pneumococcal Polysaccharide PPV23 11/14/2013    Tdap 11/09/2015      Health Maintenance:         Topic Date Due    Breast Cancer Screening: Mammogram  06/29/2023    DXA SCAN  11/29/2023    Colorectal Cancer Screening  03/11/2029    Hepatitis C Screening  Completed     There are no preventive care reminders to display for this patient  Medicare Health Risk Assessment:     /84 (BP Location: Left arm, Patient Position: Sitting, Cuff Size: Standard)   Pulse 82   Temp 97 5 °F (36 4 °C) (Tympanic)   Ht 5' 5" (1 651 m)   Wt 76 2 kg (168 lb)   SpO2 99%   BMI 27 96 kg/m²      Beny Solid is here for her Subsequent Wellness visit  Last Medicare Wellness visit information reviewed, patient interviewed and updates made to the record today  Health Risk Assessment:   Patient rates overall health as very good  Patient feels that their physical health rating is same  Patient is satisfied with their life  Eyesight was rated as same  Hearing was rated as same  Patient feels that their emotional and mental health rating is same  Patients states they are never, rarely angry  Patient states they are never, rarely unusually tired/fatigued  Pain experienced in the last 7 days has been some  Patient's pain rating has been 5/10  Patient states that she has experienced no weight loss or gain in last 6 months       Depression Screening:   PHQ-2 Score: 0      Fall Risk Screening: In the past year, patient has experienced: no history of falling in past year      Urinary Incontinence Screening:   Patient has not leaked urine accidently in the last six months  Home Safety:  Patient does not have trouble with stairs inside or outside of their home  Patient has working smoke alarms and has working carbon monoxide detector  Home safety hazards include: none  Nutrition:   Current diet is Regular  Medications:   Patient is currently taking over-the-counter supplements  OTC medications include: see medication list  Patient is able to manage medications  Activities of Daily Living (ADLs)/Instrumental Activities of Daily Living (IADLs):   Walk and transfer into and out of bed and chair?: Yes  Dress and groom yourself?: Yes    Bathe or shower yourself?: Yes    Feed yourself? Yes  Do your laundry/housekeeping?: Yes  Manage your money, pay your bills and track your expenses?: Yes  Make your own meals?: Yes    Do your own shopping?: Yes    Previous Hospitalizations:   Any hospitalizations or ED visits within the last 12 months?: No      Advance Care Planning:   Living will: Yes    Durable POA for healthcare:  Yes    Advanced directive: Yes    Advanced directive counseling given: Yes    Five wishes given: Yes    Patient declined ACP directive: No    End of Life Decisions reviewed with patient: Yes    Provider agrees with end of life decisions: Yes      Cognitive Screening:   Provider or family/friend/caregiver concerned regarding cognition?: No    PREVENTIVE SCREENINGS      Cardiovascular Screening:    General: Screening Not Indicated and History Lipid Disorder      Diabetes Screening:     General: Screening Current      Colorectal Cancer Screening:     General: Screening Current      Breast Cancer Screening:     General: Screening Current      Cervical Cancer Screening:    General: Screening Not Indicated      Osteoporosis Screening:    General: Screening Current      Abdominal Aortic Aneurysm (AAA) Screening:        General: Screening Not Indicated      Lung Cancer Screening:     General: Screening Not Indicated      Hepatitis C Screening:    General: Screening Current    Screening, Brief Intervention, and Referral to Treatment (SBIRT)    Screening      Single Item Drug Screening:  How often have you used an illegal drug (including marijuana) or a prescription medication for non-medical reasons in the past year? never    Single Item Drug Screen Score: 0  Interpretation: Negative screen for possible drug use disorder    Other Counseling Topics:   Car/seat belt/driving safety, skin self-exam, sunscreen and calcium and vitamin D intake and regular weightbearing exercise         Chema Contreras

## 2022-02-16 NOTE — PATIENT INSTRUCTIONS
Medicare Preventive Visit Patient Instructions  Thank you for completing your Welcome to Medicare Visit or Medicare Annual Wellness Visit today  Your next wellness visit will be due in one year (2/17/2023)  The screening/preventive services that you may require over the next 5-10 years are detailed below  Some tests may not apply to you based off risk factors and/or age  Screening tests ordered at today's visit but not completed yet may show as past due  Also, please note that scanned in results may not display below  Preventive Screenings:  Service Recommendations Previous Testing/Comments   Colorectal Cancer Screening  * Colonoscopy    * Fecal Occult Blood Test (FOBT)/Fecal Immunochemical Test (FIT)  * Fecal DNA/Cologuard Test  * Flexible Sigmoidoscopy Age: 54-65 years old   Colonoscopy: every 10 years (may be performed more frequently if at higher risk)  OR  FOBT/FIT: every 1 year  OR  Cologuard: every 3 years  OR  Sigmoidoscopy: every 5 years  Screening may be recommended earlier than age 48 if at higher risk for colorectal cancer  Also, an individualized decision between you and your healthcare provider will decide whether screening between the ages of 74-80 would be appropriate  Colonoscopy: 03/11/2019  FOBT/FIT: Not on file  Cologuard: Not on file  Sigmoidoscopy: Not on file    Screening Current     Breast Cancer Screening Age: 36 years old  Frequency: every 1-2 years  Not required if history of left and right mastectomy Mammogram: 06/29/2021    Screening Current   Cervical Cancer Screening Between the ages of 21-29, pap smear recommended once every 3 years  Between the ages of 33-67, can perform pap smear with HPV co-testing every 5 years     Recommendations may differ for women with a history of total hysterectomy, cervical cancer, or abnormal pap smears in past  Pap Smear: 04/17/2019    Screening Not Indicated   Hepatitis C Screening Once for adults born between 1945 and 1965  More frequently in patients at high risk for Hepatitis C Hep C Antibody: 01/03/2017    Screening Current   Diabetes Screening 1-2 times per year if you're at risk for diabetes or have pre-diabetes Fasting glucose: 84 mg/dL   A1C: No results in last 5 years    Screening Current   Cholesterol Screening Once every 5 years if you don't have a lipid disorder  May order more often based on risk factors  Lipid panel: 02/10/2022    Screening Not Indicated  History Lipid Disorder     Other Preventive Screenings Covered by Medicare:  1  Abdominal Aortic Aneurysm (AAA) Screening: covered once if your at risk  You're considered to be at risk if you have a family history of AAA  2  Lung Cancer Screening: covers low dose CT scan once per year if you meet all of the following conditions: (1) Age 50-69; (2) No signs or symptoms of lung cancer; (3) Current smoker or have quit smoking within the last 15 years; (4) You have a tobacco smoking history of at least 30 pack years (packs per day multiplied by number of years you smoked); (5) You get a written order from a healthcare provider  3  Glaucoma Screening: covered annually if you're considered high risk: (1) You have diabetes OR (2) Family history of glaucoma OR (3)  aged 48 and older OR (3)  American aged 72 and older  3  Osteoporosis Screening: covered every 2 years if you meet one of the following conditions: (1) You're estrogen deficient and at risk for osteoporosis based off medical history and other findings; (2) Have a vertebral abnormality; (3) On glucocorticoid therapy for more than 3 months; (4) Have primary hyperparathyroidism; (5) On osteoporosis medications and need to assess response to drug therapy  · Last bone density test (DXA Scan): 11/29/2021   5  HIV Screening: covered annually if you're between the age of 15-65  Also covered annually if you are younger than 13 and older than 72 with risk factors for HIV infection   For pregnant patients, it is covered up to 3 times per pregnancy  Immunizations:  Immunization Recommendations   Influenza Vaccine Annual influenza vaccination during flu season is recommended for all persons aged >= 6 months who do not have contraindications   Pneumococcal Vaccine (Prevnar and Pneumovax)  * Prevnar = PCV13  * Pneumovax = PPSV23   Adults 25-60 years old: 1-3 doses may be recommended based on certain risk factors  Adults 72 years old: Prevnar (PCV13) vaccine recommended followed by Pneumovax (PPSV23) vaccine  If already received PPSV23 since turning 65, then PCV13 recommended at least one year after PPSV23 dose  Hepatitis B Vaccine 3 dose series if at intermediate or high risk (ex: diabetes, end stage renal disease, liver disease)   Tetanus (Td) Vaccine - COST NOT COVERED BY MEDICARE PART B Following completion of primary series, a booster dose should be given every 10 years to maintain immunity against tetanus  Td may also be given as tetanus wound prophylaxis  Tdap Vaccine - COST NOT COVERED BY MEDICARE PART B Recommended at least once for all adults  For pregnant patients, recommended with each pregnancy  Shingles Vaccine (Shingrix) - COST NOT COVERED BY MEDICARE PART B  2 shot series recommended in those aged 48 and above     Health Maintenance Due:      Topic Date Due    Breast Cancer Screening: Mammogram  06/29/2023    DXA SCAN  11/29/2023    Colorectal Cancer Screening  03/11/2029    Hepatitis C Screening  Completed     Immunizations Due:  There are no preventive care reminders to display for this patient  Advance Directives   What are advance directives? Advance directives are legal documents that state your wishes and plans for medical care  These plans are made ahead of time in case you lose your ability to make decisions for yourself  Advance directives can apply to any medical decision, such as the treatments you want, and if you want to donate organs  What are the types of advance directives?   There are many types of advance directives, and each state has rules about how to use them  You may choose a combination of any of the following:  · Living will: This is a written record of the treatment you want  You can also choose which treatments you do not want, which to limit, and which to stop at a certain time  This includes surgery, medicine, IV fluid, and tube feedings  · Durable power of  for healthcare [facility]): This is a written record that states who you want to make healthcare choices for you when you are unable to make them for yourself  This person, called a proxy, is usually a family member or a friend  You may choose more than 1 proxy  · Do not resuscitate (DNR) order:  A DNR order is used in case your heart stops beating or you stop breathing  It is a request not to have certain forms of treatment, such as CPR  A DNR order may be included in other types of advance directives  · Medical directive: This covers the care that you want if you are in a coma, near death, or unable to make decisions for yourself  You can list the treatments you want for each condition  Treatment may include pain medicine, surgery, blood transfusions, dialysis, IV or tube feedings, and a ventilator (breathing machine)  · Values history: This document has questions about your views, beliefs, and how you feel and think about life  This information can help others choose the care that you would choose  Why are advance directives important? An advance directive helps you control your care  Although spoken wishes may be used, it is better to have your wishes written down  Spoken wishes can be misunderstood, or not followed  Treatments may be given even if you do not want them  An advance directive may make it easier for your family to make difficult choices about your care     Weight Management   Why it is important to manage your weight:  Being overweight increases your risk of health conditions such as heart disease, high blood pressure, type 2 diabetes, and certain types of cancer  It can also increase your risk for osteoarthritis, sleep apnea, and other respiratory problems  Aim for a slow, steady weight loss  Even a small amount of weight loss can lower your risk of health problems  How to lose weight safely:  A safe and healthy way to lose weight is to eat fewer calories and get regular exercise  You can lose up about 1 pound a week by decreasing the number of calories you eat by 500 calories each day  Healthy meal plan for weight management:  A healthy meal plan includes a variety of foods, contains fewer calories, and helps you stay healthy  A healthy meal plan includes the following:  · Eat whole-grain foods more often  A healthy meal plan should contain fiber  Fiber is the part of grains, fruits, and vegetables that is not broken down by your body  Whole-grain foods are healthy and provide extra fiber in your diet  Some examples of whole-grain foods are whole-wheat breads and pastas, oatmeal, brown rice, and bulgur  · Eat a variety of vegetables every day  Include dark, leafy greens such as spinach, kale, mundo greens, and mustard greens  Eat yellow and orange vegetables such as carrots, sweet potatoes, and winter squash  · Eat a variety of fruits every day  Choose fresh or canned fruit (canned in its own juice or light syrup) instead of juice  Fruit juice has very little or no fiber  · Eat low-fat dairy foods  Drink fat-free (skim) milk or 1% milk  Eat fat-free yogurt and low-fat cottage cheese  Try low-fat cheeses such as mozzarella and other reduced-fat cheeses  · Choose meat and other protein foods that are low in fat  Choose beans or other legumes such as split peas or lentils  Choose fish, skinless poultry (chicken or turkey), or lean cuts of red meat (beef or pork)  Before you cook meat or poultry, cut off any visible fat  · Use less fat and oil  Try baking foods instead of frying them   Add less fat, such as margarine, sour cream, regular salad dressing and mayonnaise to foods  Eat fewer high-fat foods  Some examples of high-fat foods include french fries, doughnuts, ice cream, and cakes  · Eat fewer sweets  Limit foods and drinks that are high in sugar  This includes candy, cookies, regular soda, and sweetened drinks  Exercise:  Exercise at least 30 minutes per day on most days of the week  Some examples of exercise include walking, biking, dancing, and swimming  You can also fit in more physical activity by taking the stairs instead of the elevator or parking farther away from stores  Ask your healthcare provider about the best exercise plan for you  © Copyright [employer] 2018 Information is for End User's use only and may not be sold, redistributed or otherwise used for commercial purposes   All illustrations and images included in CareNotes® are the copyrighted property of [employer]  or [address] [place] [address]

## 2022-02-16 NOTE — PROGRESS NOTES
Assessment/Plan:    Problem List Items Addressed This Visit     Familial hypercholesterolemia    Essential hypertension    Actinic keratoses    Relevant Orders    Ambulatory Referral to Dermatology    Subclinical hypothyroidism    Relevant Orders    TSH, 3rd generation    T4, free      Other Visit Diagnoses     Encounter for Medicare annual wellness exam    -  Primary    Skin lesions        Relevant Orders    Ambulatory Referral to Dermatology    Skin lesion of left arm        Relevant Orders    Ambulatory Referral to Dermatology    Depression screening negative        Screening for diabetes mellitus        Relevant Orders    Comprehensive metabolic panel    Screening for hyperlipidemia        Relevant Orders    Lipid Panel with Direct LDL reflex    Screening for deficiency anemia        Relevant Orders    CBC and differential    Nasal sinus congestion        Relevant Medications    Misc Natural Product Nasal (Nasal Cleanse Rinse Mix) PACK    Nasal mucosa dry               Diagnoses and all orders for this visit:    Encounter for Medicare annual wellness exam    Skin lesions  -     Ambulatory Referral to Dermatology; Future    Skin lesion of left arm  -     Ambulatory Referral to Dermatology; Future    Actinic keratoses  -     Ambulatory Referral to Dermatology; Future    Depression screening negative    Subclinical hypothyroidism  -     TSH, 3rd generation; Future  -     T4, free; Future    Essential hypertension    Familial hypercholesterolemia    Screening for diabetes mellitus  -     Comprehensive metabolic panel; Future    Screening for hyperlipidemia  -     Lipid Panel with Direct LDL reflex;  Future    Screening for deficiency anemia  -     CBC and differential; Future    Nasal sinus congestion  -     Misc Natural Product Nasal (Nasal Cleanse Rinse Mix) PACK; 1 Container into each nostril 3 (three) times a day as needed (nasal congestion)    Nasal mucosa dry        No problem-specific Assessment & Plan notes found for this encounter  Subjective:      Patient ID: Alda Bryant is a 66 y o  female  Patient has known history of hypertension, hyperlipidemia, subclinical hypothyroidism, osteoarthrosis, and multiple skin lesions presents for annual medical wellness physical and has complained of sinus tenderness  Patient was taking Claritin for many years but has become ineffective  Patient states that her facial sinuses predominantly in the morning with upon arising from bed  This does resolve after showering  Patient has used will nasal spray in the past which caused acute epistaxis  Patient does report a postnasal drip is clear  Denies any fever, chills, rigors, or rhinorrhea  Recommend nasal rinse kit and room humidification QHS  Hyperlipidemia  This is a chronic problem  The problem is controlled  There are no known factors aggravating her hyperlipidemia  Current antihyperlipidemic treatment includes statins and diet change  The current treatment provides significant improvement of lipids  Hypertension  This is a chronic problem  The problem is controlled  There are no associated agents to hypertension  Past treatments include calcium channel blockers and ACE inhibitors  The current treatment provides moderate improvement  There are no compliance problems  Identifiable causes of hypertension include a thyroid problem  Thyroid Problem  Presents for follow-up visit  The symptoms have been stable  Her past medical history is significant for hyperlipidemia  The following portions of the patient's history were reviewed and updated as appropriate:   She has a past medical history of Arthritis, Cataract, Hallux valgus of right foot, Hyperlipidemia, Hypertension, RLL pneumonia, Skin rash (11/30/2016), and Wears glasses  ,  does not have any pertinent problems on file  ,   has a past surgical history that includes Bladder suspension; Eye surgery (Right); Colonoscopy;  [place] tooth extraction; and pr bala francoisnuha valdez w/sesmdc w/dist jacqui paz (Right, 9/29/2020)  ,  family history includes Colon cancer in her family; Colon cancer (age of onset: 67) in her mother; Coronary artery disease in her family; Diabetes in her father and mother; Diabetes type II in her family; Heart disease in her father and mother; No Known Problems in her daughter, maternal grandfather, maternal grandmother, paternal grandfather, paternal grandmother, sister, and son ,   reports that she has never smoked  She has never used smokeless tobacco  She reports current alcohol use  She reports that she does not use drugs  ,  has No Known Allergies     Current Outpatient Medications   Medication Sig Dispense Refill    amLODIPine-benazepril (LOTREL) 10-40 MG per capsule Take 1 capsule by mouth daily 90 capsule 1    Calcium Carbonate-Vitamin D 600-200 MG-UNIT CAPS Take 1 capsule by mouth daily      cholecalciferol (VITAMIN D3) 1,000 units tablet Take 1,000 Units by mouth daily      cyanocobalamin (VITAMIN B-12) 500 mcg tablet Take 500 mcg by mouth daily      diclofenac sodium (VOLTAREN) 50 mg EC tablet Take 1 tablet (50 mg total) by mouth 2 (two) times a day Take with food- pt taking daily 180 tablet 1    rosuvastatin (CRESTOR) 10 MG tablet TAKE 1 TABLET BY MOUTH  DAILY 90 tablet 1    zolpidem (AMBIEN) 10 mg tablet TAKE 1 TABLET BY MOUTH  EVERY NIGHT AT BEDTIME AS  NEEDED FOR INSOMNIA 90 tablet 0    diclofenac sodium (VOLTAREN) 1 % APPLY 2 GRAMS TOPICALLY 4  (FOUR) TIMES A DAY (Patient not taking: Reported on 8/16/2021) 600 g 2    Misc Natural Product Nasal (Nasal Cleanse Rinse Mix) PACK 1 Container into each nostril 3 (three) times a day as needed (nasal congestion) 1 each 0     No current facility-administered medications for this visit  Depression Screening and Follow-up Plan: Patient was screened for depression during today's encounter  They screened negative with a PHQ-2 score of 0            Review of Systems Constitutional: Negative  HENT: Positive for postnasal drip and sinus pressure  Eyes: Negative  Respiratory: Negative  Cardiovascular: Negative  Gastrointestinal: Negative  Endocrine: Negative  Genitourinary: Negative  Musculoskeletal: Negative  Skin: Negative  Allergic/Immunologic: Negative  Neurological: Negative  Hematological: Negative  Psychiatric/Behavioral: Negative  Objective:  Vitals:    02/16/22 0931   BP: 130/84   BP Location: Left arm   Patient Position: Sitting   Cuff Size: Standard   Pulse: 82   Temp: 97 5 °F (36 4 °C)   TempSrc: Tympanic   SpO2: 99%   Weight: 76 2 kg (168 lb)   Height: 5' 5" (1 651 m)     Body mass index is 27 96 kg/m²  Physical Exam  Vitals and nursing note reviewed  Constitutional:       Appearance: Normal appearance  She is well-developed  Interventions: Face mask in place  HENT:      Head: Normocephalic and atraumatic  Right Ear: Tympanic membrane, ear canal and external ear normal       Left Ear: Tympanic membrane, ear canal and external ear normal       Nose: Nose normal       Mouth/Throat:      Mouth: Mucous membranes are moist       Pharynx: Uvula midline  Eyes:      General: Lids are normal       Conjunctiva/sclera: Conjunctivae normal       Pupils: Pupils are equal, round, and reactive to light  Neck:      Thyroid: No thyroid mass or thyromegaly  Vascular: No JVD  Trachea: Trachea and phonation normal    Cardiovascular:      Rate and Rhythm: Normal rate and regular rhythm  Pulses: Normal pulses  Heart sounds: Normal heart sounds, S1 normal and S2 normal  No murmur heard  No friction rub  No gallop  Pulmonary:      Effort: Pulmonary effort is normal       Breath sounds: Normal breath sounds  Abdominal:      General: Bowel sounds are normal       Palpations: Abdomen is soft  Tenderness: There is no abdominal tenderness     Genitourinary:     Comments: Deferred Musculoskeletal:         General: Normal range of motion  Cervical back: Full passive range of motion without pain, normal range of motion and neck supple  Right lower leg: No edema  Left lower leg: No edema  Lymphadenopathy:      Head:      Right side of head: No submental, submandibular, tonsillar, preauricular, posterior auricular or occipital adenopathy  Left side of head: No submental, submandibular, tonsillar, preauricular, posterior auricular or occipital adenopathy  Cervical: No cervical adenopathy  Skin:     General: Skin is warm and dry  Capillary Refill: Capillary refill takes less than 2 seconds  Neurological:      General: No focal deficit present  Mental Status: She is alert and oriented to person, place, and time  Cranial Nerves: Cranial nerves are intact  No cranial nerve deficit  Sensory: Sensation is intact  Motor: Motor function is intact  Coordination: Coordination is intact  Gait: Gait is intact  Deep Tendon Reflexes: Reflexes are normal and symmetric  Psychiatric:         Attention and Perception: Attention and perception normal          Mood and Affect: Mood and affect normal          Speech: Speech normal          Behavior: Behavior normal  Behavior is cooperative  Thought Content: Thought content normal          Cognition and Memory: Cognition normal          Judgment: Judgment normal          Appointment on 02/10/2022   Component Date Value Ref Range Status    TSH [address] [place] [address] 02/10/2022 3 850* 0 358 - 3 740 uIU/mL Final    The recommended reference ranges for TSH during pregnancy are as follows:   First trimester 0 1 to 2 5 uIU/mL   Second trimester  0 2 to 3 0 uIU/mL   Third trimester 0 3 to 3 0 uIU/m    Note: Normal ranges may not apply to patients who are transgender, non-binary, or whose legal sex, sex at birth, and gender identity differ      Free T4 02/10/2022 0 89  0 76 - 1 46 ng/dL Final Specimen collection should occur prior to Sulfasalazine administration due to the potential for falsely elevated results   Cholesterol 02/10/2022 136  See Comment mg/dL Final    Cholesterol:         Pediatric <18 Years        Desirable          <170 mg/dL      Borderline High    170-199 mg/dL      High               >=200 mg/dL        Adult >=18 Years            Desirable         <200 mg/dL      Borderline High   200-239 mg/dL      High              >239 mg/dL      Triglycerides 02/10/2022 71  See Comment mg/dL Final    Triglyceride:     0-9Y            <75mg/dL     10Y-17Y         <90 mg/dL       >=18Y     Normal          <150 mg/dL     Borderline High 150-199 mg/dL     High            200-499 mg/dL        Very High       >499 mg/dL    Specimen collection should occur prior to N-Acetylcysteine or Metamizole administration due to the potential for falsely depressed results   HDL, Direct 02/10/2022 66  >=50 mg/dL Final    Specimen collection should occur prior to Metamizole administration due to the potential for falsley depressed results   LDL Calculated 02/10/2022 56  0 - 100 mg/dL Final    LDL Cholesterol:     Optimal           <100 mg/dl     Near Optimal      100-129 mg/dl     Above Optimal       Borderline High 130-159 mg/dl       High            160-189 mg/dl       Very High       >189 mg/dl         This screening LDL is a calculated result  It does not have the accuracy of the Direct Measured LDL in the monitoring of patients with hyperlipidemia and/or statin therapy  Direct Measure LDL (AFP329) must be ordered separately in these patients

## 2022-04-07 ENCOUNTER — APPOINTMENT (OUTPATIENT)
Dept: PHYSICAL THERAPY | Facility: CLINIC | Age: 72
End: 2022-04-07
Payer: MEDICARE

## 2022-04-07 NOTE — PROGRESS NOTES
PT Evaluation     Today's date: 2022  Patient name: Jorge Isabel  :   MRN: [id number]  Referring provider: Gray Isidro*  Dx:   Encounter Diagnosis     ICD-10-CM    1  Pain in right hip  M25 551                   Assessment  Assessment details: Jorge Isabel is a pleasant 66 y o  presenting to physical therapy with MD referral for Pain in right hip  (primary encounter diagnosis)  Problem list:  Limited hip AROM, decreased hip/core strength, limited lower extremity flexibility, poor balance, and increased muscle tension    Treatment to include: Manual therapy techniques, lower extremity/core strengthening, neuromuscular control exercises, balance/proprioception training, and instruction in a comprehensive HEP, and modalities as needed  This pt would benefit from skilled PT services to address their impairments and functional limitations to maximize functional outcome  Symptom irritability: moderateBarriers to therapy: HTN,   Understanding of Dx/Px/POC: good   Prognosis: good    Goals  ST  Pt will improve hip IR to at least 45 degrees in 3 weeks  2  Pt will improve hip ER strength to at least 4+/5 in 3 weeks  LT  Pt will be able to stand > 30 minutes with minimal to no pain in 6 weeks  2  Pt will be independent in a comprehensive HEP in 6 weeks  Plan  Patient would benefit from: skilled physical therapy  Frequency: 2x week  Duration in weeks: 6  Treatment plan discussed with: patient        Subjective Evaluation    History of Present Illness  Mechanism of injury: Pt reports she has been getting injections in her right hip for many years off and on as needed  Pt states she experiences pain in right SIJ as well as across right gluteal region into lateral hip  Pt reports in 2020, she had a bunionectomy and needed to use a scooter  Pt states since that time the pain has increased   Pt saw benny who provided pt with lidocaine and kenalog injection in greater trochanter on 22 with good relief of greater trochanter pain  Pt states she feels an almost weakness in right SIJ when standing single leg on right  Premorbid status:  - ADLs: Independent with no difficulty  - Work: Not a working individual  - Recreation: silver sneakers    Current status:  - ADLs/Functional activities:   - Stairs Reciprocal pattern with Pain Levels: mild pain   - Sit to stand with mild pain   - Walking >30 minutes without increase in pain   - Standing 15-20 minutes prior to increase in pain   - Sitting 1 hour prior to increase in pain   - Sleeping with 0 nightly sleep disturbances due to pain   - making bed (twisting and pulling) with moderate pain    - Donning socks and shoes with leg crossed on opposite ankle with moderate pain  - Work: Not a working individual  - Recreation: occasionally doing [employer]  Pain  Current pain ratin  At best pain ratin  At worst pain ratin  Location: right gluteal region into lateral hip  Quality: dull ache  Progression: improved      Diagnostic Tests  X-ray: normal  Treatments  Previous treatment: injection treatment  Current treatment: injection treatment and physical therapy  Patient Goals  Patient goals for therapy: decreased pain, increased strength and improved balance          Objective     Palpation     Additional Palpation Details  TTP over right piriformis with increased muscle tension as compared to left  TTP over greater trochanter on R      Active Range of Motion   Left Hip   Flexion: 120 degrees   Extension: 18 degrees   Abduction: 40 degrees   Adduction: 30 degrees   External rotation (90/90): 32 degrees   Internal rotation (90/90): 35 degrees     Right Hip   Flexion: 120 degrees   Extension: 18 degrees   Abduction: 40 degrees   Adduction: 26 degrees   External rotation (90/90): 35 degrees with pain  Internal rotation (90/90): 32 degrees with pain    Passive Range of Motion   Left Hip   Flexion: 130 degrees     Right Hip   External rotation (90/90): 55 degrees   Internal rotation (90/90): 40 degrees     Strength/Myotome Testing     Left Hip   Planes of Motion   Flexion: 4  External rotation: 4  Internal rotation: 4    Right Hip   Planes of Motion   Flexion: 4  External rotation: 4  Internal rotation: 4-    Left Knee   Flexion: 5  Extension: 5    Right Knee   Flexion: 5  Extension: 5    Left Ankle/Foot   Dorsiflexion: 5  Plantar flexion: 5    Right Ankle/Foot   Dorsiflexion: 5  Plantar flexion: 5    Additional Strength Details  SLS L: 3 seconds  SLS R: 3 seconds    Flexibility:  - HS: mild restriction on R, minimal on L    Double leg Squat: to 70 deg knee flexion with mild lateral shift to the right with narrow base of support      Tests     Right Hip   Negative SI compression  Adalberto: Negative  Additional Tests Details  Lumbar ROM WFL with no pain reported               Precautions: HTN      Manuals 4-8 (IE)            Pin and stretch to right piriformis muscle NV            STM to right piriformis muscle with roller NV                                      Neuro Re-Ed             SLS  15" x 3 NV            BIODEX squats             Lateral cone walks                                                                 Ther Ex             NuStep 8 mins, L4 NV                         Seated:             - hamstring stretch 4 x 30" NV                                      Table:             - figure 4 piriformis stretch 4 x 30" NV            - ITB stretch with strap 4 x 30" NV            - bridges with TB around knees 20 x 5" RTB NV                                      Standing:             - hip abd 2 x 10 ea NV            - hip ext 2 x 10 ea NV                         Ther Activity             Front step ups             Lateral step ups             Squats                                       Gait Training                                       Modalities             Cryo/MH as needed                            * On initial evaluation, educated pt on anatomy, pathology, and exercise rationale  Provided pt with basic HEP and ensured proper exercise performance  Educated pt to call with any questions or concerns  Access Code: [email or web address]  URL: [email or web address]  Date: 04/08/2022  Prepared by: Amaury Baires    Exercises  · Supine Lower Trunk Rotation - 3 x daily - 10 reps - 2 seconds hold  · Supine Posterior Pelvic Tilt - 3 x daily - 10 reps - 10 seconds hold  · Supine Piriformis Stretch with Foot on Ground - 3 x daily - 4 reps - 30 seconds hold  · Seated Piriformis Stretch with Trunk Bend - 3 x daily - 4 reps - 30 seconds hold

## 2022-04-08 ENCOUNTER — EVALUATION (OUTPATIENT)
Dept: PHYSICAL THERAPY | Facility: CLINIC | Age: 72
End: 2022-04-08
Payer: MEDICARE

## 2022-04-08 DIAGNOSIS — M25.551 PAIN IN RIGHT HIP: Primary | ICD-10-CM

## 2022-04-08 PROCEDURE — 97110 THERAPEUTIC EXERCISES: CPT | Performed by: PHYSICAL THERAPIST

## 2022-04-08 PROCEDURE — 97162 PT EVAL MOD COMPLEX 30 MIN: CPT | Performed by: PHYSICAL THERAPIST

## 2022-04-08 NOTE — LETTER
2022    MD Tavares Cali    Patient: Gavin Ornelas   YOB: 1950   Date of Visit: 2022     Encounter Diagnosis     ICD-10-CM    1  Pain in right hip  M25 551        Dear Dr Gina Arce: Thank you for your recent referral of Gavin Ornelas  Please review the attached evaluation summary from Talita's recent visit  Please verify that you agree with the plan of care by signing the attached order  If you have any questions or concerns, please do not hesitate to call  I sincerely appreciate the opportunity to share in the care of one of your patients and hope to have another opportunity to work with you in the near future  Sincerely,    Janis Collins, PT      Referring Provider:      I certify that I have read the below Plan of Care and certify the need for these services furnished under this plan of treatment while under my care  MD Tavares Cali  Via Fax: [phone]          PT Evaluation     Today's date: 2022  Patient name: Gavin Ornelas  :   MRN: [id number]  Referring provider: Barbara Whyte*  Dx:   Encounter Diagnosis     ICD-10-CM    1  Pain in right hip  M25 551                   Assessment  Assessment details: Gavin Ornelas is a pleasant 66 y o  presenting to physical therapy with MD referral for Pain in right hip  (primary encounter diagnosis)  Problem list:  Limited hip AROM, decreased hip/core strength, limited lower extremity flexibility, poor balance, and increased muscle tension    Treatment to include: Manual therapy techniques, lower extremity/core strengthening, neuromuscular control exercises, balance/proprioception training, and instruction in a comprehensive HEP, and modalities as needed       This pt would benefit from skilled PT services to address their impairments and functional limitations to maximize functional outcome  Symptom irritability: moderateBarriers to therapy: HTN,   Understanding of Dx/Px/POC: good   Prognosis: good    Goals  ST  Pt will improve hip IR to at least 45 degrees in 3 weeks  2  Pt will improve hip ER strength to at least 4+/5 in 3 weeks  LT  Pt will be able to stand > 30 minutes with minimal to no pain in 6 weeks  2  Pt will be independent in a comprehensive HEP in 6 weeks  Plan  Patient would benefit from: skilled physical therapy  Frequency: 2x week  Duration in weeks: 6  Treatment plan discussed with: patient        Subjective Evaluation    History of Present Illness  Mechanism of injury: Pt reports she has been getting injections in her right hip for many years off and on as needed  Pt states she experiences pain in right SIJ as well as across right gluteal region into lateral hip  Pt reports in 2020, she had a bunionectomy and needed to use a scooter  Pt states since that time the pain has increased  Pt saw benny who provided pt with lidocaine and kenalog injection in greater trochanter on 22 with good relief of greater trochanter pain  Pt states she feels an almost weakness in right SIJ when standing single leg on right       Premorbid status:  - ADLs: Independent with no difficulty  - Work: Not a working individual  - Recreation: [employer]    Current status:  - ADLs/Functional activities:   - Stairs Reciprocal pattern with Pain Levels: mild pain   - Sit to stand with mild pain   - Walking >30 minutes without increase in pain   - Standing 15-20 minutes prior to increase in pain   - Sitting 1 hour prior to increase in pain   - Sleeping with 0 nightly sleep disturbances due to pain   - making bed (twisting and pulling) with moderate pain    - Donning socks and shoes with leg crossed on opposite ankle with moderate pain  - Work: Not a working individual  - Recreation: occasionally doing silver sneakers  Pain  Current pain ratin  At best pain ratin  At worst pain ratin  Location: right gluteal region into lateral hip  Quality: dull ache  Progression: improved      Diagnostic Tests  X-ray: normal  Treatments  Previous treatment: injection treatment  Current treatment: injection treatment and physical therapy  Patient Goals  Patient goals for therapy: decreased pain, increased strength and improved balance          Objective     Palpation     Additional Palpation Details  TTP over right piriformis with increased muscle tension as compared to left  TTP over greater trochanter on R  Active Range of Motion   Left Hip   Flexion: 120 degrees   Extension: 18 degrees   Abduction: 40 degrees   Adduction: 30 degrees   External rotation (90/90): 32 degrees   Internal rotation (90/90): 35 degrees     Right Hip   Flexion: 120 degrees   Extension: 18 degrees   Abduction: 40 degrees   Adduction: 26 degrees   External rotation (90/90): 35 degrees with pain  Internal rotation (90/90): 32 degrees with pain    Passive Range of Motion   Left Hip   Flexion: 130 degrees     Right Hip   External rotation (90/90): 55 degrees   Internal rotation (90/90): 40 degrees     Strength/Myotome Testing     Left Hip   Planes of Motion   Flexion: 4  External rotation: 4  Internal rotation: 4    Right Hip   Planes of Motion   Flexion: 4  External rotation: 4  Internal rotation: 4-    Left Knee   Flexion: 5  Extension: 5    Right Knee   Flexion: 5  Extension: 5    Left Ankle/Foot   Dorsiflexion: 5  Plantar flexion: 5    Right Ankle/Foot   Dorsiflexion: 5  Plantar flexion: 5    Additional Strength Details  SLS L: 3 seconds  SLS R: 3 seconds    Flexibility:  - HS: mild restriction on R, minimal on L    Double leg Squat: to 70 deg knee flexion with mild lateral shift to the right with narrow base of support      Tests     Right Hip   Negative SI compression  Adalberto: Negative  Additional Tests Details  Lumbar ROM WFL with no pain reported               Precautions: HTN      Manuals 4-8 (IE)            Pin and stretch to right piriformis muscle NV            STM to right piriformis muscle with roller NV                                      Neuro Re-Ed             SLS  15" x 3 NV            BIODEX squats             Lateral cone walks                                                                 Ther Ex             NuStep 8 mins, L4 NV                         Seated:             - hamstring stretch 4 x 30" NV                                      Table:             - figure 4 piriformis stretch 4 x 30" NV            - ITB stretch with strap 4 x 30" NV            - bridges with TB around knees 20 x 5" RTB NV                                      Standing:             - hip abd 2 x 10 ea NV            - hip ext 2 x 10 ea NV                         Ther Activity             Front step ups             Lateral step ups             Squats                                       Gait Training                                       Modalities             Cryo/MH as needed                            * On initial evaluation, educated pt on anatomy, pathology, and exercise rationale  Provided pt with basic HEP and ensured proper exercise performance  Educated pt to call with any questions or concerns  Access Code: [email or web address]  URL: [email or web address]  Date: 04/08/2022  Prepared by: Diane Friendly    Exercises  · Supine Lower Trunk Rotation - 3 x daily - 10 reps - 2 seconds hold  · Supine Posterior Pelvic Tilt - 3 x daily - 10 reps - 10 seconds hold  · Supine Piriformis Stretch with Foot on Ground - 3 x daily - 4 reps - 30 seconds hold  · Seated Piriformis Stretch with Trunk Bend - 3 x daily - 4 reps - 30 seconds hold

## 2022-04-08 NOTE — LETTER
2022    MD Tavares Whittaker    Patient: Aida Smith   YOB: 1950   Date of Visit: 2022     Encounter Diagnosis     ICD-10-CM    1  Pain in right hip  M25 551        Dear Dr Arroyo Mainland: Thank you for your recent referral of Aida Smith  Please review the attached evaluation summary from Talita's recent visit  Please verify that you agree with the plan of care by signing the attached order  If you have any questions or concerns, please do not hesitate to call  I sincerely appreciate the opportunity to share in the care of one of your patients and hope to have another opportunity to work with you in the near future  Sincerely,    Ena Meehan, PT      Referring Provider:      I certify that I have read the below Plan of Care and certify the need for these services furnished under this plan of treatment while under my care  MD Tavares Whittaker  Via Fax: [phone]          PT Evaluation     Today's date: 2022  Patient name: Aida Smith  :   MRN: [id number]  Referring provider: Raman David*  Dx:   Encounter Diagnosis     ICD-10-CM    1  Pain in right hip  M25 551                   Assessment  Assessment details: Aida Smith is a pleasant 66 y o  presenting to physical therapy with MD referral for Pain in right hip  (primary encounter diagnosis)  Problem list:  Limited hip AROM, decreased hip/core strength, limited lower extremity flexibility, poor balance, and increased muscle tension    Treatment to include: Manual therapy techniques, lower extremity/core strengthening, neuromuscular control exercises, balance/proprioception training, and instruction in a comprehensive HEP, and modalities as needed       This pt would benefit from skilled PT services to address their impairments and functional limitations to maximize functional outcome  Symptom irritability: moderateBarriers to therapy: HTN,   Understanding of Dx/Px/POC: good   Prognosis: good    Goals  ST  Pt will improve hip IR to at least 45 degrees in 3 weeks  2  Pt will improve hip ER strength to at least 4+/5 in 3 weeks  LT  Pt will be able to stand > 30 minutes with minimal to no pain in 6 weeks  2  Pt will be independent in a comprehensive HEP in 6 weeks  Plan  Patient would benefit from: skilled physical therapy  Frequency: 2x week  Duration in weeks: 6  Treatment plan discussed with: patient        Subjective Evaluation    History of Present Illness  Mechanism of injury: Pt reports she has been getting injections in her right hip for many years off and on as needed  Pt states she experiences pain in right SIJ as well as across right gluteal region into lateral hip  Pt reports in 2020, she had a bunionectomy and needed to use a scooter  Pt states since that time the pain has increased  Pt saw benny who provided pt with lidocaine and kenalog injection in greater trochanter on 22 with good relief of greater trochanter pain  Pt states she feels an almost weakness in right SIJ when standing single leg on right       Premorbid status:  - ADLs: Independent with no difficulty  - Work: Not a working individual  - Recreation: [employer]    Current status:  - ADLs/Functional activities:   - Stairs Reciprocal pattern with Pain Levels: mild pain   - Sit to stand with mild pain   - Walking >30 minutes without increase in pain   - Standing 15-20 minutes prior to increase in pain   - Sitting 1 hour prior to increase in pain   - Sleeping with 0 nightly sleep disturbances due to pain   - making bed (twisting and pulling) with moderate pain    - Donning socks and shoes with leg crossed on opposite ankle with moderate pain  - Work: Not a working individual  - Recreation: occasionally doing silver sneakers  Pain  Current pain ratin  At best pain ratin  At worst pain ratin  Location: right gluteal region into lateral hip  Quality: dull ache  Progression: improved      Diagnostic Tests  X-ray: normal  Treatments  Previous treatment: injection treatment  Current treatment: injection treatment and physical therapy  Patient Goals  Patient goals for therapy: decreased pain, increased strength and improved balance          Objective     Palpation     Additional Palpation Details  TTP over right piriformis with increased muscle tension as compared to left  TTP over greater trochanter on R  Active Range of Motion   Left Hip   Flexion: 120 degrees   Extension: 18 degrees   Abduction: 40 degrees   Adduction: 30 degrees   External rotation (90/90): 32 degrees   Internal rotation (90/90): 35 degrees     Right Hip   Flexion: 120 degrees   Extension: 18 degrees   Abduction: 40 degrees   Adduction: 26 degrees   External rotation (90/90): 35 degrees with pain  Internal rotation (90/90): 32 degrees with pain    Passive Range of Motion   Left Hip   Flexion: 130 degrees     Right Hip   External rotation (90/90): 55 degrees   Internal rotation (90/90): 40 degrees     Strength/Myotome Testing     Left Hip   Planes of Motion   Flexion: 4  External rotation: 4  Internal rotation: 4    Right Hip   Planes of Motion   Flexion: 4  External rotation: 4  Internal rotation: 4-    Left Knee   Flexion: 5  Extension: 5    Right Knee   Flexion: 5  Extension: 5    Left Ankle/Foot   Dorsiflexion: 5  Plantar flexion: 5    Right Ankle/Foot   Dorsiflexion: 5  Plantar flexion: 5    Additional Strength Details  SLS L: 3 seconds  SLS R: 3 seconds    Flexibility:  - HS: mild restriction on R, minimal on L    Double leg Squat: to 70 deg knee flexion with mild lateral shift to the right with narrow base of support      Tests     Right Hip   Negative SI compression  Adalberto: Negative  Additional Tests Details  Lumbar ROM WFL with no pain reported               Precautions: HTN      Manuals 4-8 (IE)            Pin and stretch to right piriformis muscle NV            STM to right piriformis muscle with roller NV                                      Neuro Re-Ed             SLS  15" x 3 NV            BIODEX squats             Lateral cone walks                                                                 Ther Ex             NuStep 8 mins, L4 NV                         Seated:             - hamstring stretch 4 x 30" NV                                      Table:             - figure 4 piriformis stretch 4 x 30" NV            - ITB stretch with strap 4 x 30" NV            - bridges with TB around knees 20 x 5" RTB NV                                      Standing:             - hip abd 2 x 10 ea NV            - hip ext 2 x 10 ea NV                         Ther Activity             Front step ups             Lateral step ups             Squats                                       Gait Training                                       Modalities             Cryo/MH as needed                            * On initial evaluation, educated pt on anatomy, pathology, and exercise rationale  Provided pt with basic HEP and ensured proper exercise performance  Educated pt to call with any questions or concerns  Access Code: [email or web address]  URL: [email or web address]  Date: 04/08/2022  Prepared by: Oralia Llamas    Exercises  · Supine Lower Trunk Rotation - 3 x daily - 10 reps - 2 seconds hold  · Supine Posterior Pelvic Tilt - 3 x daily - 10 reps - 10 seconds hold  · Supine Piriformis Stretch with Foot on Ground - 3 x daily - 4 reps - 30 seconds hold  · Seated Piriformis Stretch with Trunk Bend - 3 x daily - 4 reps - 30 seconds hold

## 2022-04-11 ENCOUNTER — OFFICE VISIT (OUTPATIENT)
Dept: PHYSICAL THERAPY | Facility: CLINIC | Age: 72
End: 2022-04-11
Payer: MEDICARE

## 2022-04-11 DIAGNOSIS — M25.551 PAIN IN RIGHT HIP: Primary | ICD-10-CM

## 2022-04-11 PROCEDURE — 97140 MANUAL THERAPY 1/> REGIONS: CPT | Performed by: PHYSICAL THERAPIST

## 2022-04-11 PROCEDURE — 97110 THERAPEUTIC EXERCISES: CPT | Performed by: PHYSICAL THERAPIST

## 2022-04-11 NOTE — PROGRESS NOTES
Daily Note     Today's date: 2022  Patient name: Radha Ramirez  : [birth year]  MRN: [id number]  Referring provider: Gianna Brown*  Dx:   Encounter Diagnosis     ICD-10-CM    1  Pain in right hip  M25 551                   Subjective: Pt reports she has been performing her HEP and feels like "we are going in the right direction " Pt does states however, that she woke up 6 x last night due to cramping in her feet bilaterally  Objective: See treatment diary below      Assessment: Initiated exercises this date to address impairments noted during initial evaluation  Pt was able to tolerate all exercises without report of incresae in pain/discomfort  Tolerated treatment well  Patient demonstrated fatigue post treatment and would benefit from continued PT      Plan: Progress treatment as tolerated         Precautions: HTN      Manuals 4-8 (IE) 4-11           Pin and stretch to right piriformis muscle NV JG           STM to right piriformis muscle with roller NV JG                                     Neuro Re-Ed             SLS  15" x 3 NV 15" x 3 ea           BIODEX squats             Lateral cone walks                                                                 Ther Ex             NuStep 8 mins, L4 NV 8 mins, L3                        Seated:             - hamstring stretch 4 x 30" NV 4 x 30"                                     Table:             - figure 4 piriformis stretch 4 x 30" NV 4 x 30"           - ITB stretch with strap 4 x 30" NV            - bridges with TB around knees 20 x 5" RTB NV 20 x 5" RTB                                     Standing:             - hip abd 2 x 10 ea NV            - hip ext 2 x 10 ea NV 3 x 10 ea                        Ther Activity             Front step ups             Lateral step ups             Squats                                       Gait Training                                       Modalities             Cryo/MH as needed

## 2022-04-12 DIAGNOSIS — I10 ESSENTIAL HYPERTENSION: ICD-10-CM

## 2022-04-12 DIAGNOSIS — E78.01 FAMILIAL HYPERCHOLESTEROLEMIA: ICD-10-CM

## 2022-04-13 RX ORDER — AMLODIPINE BESYLATE AND BENAZEPRIL HYDROCHLORIDE 10; 40 MG/1; MG/1
1 CAPSULE ORAL DAILY
Qty: 90 CAPSULE | Refills: 0 | Status: SHIPPED | OUTPATIENT
Start: 2022-04-13 | End: 2022-04-13 | Stop reason: SDUPTHER

## 2022-04-13 RX ORDER — ROSUVASTATIN CALCIUM 10 MG/1
10 TABLET, COATED ORAL DAILY
Qty: 90 TABLET | Refills: 1 | Status: SHIPPED | OUTPATIENT
Start: 2022-04-13

## 2022-04-14 ENCOUNTER — OFFICE VISIT (OUTPATIENT)
Dept: PHYSICAL THERAPY | Facility: CLINIC | Age: 72
End: 2022-04-14
Payer: MEDICARE

## 2022-04-14 DIAGNOSIS — M25.551 PAIN IN RIGHT HIP: Primary | ICD-10-CM

## 2022-04-14 PROCEDURE — 97140 MANUAL THERAPY 1/> REGIONS: CPT | Performed by: PHYSICAL THERAPIST

## 2022-04-14 PROCEDURE — 97110 THERAPEUTIC EXERCISES: CPT | Performed by: PHYSICAL THERAPIST

## 2022-04-14 NOTE — PROGRESS NOTES
Daily Note     Today's date: 2022  Patient name: Maite Mendoza  : 89  MRN: [id number]  Referring provider: Paul Dey*  Dx:   Encounter Diagnosis     ICD-10-CM    1  Pain in right hip  M25 551                   Subjective: Pt reports she is feeling much better since initiating PT  Patient states she has been performing HEP 1 x per day at home  Objective: See treatment diary below      Assessment: Progressed exercises this session as charted to enhance hip/core strength/endurance  Tolerated treatment well  Patient demonstrated fatigue post treatment, exhibited good technique with therapeutic exercises and would benefit from continued PT      Plan: Progress treatment as tolerated         Precautions: HTN      Manuals 4-8 (IE) 4-11 4-14          Pin and stretch to right piriformis muscle NV JG JG          STM to right piriformis muscle with roller NV JG JG                                    Neuro Re-Ed             SLS  15" x 3 NV 15" x 3 ea 30" x 3 ea          BIODEX squats             Lateral cone walks                                                                 Ther Ex             NuStep 8 mins, L4 NV 8 mins, L3 10 mins, L4                       Seated:             - hamstring stretch 4 x 30" NV 4 x 30" 4 x 30"                                    Table:             - figure 4 piriformis stretch 4 x 30" NV 4 x 30" 4 x 30"          - ITB stretch with strap 4 x 30" NV  4 x 30"          - bridges with TB around knees 20 x 5" RTB NV 20 x 5" RTB 30 x 5" RTB                                    Standing:             - hip abd 2 x 10 ea NV  2 x 10 ea          - hip ext 2 x 10 ea NV 3 x 10 ea  2 x 10 ea                       Ther Activity             Front step ups   2 x 10 6" NV          Lateral step ups   2 x 10 6" NV          Squats    2 x 10 NV                                    Gait Training                                       Modalities             Cryo/MH as needed

## 2022-04-18 ENCOUNTER — OFFICE VISIT (OUTPATIENT)
Dept: PHYSICAL THERAPY | Facility: CLINIC | Age: 72
End: 2022-04-18
Payer: MEDICARE

## 2022-04-18 DIAGNOSIS — M25.551 PAIN IN RIGHT HIP: Primary | ICD-10-CM

## 2022-04-18 PROCEDURE — 97140 MANUAL THERAPY 1/> REGIONS: CPT | Performed by: PHYSICAL THERAPIST

## 2022-04-18 PROCEDURE — 97110 THERAPEUTIC EXERCISES: CPT | Performed by: PHYSICAL THERAPIST

## 2022-04-18 PROCEDURE — 97530 THERAPEUTIC ACTIVITIES: CPT | Performed by: PHYSICAL THERAPIST

## 2022-04-18 NOTE — PROGRESS NOTES
Daily Note     Today's date: 2022  Patient name: Jorge Isabel  :   MRN: [id number]  Referring provider: Gray Isidro*  Dx:   Encounter Diagnosis     ICD-10-CM    1  Pain in right hip  M25 551                   Subjective: Pt reports no increase in pain/soreness following previous session  Objective: See treatment diary below      Assessment: Progressed exercises this session to include functional strengthening exercises  Pt was able to tolerate all exercises this date with minimal increase in pain/discomfort  Tolerated treatment well  Patient demonstrated fatigue post treatment, exhibited good technique with therapeutic exercises and would benefit from continued PT      Plan: Progress treatment as tolerated         Precautions: HTN      Manuals 4-8 (IE) 4-11 4-14 4-18         Pin and stretch to right piriformis muscle NV JG EVANGELINAG JG         STM to right piriformis muscle with roller NV JG KAY JG                                   Neuro Re-Ed             SLS  15" x 3 NV 15" x 3 ea 30" x 3 ea 30" x 3 ea         BIODEX squats             Lateral cone walks                                                                 Ther Ex             NuStep 8 mins, L4 NV 8 mins, L3 10 mins, L4 10 mins, L4                      Seated:             - hamstring stretch 4 x 30" NV 4 x 30" 4 x 30" 2 x 30"                                    Table:             - figure 4 piriformis stretch 4 x 30" NV 4 x 30" 4 x 30"          - ITB stretch with strap 4 x 30" NV  4 x 30"          - bridges with TB around knees 20 x 5" RTB NV 20 x 5" RTB 30 x 5" RTB 30 x 5" RTB                                   Standing:             - hip abd 2 x 10 ea NV  2 x 10 ea 3 x 10 ea         - hip ext 2 x 10 ea NV 3 x 10 ea  2 x 10 ea  3 x 10 ea                      Ther Activity             Front step ups   2 x 10 6" NV 2 x 10 6"         Lateral step ups   2 x 10 6" NV 2 x 10 6"          Squats    2 x 10 NV  2 x 10 Gait Training                                       Modalities             Cryo/MH as needed

## 2022-04-18 NOTE — TELEPHONE ENCOUNTER
Patient is calling to make sure that amlodipine was sent to the pharmacy, [facility]  I did explain to her that both of them looked like they were sent in on the same day but at different times  She will be on the lookout for the other package tomorrow, otherwise she will call back for a refill  I will  leave this for the office since she will be calling to check for this medication there  It appears to have gone through okay

## 2022-04-21 ENCOUNTER — APPOINTMENT (OUTPATIENT)
Dept: PHYSICAL THERAPY | Facility: CLINIC | Age: 72
End: 2022-04-21
Payer: MEDICARE

## 2022-04-21 DIAGNOSIS — I10 ESSENTIAL HYPERTENSION: ICD-10-CM

## 2022-04-21 RX ORDER — AMLODIPINE BESYLATE AND BENAZEPRIL HYDROCHLORIDE 10; 40 MG/1; MG/1
1 CAPSULE ORAL DAILY
Qty: 90 CAPSULE | Refills: 0 | Status: SHIPPED | OUTPATIENT
Start: 2022-04-21 | End: 2022-06-23

## 2022-04-25 ENCOUNTER — APPOINTMENT (OUTPATIENT)
Dept: PHYSICAL THERAPY | Facility: CLINIC | Age: 72
End: 2022-04-25
Payer: MEDICARE

## 2022-04-28 ENCOUNTER — APPOINTMENT (OUTPATIENT)
Dept: PHYSICAL THERAPY | Facility: CLINIC | Age: 72
End: 2022-04-28
Payer: MEDICARE

## 2022-05-24 NOTE — PROGRESS NOTES
PT Discharge    Today's date: 2022  Patient name: Jena Joiner  :   MRN: [id number]  Referring provider: Kacie Avila*  Dx:   Encounter Diagnosis     ICD-10-CM    1  Pain in right hip  M25 551        Start Time: 1030  Stop Time: 1130  Total time in clinic (min): 60 minutes    Assessment  Assessment details: The patient had her PT IE on 22 and she was seen in PT for a total of 4 visits with her last treatment on 22  She had cancelled her next 5 appointments and did not return for more treatment  Unable to assess her current status, refer to her PT IE for her final assessment  She did not meet her goals secondary to being seen in PT for a limited number of visits  Unable to keep patient on hold, D/C PT secondary to script   D/C PT  Symptom irritability: moderateBarriers to therapy: HTN,   Understanding of Dx/Px/POC: good   Prognosis: good    Goals  ST  Pt will improve hip IR to at least 45 degrees in 3 weeks  - not met  2  Pt will improve hip ER strength to at least 4+/5 in 3 weeks  - not met    LT  Pt will be able to stand > 30 minutes with minimal to no pain in 6 weeks  - not met  2  Pt will be independent in a comprehensive HEP in 6 weeks  - not met    Plan  Plan details: Unable to keep patient on hold, D/C PT secondary to script   D/C PT  Subjective Evaluation    History of Present Illness  Mechanism of injury: Pt reports she has been getting injections in her right hip for many years off and on as needed  Pt states she experiences pain in right SIJ as well as across right gluteal region into lateral hip  Pt reports in 2020, she had a bunionectomy and needed to use a scooter  Pt states since that time the pain has increased  Pt saw benny who provided pt with lidocaine and kenalog injection in greater trochanter on 22 with good relief of greater trochanter pain   Pt states she feels an almost weakness in right SIJ when standing single leg on right  Premorbid status:  - ADLs: Independent with no difficulty  - Work: Not a working individual  - Recreation: silver sneakers    Current status:  - ADLs/Functional activities:   - Stairs Reciprocal pattern with Pain Levels: mild pain   - Sit to stand with mild pain   - Walking >30 minutes without increase in pain   - Standing 15-20 minutes prior to increase in pain   - Sitting 1 hour prior to increase in pain   - Sleeping with 0 nightly sleep disturbances due to pain   - making bed (twisting and pulling) with moderate pain    - Donning socks and shoes with leg crossed on opposite ankle with moderate pain  - Work: Not a working individual  - Recreation: occasionally doing silver [employer]  Pain  Current pain ratin  At best pain ratin  At worst pain ratin  Location: right gluteal region into lateral hip  Quality: dull ache  Progression: improved      Diagnostic Tests  X-ray: normal  Treatments  Previous treatment: injection treatment  Current treatment: injection treatment and physical therapy  Patient Goals  Patient goals for therapy: decreased pain, increased strength and improved balance          Objective     Palpation     Additional Palpation Details  TTP over right piriformis with increased muscle tension as compared to left  TTP over greater trochanter on R      Active Range of Motion   Left Hip   Flexion: 120 degrees   Extension: 18 degrees   Abduction: 40 degrees   Adduction: 30 degrees   External rotation (90/90): 32 degrees   Internal rotation (90/90): 35 degrees     Right Hip   Flexion: 120 degrees   Extension: 18 degrees   Abduction: 40 degrees   Adduction: 26 degrees   External rotation (90/90): 35 degrees with pain  Internal rotation (90/90): 32 degrees with pain    Passive Range of Motion   Left Hip   Flexion: 130 degrees     Right Hip   External rotation (90/90): 55 degrees   Internal rotation (90/90): 40 degrees     Strength/Myotome Testing     Left Hip Planes of Motion   Flexion: 4  External rotation: 4  Internal rotation: 4    Right Hip   Planes of Motion   Flexion: 4  External rotation: 4  Internal rotation: 4-    Left Knee   Flexion: 5  Extension: 5    Right Knee   Flexion: 5  Extension: 5    Left Ankle/Foot   Dorsiflexion: 5  Plantar flexion: 5    Right Ankle/Foot   Dorsiflexion: 5  Plantar flexion: 5    Additional Strength Details  SLS L: 3 seconds  SLS R: 3 seconds    Flexibility:  - HS: mild restriction on R, minimal on L    Double leg Squat: to 70 deg knee flexion with mild lateral shift to the right with narrow base of support      Tests     Right Hip   Negative SI compression  Adalberto: Negative  Additional Tests Details  Lumbar ROM WFL with no pain reported

## 2022-06-23 DIAGNOSIS — I10 ESSENTIAL HYPERTENSION: ICD-10-CM

## 2022-06-23 RX ORDER — AMLODIPINE BESYLATE AND BENAZEPRIL HYDROCHLORIDE 10; 40 MG/1; MG/1
1 CAPSULE ORAL DAILY
Qty: 90 CAPSULE | Refills: 0 | Status: SHIPPED | OUTPATIENT
Start: 2022-06-23 | End: 2022-07-15 | Stop reason: SDUPTHER

## 2022-07-01 ENCOUNTER — HOSPITAL ENCOUNTER (OUTPATIENT)
Dept: MAMMOGRAPHY | Facility: HOSPITAL | Age: 72
Discharge: HOME | End: 2022-07-01
Payer: MEDICARE

## 2022-07-01 VITALS — WEIGHT: 165 LBS | BODY MASS INDEX: 27.49 KG/M2 | HEIGHT: 65 IN

## 2022-07-01 DIAGNOSIS — Z12.31 ENCOUNTER FOR SCREENING MAMMOGRAM FOR MALIGNANT NEOPLASM OF BREAST: ICD-10-CM

## 2022-07-01 PROCEDURE — 77067 SCR MAMMO BI INCL CAD: CPT

## 2022-07-01 PROCEDURE — 77063 BREAST TOMOSYNTHESIS BI: CPT

## 2022-07-11 ENCOUNTER — PATIENT MESSAGE (OUTPATIENT)
Dept: FAMILY MEDICINE CLINIC | Facility: CLINIC | Age: 72
End: 2022-07-11

## 2022-07-15 DIAGNOSIS — I10 ESSENTIAL HYPERTENSION: ICD-10-CM

## 2022-07-15 DIAGNOSIS — F51.01 PRIMARY INSOMNIA: ICD-10-CM

## 2022-07-16 RX ORDER — AMLODIPINE BESYLATE AND BENAZEPRIL HYDROCHLORIDE 10; 40 MG/1; MG/1
1 CAPSULE ORAL DAILY
Qty: 90 CAPSULE | Refills: 0 | Status: SHIPPED | OUTPATIENT
Start: 2022-07-16 | End: 2022-08-29 | Stop reason: SDUPTHER

## 2022-07-18 RX ORDER — ZOLPIDEM TARTRATE 10 MG/1
TABLET ORAL
Qty: 90 TABLET | Refills: 0 | Status: SHIPPED | OUTPATIENT
Start: 2022-07-18

## 2022-08-15 ENCOUNTER — RA CDI HCC (OUTPATIENT)
Dept: OTHER | Facility: HOSPITAL | Age: 72
End: 2022-08-15

## 2022-08-15 NOTE — PROGRESS NOTES
[address]  coding opportunities       Chart reviewed, no opportunity found: CHART REVIEWED, NO OPPORTUNITY FOUND        Patients Insurance     Medicare Insurance: Medicare

## 2022-08-22 ENCOUNTER — APPOINTMENT (OUTPATIENT)
Dept: LAB | Facility: CLINIC | Age: 72
End: 2022-08-22
Payer: MEDICARE

## 2022-08-22 DIAGNOSIS — Z13.1 SCREENING FOR DIABETES MELLITUS: ICD-10-CM

## 2022-08-22 DIAGNOSIS — E03.8 SUBCLINICAL HYPOTHYROIDISM: ICD-10-CM

## 2022-08-22 DIAGNOSIS — Z13.220 SCREENING FOR HYPERLIPIDEMIA: ICD-10-CM

## 2022-08-22 DIAGNOSIS — Z13.0 SCREENING FOR DEFICIENCY ANEMIA: ICD-10-CM

## 2022-08-22 LAB
ALBUMIN SERPL BCP-MCNC: 3.9 G/DL (ref 3.5–5)
ALP SERPL-CCNC: 58 U/L (ref 46–116)
ALT SERPL W P-5'-P-CCNC: 26 U/L (ref 12–78)
ANION GAP SERPL CALCULATED.3IONS-SCNC: 4 MMOL/L (ref 4–13)
AST SERPL W P-5'-P-CCNC: 22 U/L (ref 5–45)
BASOPHILS # BLD AUTO: 0.04 THOUSANDS/ΜL (ref 0–0.1)
BASOPHILS NFR BLD AUTO: 1 % (ref 0–1)
BILIRUB SERPL-MCNC: 0.37 MG/DL (ref 0.2–1)
BUN SERPL-MCNC: 29 MG/DL (ref 5–25)
CALCIUM SERPL-MCNC: 9.5 MG/DL (ref 8.3–10.1)
CHLORIDE SERPL-SCNC: 110 MMOL/L (ref 96–108)
CHOLEST SERPL-MCNC: 138 MG/DL
CO2 SERPL-SCNC: 27 MMOL/L (ref 21–32)
CREAT SERPL-MCNC: 1.16 MG/DL (ref 0.6–1.3)
EOSINOPHIL # BLD AUTO: 0.23 THOUSAND/ΜL (ref 0–0.61)
EOSINOPHIL NFR BLD AUTO: 3 % (ref 0–6)
ERYTHROCYTE [DISTWIDTH] IN BLOOD BY AUTOMATED COUNT: 13.3 % (ref 11.6–15.1)
GFR SERPL CREATININE-BSD FRML MDRD: 46 ML/MIN/1.73SQ M
GLUCOSE P FAST SERPL-MCNC: 107 MG/DL (ref 65–99)
HCT VFR BLD AUTO: 40.8 % (ref 34.8–46.1)
HDLC SERPL-MCNC: 64 MG/DL
HGB BLD-MCNC: 12.8 G/DL (ref 11.5–15.4)
IMM GRANULOCYTES # BLD AUTO: 0.02 THOUSAND/UL (ref 0–0.2)
IMM GRANULOCYTES NFR BLD AUTO: 0 % (ref 0–2)
LDLC SERPL CALC-MCNC: 59 MG/DL (ref 0–100)
LYMPHOCYTES # BLD AUTO: 3.16 THOUSANDS/ΜL (ref 0.6–4.47)
LYMPHOCYTES NFR BLD AUTO: 43 % (ref 14–44)
MCH RBC QN AUTO: 31.1 PG (ref 26.8–34.3)
MCHC RBC AUTO-ENTMCNC: 31.4 G/DL (ref 31.4–37.4)
MCV RBC AUTO: 99 FL (ref 82–98)
MONOCYTES # BLD AUTO: 0.82 THOUSAND/ΜL (ref 0.17–1.22)
MONOCYTES NFR BLD AUTO: 11 % (ref 4–12)
NEUTROPHILS # BLD AUTO: 3.17 THOUSANDS/ΜL (ref 1.85–7.62)
NEUTS SEG NFR BLD AUTO: 42 % (ref 43–75)
NRBC BLD AUTO-RTO: 0 /100 WBCS
PLATELET # BLD AUTO: 293 THOUSANDS/UL (ref 149–390)
PMV BLD AUTO: 9.9 FL (ref 8.9–12.7)
POTASSIUM SERPL-SCNC: 4 MMOL/L (ref 3.5–5.3)
PROT SERPL-MCNC: 7.5 G/DL (ref 6.4–8.4)
RBC # BLD AUTO: 4.12 MILLION/UL (ref 3.81–5.12)
SODIUM SERPL-SCNC: 141 MMOL/L (ref 135–147)
T4 FREE SERPL-MCNC: 0.91 NG/DL (ref 0.76–1.46)
TRIGL SERPL-MCNC: 74 MG/DL
TSH SERPL DL<=0.05 MIU/L-ACNC: 3.61 UIU/ML (ref 0.45–4.5)
WBC # BLD AUTO: 7.44 THOUSAND/UL (ref 4.31–10.16)

## 2022-08-22 PROCEDURE — 84443 ASSAY THYROID STIM HORMONE: CPT

## 2022-08-22 PROCEDURE — 80061 LIPID PANEL: CPT

## 2022-08-22 PROCEDURE — 85025 COMPLETE CBC W/AUTO DIFF WBC: CPT

## 2022-08-22 PROCEDURE — 36415 COLL VENOUS BLD VENIPUNCTURE: CPT

## 2022-08-22 PROCEDURE — 84439 ASSAY OF FREE THYROXINE: CPT

## 2022-08-22 PROCEDURE — 80053 COMPREHEN METABOLIC PANEL: CPT

## 2022-08-29 ENCOUNTER — OFFICE VISIT (OUTPATIENT)
Dept: FAMILY MEDICINE CLINIC | Facility: CLINIC | Age: 72
End: 2022-08-29
Payer: MEDICARE

## 2022-08-29 VITALS
WEIGHT: 163 LBS | HEART RATE: 80 BPM | SYSTOLIC BLOOD PRESSURE: 138 MMHG | DIASTOLIC BLOOD PRESSURE: 86 MMHG | OXYGEN SATURATION: 98 % | BODY MASS INDEX: 27.16 KG/M2 | HEIGHT: 65 IN | TEMPERATURE: 96.4 F

## 2022-08-29 DIAGNOSIS — M79.671 FOOT PAIN, BILATERAL: ICD-10-CM

## 2022-08-29 DIAGNOSIS — Z13.31 DEPRESSION SCREENING NEGATIVE: ICD-10-CM

## 2022-08-29 DIAGNOSIS — E03.8 SUBCLINICAL HYPOTHYROIDISM: Primary | ICD-10-CM

## 2022-08-29 DIAGNOSIS — I10 ESSENTIAL HYPERTENSION: ICD-10-CM

## 2022-08-29 DIAGNOSIS — E78.01 FAMILIAL HYPERCHOLESTEROLEMIA: ICD-10-CM

## 2022-08-29 DIAGNOSIS — M79.672 FOOT PAIN, BILATERAL: ICD-10-CM

## 2022-08-29 PROCEDURE — 99214 OFFICE O/P EST MOD 30 MIN: CPT | Performed by: NURSE PRACTITIONER

## 2022-08-29 RX ORDER — ROSUVASTATIN CALCIUM 10 MG/1
10 TABLET, COATED ORAL DAILY
Qty: 90 TABLET | Refills: 1 | Status: SHIPPED | OUTPATIENT
Start: 2022-08-29

## 2022-08-29 RX ORDER — AMLODIPINE BESYLATE AND BENAZEPRIL HYDROCHLORIDE 10; 40 MG/1; MG/1
1 CAPSULE ORAL DAILY
Qty: 90 CAPSULE | Refills: 1 | Status: SHIPPED | OUTPATIENT
Start: 2022-08-29 | End: 2022-10-20 | Stop reason: SDUPTHER

## 2022-08-29 RX ORDER — ASCORBATE CALCIUM 500 MG
500 TABLET ORAL DAILY
COMMUNITY

## 2022-08-29 NOTE — PROGRESS NOTES
Assessment/Plan:    Problem List Items Addressed This Visit     Familial hypercholesterolemia    Relevant Medications    rosuvastatin (CRESTOR) 10 MG tablet    Foot pain, bilateral    Relevant Medications    diclofenac sodium (VOLTAREN) 50 mg EC tablet    Essential hypertension    Relevant Medications    amLODIPine-benazepril (LOTREL) 10-40 MG per capsule    Subclinical hypothyroidism - Primary      Other Visit Diagnoses     Depression screening negative               Diagnoses and all orders for this visit:    Subclinical hypothyroidism    Familial hypercholesterolemia  -     rosuvastatin (CRESTOR) 10 MG tablet; Take 1 tablet (10 mg total) by mouth daily    Essential hypertension  -     amLODIPine-benazepril (LOTREL) 10-40 MG per capsule; Take 1 capsule by mouth daily    Foot pain, bilateral  -     diclofenac sodium (VOLTAREN) 50 mg EC tablet; Take 1 tablet (50 mg total) by mouth 2 (two) times a day Take with food- pt taking daily    Depression screening negative    Other orders  -     Calcium Ascorbate (VITAMIN C) 500 mg tablet; Take 500 mg by mouth daily      No problem-specific Assessment & Plan notes found for this encounter  Subjective:      Patient ID: Rachael Meehan is a 66 y o  female  Hypertension  This is a chronic problem  The problem is controlled  There are no associated agents to hypertension  Risk factors for coronary artery disease include post-menopausal state  Past treatments include calcium channel blockers and ACE inhibitors  The current treatment provides significant improvement  There are no compliance problems  There is no history of angina or CAD/MI  Identifiable causes of hypertension include a thyroid problem  Hyperlipidemia  This is a chronic problem  The problem is controlled  There are no known factors aggravating her hyperlipidemia  Current antihyperlipidemic treatment includes statins  There are no compliance problems    Risk factors for coronary artery disease include dyslipidemia and post-menopausal    Thyroid Problem  Presents for follow-up visit  The symptoms have been stable  Her past medical history is significant for hyperlipidemia  The following portions of the patient's history were reviewed and updated as appropriate:   She has a past medical history of Arthritis, Cataract, Hallux valgus of right foot, Hyperlipidemia, Hypertension, RLL pneumonia, Skin rash (11/30/2016), and Wears glasses  ,  does not have any pertinent problems on file  ,   has a past surgical history that includes Bladder suspension; Eye surgery (Right); Colonoscopy; [place] tooth extraction; and pr corrj hallux valgus w/sesmdc w/dist metar osteot (Right, 9/29/2020)  ,  family history includes Colon cancer in her family; Colon cancer (age of onset: 67) in her mother; Coronary artery disease in her family; Diabetes in her father and mother; Diabetes type II in her family; Heart disease in her father and mother; No Known Problems in her daughter, maternal grandfather, maternal grandmother, paternal aunt, paternal grandfather, paternal grandmother, sister, and son ,   reports that she has never smoked  She has never used smokeless tobacco  She reports current alcohol use  She reports that she does not use drugs  ,  has No Known Allergies     Current Outpatient Medications   Medication Sig Dispense Refill    amLODIPine-benazepril (LOTREL) 10-40 MG per capsule Take 1 capsule by mouth daily 90 capsule 1    ammonium lactate (LAC-HYDRIN) 12 % lotion APPLY TO DRY SKIN TWICE DAILY  RUB IN THOROUGHLY   AVOID OPEN SKIN      Calcium Ascorbate (VITAMIN C) 500 mg tablet Take 500 mg by mouth daily      Calcium Carbonate-Vitamin D 600-200 MG-UNIT CAPS Take 1 capsule by mouth daily      cholecalciferol (VITAMIN D3) 1,000 units tablet Take 1,000 Units by mouth daily      cyanocobalamin (VITAMIN B-12) 500 mcg tablet Take 500 mcg by mouth daily      diclofenac sodium (VOLTAREN) 1 % APPLY 2 GRAMS TOPICALLY 4  (FOUR) TIMES A  g 2    diclofenac sodium (VOLTAREN) 50 mg EC tablet Take 1 tablet (50 mg total) by mouth 2 (two) times a day Take with food- pt taking daily 180 tablet 1    rosuvastatin (CRESTOR) 10 MG tablet Take 1 tablet (10 mg total) by mouth daily 90 tablet 1    zolpidem (AMBIEN) 10 mg tablet TAKE 1 TABLET BY MOUTH  EVERY NIGHT AT BEDTIME AS  NEEDED FOR INSOMNIA 90 tablet 0     No current facility-administered medications for this visit  Depression Screening and Follow-up Plan: Patient was screened for depression during today's encounter  They screened negative with a PHQ-2 score of 0  Review of Systems   Constitutional: Negative  HENT: Negative  Eyes: Negative  Respiratory: Negative  Cardiovascular: Negative  Gastrointestinal: Negative  Endocrine: Negative  Genitourinary: Negative  Musculoskeletal: Negative  Skin: Negative  Allergic/Immunologic: Negative  Neurological: Negative  Hematological: Negative  Psychiatric/Behavioral: Negative  Objective:  Vitals:    08/29/22 0823   BP: 138/86   BP Location: Left arm   Patient Position: Sitting   Cuff Size: Adult   Pulse: 80   Temp: (!) 96 4 °F (35 8 °C)   TempSrc: Tympanic   SpO2: 98%   Weight: 73 9 kg (163 lb)   Height: 5' 5" (1 651 m)     Body mass index is 27 12 kg/m²  Physical Exam  Vitals and nursing note reviewed  Constitutional:       Appearance: Normal appearance  She is well-developed  Interventions: Face mask in place  HENT:      Head: Normocephalic and atraumatic  Right Ear: Tympanic membrane, ear canal and external ear normal       Left Ear: Tympanic membrane, ear canal and external ear normal       Nose: Nose normal       Mouth/Throat:      Mouth: Mucous membranes are moist       Pharynx: Uvula midline  Eyes:      General: Lids are normal       Conjunctiva/sclera: Conjunctivae normal       Pupils: Pupils are equal, round, and reactive to light     Neck:      Thyroid: No thyroid mass or thyromegaly  Vascular: No JVD  Trachea: Trachea and phonation normal    Cardiovascular:      Rate and Rhythm: Normal rate and regular rhythm  Pulses: Normal pulses  Heart sounds: Normal heart sounds, S1 normal and S2 normal  No murmur heard  No friction rub  No gallop  Pulmonary:      Effort: Pulmonary effort is normal       Breath sounds: Normal breath sounds  Abdominal:      General: Bowel sounds are normal       Palpations: Abdomen is soft  Tenderness: There is no abdominal tenderness  Genitourinary:     Comments: Deferred   Musculoskeletal:         General: Normal range of motion  Cervical back: Full passive range of motion without pain, normal range of motion and neck supple  Right lower leg: No edema  Left lower leg: No edema  Lymphadenopathy:      Head:      Right side of head: No submental, submandibular, tonsillar, preauricular, posterior auricular or occipital adenopathy  Left side of head: No submental, submandibular, tonsillar, preauricular, posterior auricular or occipital adenopathy  Cervical: No cervical adenopathy  Skin:     General: Skin is warm and dry  Capillary Refill: Capillary refill takes less than 2 seconds  Neurological:      General: No focal deficit present  Mental Status: She is alert and oriented to person, place, and time  Cranial Nerves: Cranial nerves are intact  No cranial nerve deficit  Sensory: Sensation is intact  Motor: Motor function is intact  Coordination: Coordination is intact  Gait: Gait is intact  Deep Tendon Reflexes: Reflexes are normal and symmetric  Psychiatric:         Attention and Perception: Attention and perception normal          Mood and Affect: Mood and affect normal          Speech: Speech normal          Behavior: Behavior normal  Behavior is cooperative  Thought Content:  Thought content normal          Cognition and Memory: Cognition normal          Judgment: Judgment normal          Appointment on 08/22/2022   Component Date Value Ref Range Status    Cholesterol 08/22/2022 138  See Comment mg/dL Final    Cholesterol:         Pediatric <18 Years        Desirable          <170 mg/dL      Borderline High    170-199 mg/dL      High               >=200 mg/dL        Adult >=18 Years            Desirable         <200 mg/dL      Borderline High   200-239 mg/dL      High              >239 mg/dL      Triglycerides 08/22/2022 74  See Comment mg/dL Final    Triglyceride:     0-9Y            <75mg/dL     10Y-17Y         <90 mg/dL       >=18Y     Normal          <150 mg/dL     Borderline High 150-199 mg/dL     High            200-499 mg/dL        Very High       >499 mg/dL    Specimen collection should occur prior to N-Acetylcysteine or Metamizole administration due to the potential for falsely depressed results   HDL, Direct 08/22/2022 64  >=50 mg/dL Final    LDL Calculated 08/22/2022 59  0 - 100 mg/dL Final    This screening LDL is a calculated result  It does not have the accuracy of the Direct Measured LDL in the monitoring of patients with hyperlipidemia and/or statin therapy  Direct Measure LDL (CPI950) must be ordered separately in these patients    LDL Cholesterol:     Optimal           <100 mg/dl     Near Optimal      100-129 mg/dl     Above Optimal       Borderline High 130-159 mg/dl       High            160-189 mg/dl       Very High       >189 mg/dl           WBC 08/22/2022 7 44  4 31 - 10 16 Thousand/uL Final    RBC 08/22/2022 4 12  3 81 - 5 12 Million/uL Final    Hemoglobin 08/22/2022 12 8  11 5 - 15 4 g/dL Final    Hematocrit 08/22/2022 40 8  34 8 - 46 1 % Final    MCV 08/22/2022 99 (A) 82 - 98 fL Final    MCH 08/22/2022 31 1  26 8 - 34 3 pg Final    MCHC 08/22/2022 31 4  31 4 - 37 4 g/dL Final    RDW 08/22/2022 13 3  11 6 - 15 1 % Final    MPV 08/22/2022 9 9  8 9 - 12 7 fL Final    Platelets 60/25/8736 293  149 - 390 Thousands/uL Final    nRBC 08/22/2022 0  /100 WBCs Final    Neutrophils Relative 08/22/2022 42 (A) 43 - 75 % Final    Immat GRANS % 08/22/2022 0  0 - 2 % Final    Lymphocytes Relative 08/22/2022 43  14 - 44 % Final    Monocytes Relative 08/22/2022 11  4 - 12 % Final    Eosinophils Relative 08/22/2022 3  0 - 6 % Final    Basophils Relative 08/22/2022 1  0 - 1 % Final    Neutrophils Absolute 08/22/2022 3 17  1 85 - 7 62 Thousands/µL Final    Immature Grans Absolute 08/22/2022 0 02  0 00 - 0 20 Thousand/uL Final    Lymphocytes Absolute 08/22/2022 3 16  0 60 - 4 47 Thousands/µL Final    Monocytes Absolute 08/22/2022 0 82  0 17 - 1 22 Thousand/µL Final    Eosinophils Absolute 08/22/2022 0 23  0 00 - 0 61 Thousand/µL Final    Basophils Absolute 08/22/2022 0 04  0 00 - 0 10 Thousands/µL Final    Sodium 08/22/2022 141  135 - 147 mmol/L Final    Potassium 08/22/2022 4 0  3 5 - 5 3 mmol/L Final    Chloride 08/22/2022 110 (A) 96 - 108 mmol/L Final    CO2 08/22/2022 27  21 - 32 mmol/L Final    ANION GAP 08/22/2022 4  4 - 13 mmol/L Final    BUN 08/22/2022 29 (A) 5 - 25 mg/dL Final    Creatinine 08/22/2022 1 16  0 60 - 1 30 mg/dL Final    Standardized to IDMS reference method    Glucose, Fasting 08/22/2022 107 (A) 65 - 99 mg/dL Final    Specimen collection should occur prior to Sulfasalazine administration due to the potential for falsely depressed results  Specimen collection should occur prior to Sulfapyridine administration due to the potential for falsely elevated results   Calcium 08/22/2022 9 5  8 3 - 10 1 mg/dL Final    AST 08/22/2022 22  5 - 45 U/L Final    Specimen collection should occur prior to Sulfasalazine administration due to the potential for falsely depressed results   ALT 08/22/2022 26  12 - 78 U/L Final    Specimen collection should occur prior to Sulfasalazine and/or Sulfapyridine administration due to the potential for falsely depressed results       Alkaline Phosphatase 08/22/2022 58  46 - 116 U/L Final    Total Protein 08/22/2022 7 5  6 4 - 8 4 g/dL Final    Albumin 08/22/2022 3 9  3 5 - 5 0 g/dL Final    Total Bilirubin 08/22/2022 0 37  0 20 - 1 00 mg/dL Final    Use of this assay is not recommended for patients undergoing treatment with eltrombopag due to the potential for falsely elevated results   eGFR 08/22/2022 46  ml/min/1 73sq m Final    TSH 3RD GENERATON 08/22/2022 3 610  0 450 - 4 500 uIU/mL Final    The recommended reference ranges for TSH during pregnancy are as follows:   First trimester 0 1 to 2 5 uIU/mL   Second trimester  0 2 to 3 0 uIU/mL   Third trimester 0 3 to 3 0 uIU/m    Note: Normal ranges may not apply to patients who are transgender, non-binary, or whose legal sex, sex at birth, and gender identity differ  Adult TSH (3rd generation) reference range follows the recommended guidelines of the [employer] Association, January, 2020   Free T4 08/22/2022 0 91  0 76 - 1 46 ng/dL Final    Specimen collection should occur prior to Sulfasalazine administration due to the potential for falsely elevated results  I have reviewed all the lab results  There are some abnormalities that are not critical to the patient's health, I have discussed these in person at this office appointment

## 2022-09-14 DIAGNOSIS — M79.671 FOOT PAIN, BILATERAL: ICD-10-CM

## 2022-09-14 DIAGNOSIS — M79.672 FOOT PAIN, BILATERAL: ICD-10-CM

## 2022-10-19 ENCOUNTER — IMMUNIZATIONS (OUTPATIENT)
Dept: FAMILY MEDICINE CLINIC | Facility: CLINIC | Age: 72
End: 2022-10-19
Payer: MEDICARE

## 2022-10-19 DIAGNOSIS — Z23 ENCOUNTER FOR IMMUNIZATION: Primary | ICD-10-CM

## 2022-10-19 PROCEDURE — 90662 IIV NO PRSV INCREASED AG IM: CPT

## 2022-10-19 PROCEDURE — G0008 ADMIN INFLUENZA VIRUS VAC: HCPCS

## 2022-10-20 DIAGNOSIS — I10 ESSENTIAL HYPERTENSION: ICD-10-CM

## 2022-10-20 RX ORDER — AMLODIPINE BESYLATE AND BENAZEPRIL HYDROCHLORIDE 10; 40 MG/1; MG/1
1 CAPSULE ORAL DAILY
Qty: 90 CAPSULE | Refills: 1 | Status: SHIPPED | OUTPATIENT
Start: 2022-10-20

## 2022-12-23 DIAGNOSIS — F51.01 PRIMARY INSOMNIA: ICD-10-CM

## 2022-12-25 RX ORDER — ZOLPIDEM TARTRATE 10 MG/1
TABLET ORAL
Qty: 90 TABLET | Refills: 0 | Status: SHIPPED | OUTPATIENT
Start: 2022-12-25

## 2023-03-08 ENCOUNTER — OFFICE VISIT (OUTPATIENT)
Dept: FAMILY MEDICINE CLINIC | Facility: CLINIC | Age: 73
End: 2023-03-08

## 2023-03-08 VITALS
SYSTOLIC BLOOD PRESSURE: 124 MMHG | HEIGHT: 65 IN | DIASTOLIC BLOOD PRESSURE: 78 MMHG | OXYGEN SATURATION: 94 % | WEIGHT: 168 LBS | BODY MASS INDEX: 27.99 KG/M2 | TEMPERATURE: 97.1 F | HEART RATE: 74 BPM

## 2023-03-08 DIAGNOSIS — Z13.1 SCREENING FOR DIABETES MELLITUS: ICD-10-CM

## 2023-03-08 DIAGNOSIS — F51.01 PRIMARY INSOMNIA: ICD-10-CM

## 2023-03-08 DIAGNOSIS — Z00.00 ENCOUNTER FOR MEDICARE ANNUAL WELLNESS EXAM: Primary | ICD-10-CM

## 2023-03-08 DIAGNOSIS — I10 ESSENTIAL HYPERTENSION: ICD-10-CM

## 2023-03-08 DIAGNOSIS — E03.8 SUBCLINICAL HYPOTHYROIDISM: ICD-10-CM

## 2023-03-08 DIAGNOSIS — M79.671 FOOT PAIN, BILATERAL: ICD-10-CM

## 2023-03-08 DIAGNOSIS — E78.01 FAMILIAL HYPERCHOLESTEROLEMIA: ICD-10-CM

## 2023-03-08 DIAGNOSIS — M79.672 FOOT PAIN, BILATERAL: ICD-10-CM

## 2023-03-08 DIAGNOSIS — Z13.0 SCREENING FOR DEFICIENCY ANEMIA: ICD-10-CM

## 2023-03-08 RX ORDER — ROSUVASTATIN CALCIUM 10 MG/1
10 TABLET, COATED ORAL DAILY
Qty: 90 TABLET | Refills: 1 | Status: SHIPPED | OUTPATIENT
Start: 2023-03-08

## 2023-03-08 RX ORDER — AMLODIPINE BESYLATE AND BENAZEPRIL HYDROCHLORIDE 10; 40 MG/1; MG/1
1 CAPSULE ORAL DAILY
Qty: 90 CAPSULE | Refills: 1 | Status: SHIPPED | OUTPATIENT
Start: 2023-03-08

## 2023-03-08 RX ORDER — ZOLPIDEM TARTRATE 10 MG/1
TABLET ORAL
Qty: 90 TABLET | Refills: 0 | Status: SHIPPED | OUTPATIENT
Start: 2023-03-08

## 2023-03-08 NOTE — PROGRESS NOTES
Assessment and Plan:     Problem List Items Addressed This Visit     Familial hypercholesterolemia    Relevant Medications    rosuvastatin (CRESTOR) 10 MG tablet    Other Relevant Orders    Lipid Panel with Direct LDL reflex    Foot pain, bilateral    Essential hypertension    Relevant Medications    amLODIPine-benazepril (LOTREL) 10-40 MG per capsule    Primary insomnia    Relevant Medications    zolpidem (AMBIEN) 10 mg tablet    Subclinical hypothyroidism    Relevant Orders    TSH, 3rd generation with Free T4 reflex   Other Visit Diagnoses     Encounter for Medicare annual wellness exam    -  Primary    Screening for deficiency anemia        Relevant Orders    CBC and differential    Screening for diabetes mellitus        Relevant Orders    Comprehensive metabolic panel        BMI Counseling: Body mass index is 27 96 kg/m²  The BMI is above normal  Nutrition recommendations include decreasing portion sizes, encouraging healthy choices of fruits and vegetables, decreasing fast food intake, consuming healthier snacks, limiting drinks that contain sugar, moderation in carbohydrate intake, increasing intake of lean protein, reducing intake of saturated and trans fat and reducing intake of cholesterol  Exercise recommendations include exercising 3-5 times per week  No pharmacotherapy was ordered  Rationale for BMI follow-up plan is due to patient being overweight or obese  Preventive health issues were discussed with patient, and age appropriate screening tests were ordered as noted in patient's After Visit Summary  Personalized health advice and appropriate referrals for health education or preventive services given if needed, as noted in patient's After Visit Summary  History of Present Illness:     Patient presents for a Medicare Wellness Visit    Hypertension  This is a chronic problem  The problem is controlled  There are no associated agents to hypertension   Risk factors for coronary artery disease include post-menopausal state  Past treatments include ACE inhibitors and calcium channel blockers  The current treatment provides significant improvement  There are no compliance problems  There is no history of angina, CAD/MI or heart failure  There is no history of hyperaldosteronism, hyperparathyroidism, a hypertension causing med, renovascular disease or a thyroid problem  Patient Care Team:  Rodrigo Gomez as PCP - General (Internal Medicine)  Rochelle العلي MD     Review of Systems:     Review of Systems   All other systems reviewed and are negative         Problem List:     Patient Active Problem List   Diagnosis   • Osteoarthrosis   • Familial hypercholesterolemia   • Foot pain, bilateral   • Essential hypertension   • Primary insomnia   • Actinic keratoses   • Allergic rhinitis   • Awakens from sleep at night   • Other dyschromia   • Disease of sebaceous glands   • Subclinical hypothyroidism   • Trigger finger   • Bunion of great toe of right foot   • Right hip pain   • Varicose veins of both legs with edema      Past Medical and Surgical History:     Past Medical History:   Diagnosis Date   • Arthritis    • Cataract     starting   • Hallux valgus of right foot    • Hyperlipidemia    • Hypertension    • RLL pneumonia     Last Assessed:11/4/14   • Skin rash 11/30/2016   • Wears glasses      Past Surgical History:   Procedure Laterality Date   • BLADDER SUSPENSION     • COLONOSCOPY     • EYE SURGERY Right     Retinal Detachment complex   • OR CORRJ HALLUX VALGUS W/SESMDC W/DIST METAR OSTEOT Right 9/29/2020    Procedure: Lilian Marlow;  Surgeon: Yvonne Boo DPM;  Location: [facility] OR;  Service: Podiatry   • WISDOM TOOTH EXTRACTION        Family History:     Family History   Problem Relation Age of Onset   • Heart disease Mother         Coronary disease   • Colon cancer Mother 67   • Diabetes Mother    • Heart disease Father         Coronary disease   • Diabetes Father    • No Known Problems Sister    • No Known Problems Daughter    • No Known Problems Maternal Grandmother    • No Known Problems Maternal Grandfather    • No Known Problems Paternal Grandmother    • No Known Problems Paternal Grandfather    • No Known Problems Son    • Colon cancer Family    • Coronary artery disease Family    • Diabetes type II Family    • No Known Problems Paternal Aunt       Social History:     Social History     Socioeconomic History   • Marital status: /Civil Union     Spouse name: None   • Number of children: None   • Years of education: None   • Highest education level: None   Occupational History   • Occupation: RETIRED   Tobacco Use   • Smoking status: Never   • Smokeless tobacco: Never   Vaping Use   • Vaping Use: Never used   Substance and Sexual Activity   • Alcohol use: Yes     Comment: 1 beer a week   • Drug use: No   • Sexual activity: None   Other Topics Concern   • None   Social History Narrative        Retired-Teacher     Social Determinants of Health     Financial Resource Strain: Low Risk    • Difficulty of Paying Living Expenses: Not hard at all   Food Insecurity: Not on file   Transportation Needs: No Transportation Needs   • Lack of Transportation (Medical): No   • Lack of Transportation (Non-Medical): No   Physical Activity: Not on file   Stress: Not on file   Social Connections: Not on file   Intimate Partner Violence: Not on file   Housing Stability: Not on file      Medications and Allergies:     Current Outpatient Medications   Medication Sig Dispense Refill   • amLODIPine-benazepril (LOTREL) 10-40 MG per capsule Take 1 capsule by mouth daily 90 capsule 1   • ammonium lactate (LAC-HYDRIN) 12 % lotion APPLY TO DRY SKIN TWICE DAILY  RUB IN THOROUGHLY   AVOID OPEN SKIN     • Calcium Carbonate-Vitamin D 600-200 MG-UNIT CAPS Take 1 capsule by mouth daily     • cholecalciferol (VITAMIN D3) 1,000 units tablet Take 1,000 Units by mouth daily     • cyanocobalamin (VITAMIN B-12) 500 mcg tablet Take 500 mcg by mouth daily     • diclofenac sodium (VOLTAREN) 1 % APPLY 2 GRAMS TOPICALLY 4  (FOUR) TIMES A  g 2   • diclofenac sodium (VOLTAREN) 50 mg EC tablet Take 1 tablet (50 mg total) by mouth 2 (two) times a day Take with food- pt taking daily 180 tablet 1   • rosuvastatin (CRESTOR) 10 MG tablet Take 1 tablet (10 mg total) by mouth daily 90 tablet 1   • zolpidem (AMBIEN) 10 mg tablet TAKE 1 TABLET BY MOUTH  EVERY NIGHT AT BEDTIME AS  NEEDED FOR INSOMNIA 90 tablet 0     No current facility-administered medications for this visit  No Known Allergies   Immunizations:     Immunization History   Administered Date(s) Administered   • COVID-19 MODERNA VACC 0 25 ML IM BOOSTER 12/08/2021   • COVID-19 MODERNA VACC 0 5 ML IM 04/01/2021, 04/29/2021   • INFLUENZA 11/09/2015, 11/16/2016, 10/10/2017   • Influenza Split High Dose Preservative Free IM 10/21/2013, 01/08/2015, 11/09/2015, 11/16/2016, 10/10/2017   • Influenza, high dose seasonal 0 7 mL 09/25/2018, 10/24/2019, 09/21/2020, 10/13/2021, 10/19/2022   • Influenza, seasonal, injectable 09/26/2012   • Pneumococcal Conjugate 13-Valent 01/08/2015   • Pneumococcal Polysaccharide PPV23 11/14/2013   • Tdap 11/09/2015      Health Maintenance:         Topic Date Due   • DXA SCAN  11/29/2023   • Breast Cancer Screening: Mammogram  07/01/2024   • Colorectal Cancer Screening  03/11/2029   • Hepatitis C Screening  Completed         Topic Date Due   • COVID-19 Vaccine (4 - Booster for [place]ise Reap series) 02/02/2022      Medicare Screening Tests and Risk Assessments:     Rasta Lowe is here for her Subsequent Wellness visit  Last Medicare Wellness visit information reviewed, patient interviewed and updates made to the record today  Health Risk Assessment:   Patient rates overall health as very good  Patient feels that their physical health rating is same  Patient is satisfied with their life  Eyesight was rated as slightly worse   Hearing was rated as same  Patient feels that their emotional and mental health rating is same  Patients states they are never, rarely angry  Patient states they are never, rarely unusually tired/fatigued  Pain experienced in the last 7 days has been some  Patient's pain rating has been 2/10  Patient states that she has experienced no weight loss or gain in last 6 months  Fall Risk Screening: In the past year, patient has experienced: no history of falling in past year      Urinary Incontinence Screening:   Patient has not leaked urine accidently in the last six months  Home Safety:  Patient does not have trouble with stairs inside or outside of their home  Patient has working smoke alarms and has working carbon monoxide detector  Home safety hazards include: none  Nutrition:   Current diet is Regular  Medications:   Patient is not currently taking any over-the-counter supplements  Patient is able to manage medications  Activities of Daily Living (ADLs)/Instrumental Activities of Daily Living (IADLs):   Walk and transfer into and out of bed and chair?: Yes  Dress and groom yourself?: Yes    Bathe or shower yourself?: Yes    Feed yourself? Yes  Do your laundry/housekeeping?: Yes  Manage your money, pay your bills and track your expenses?: Yes  Make your own meals?: Yes    Do your own shopping?: Yes    Previous Hospitalizations:   Any hospitalizations or ED visits within the last 12 months?: No      Advance Care Planning:   Living will: Yes    Durable POA for healthcare:  Yes    Advanced directive: Yes    Advanced directive counseling given: Yes    Five wishes given: Yes    Patient declined ACP directive: No    End of Life Decisions reviewed with patient: Yes    Provider agrees with end of life decisions: Yes      Cognitive Screening:   Provider or family/friend/caregiver concerned regarding cognition?: No    PREVENTIVE SCREENINGS      Cardiovascular Screening:    General: Screening Not Indicated and History Lipid Disorder      Diabetes Screening:     General: Screening Current      Colorectal Cancer Screening:     General: Screening Current      Breast Cancer Screening:     General: Screening Current      Cervical Cancer Screening:    General: Screening Not Indicated      Osteoporosis Screening:    General: Screening Current      Abdominal Aortic Aneurysm (AAA) Screening:        General: Screening Not Indicated      Lung Cancer Screening:     General: Screening Not Indicated      Hepatitis C Screening:    General: Screening Current    Screening, Brief Intervention, and Referral to Treatment (SBIRT)    Screening      Single Item Drug Screening:  How often have you used an illegal drug (including marijuana) or a prescription medication for non-medical reasons in the past year? never    Single Item Drug Screen Score: 0  Interpretation: Negative screen for possible drug use disorder    Other Counseling Topics:   Car/seat belt/driving safety, skin self-exam, sunscreen and calcium and vitamin D intake and regular weightbearing exercise  No results found  Physical Exam:     /78 (BP Location: Left arm, Patient Position: Sitting, Cuff Size: Adult)   Pulse 74   Temp (!) 97 1 °F (36 2 °C) (Tympanic)   Ht 5' 5" (1 651 m)   Wt 76 2 kg (168 lb)   SpO2 94%   BMI 27 96 kg/m²     Physical Exam  Vitals and nursing note reviewed  Constitutional:       Appearance: Normal appearance  She is well-developed  HENT:      Head: Normocephalic and atraumatic  Right Ear: Tympanic membrane, ear canal and external ear normal       Left Ear: Tympanic membrane, ear canal and external ear normal       Nose: Nose normal       Mouth/Throat:      Mouth: Mucous membranes are moist    Eyes:      General: Lids are normal  Vision grossly intact  Conjunctiva/sclera: Conjunctivae normal       Pupils: Pupils are equal, round, and reactive to light  Cardiovascular:      Rate and Rhythm: Normal rate and regular rhythm        Pulses: Normal pulses  Heart sounds: Normal heart sounds, S1 normal and S2 normal      No friction rub  No gallop  No S3 sounds  Pulmonary:      Effort: Pulmonary effort is normal       Breath sounds: Normal breath sounds  Abdominal:      General: Bowel sounds are normal       Palpations: Abdomen is soft  Tenderness: There is no abdominal tenderness  Genitourinary:     Comments: Deferred  Musculoskeletal:         General: Normal range of motion  Cervical back: Normal range of motion and neck supple  Right lower leg: No edema  Left lower leg: No edema  Lymphadenopathy:      Cervical: No cervical adenopathy  Skin:     General: Skin is warm and dry  Capillary Refill: Capillary refill takes less than 2 seconds  Neurological:      General: No focal deficit present  Mental Status: She is alert and oriented to person, place, and time  Motor: Motor function is intact  Gait: Gait is intact  Psychiatric:         Attention and Perception: Attention and perception normal          Mood and Affect: Mood and affect normal          Speech: Speech normal          Behavior: Behavior normal  Behavior is cooperative  Thought Content:  Thought content normal          Cognition and Memory: Cognition and memory normal          Judgment: Judgment normal           ANDRADE Harrison Cap

## 2023-03-08 NOTE — PATIENT INSTRUCTIONS
Medicare Preventive Visit Patient Instructions  Thank you for completing your Welcome to Medicare Visit or Medicare Annual Wellness Visit today  Your next wellness visit will be due in one year (3/8/2024)  The screening/preventive services that you may require over the next 5-10 years are detailed below  Some tests may not apply to you based off risk factors and/or age  Screening tests ordered at today's visit but not completed yet may show as past due  Also, please note that scanned in results may not display below  Preventive Screenings:  Service Recommendations Previous Testing/Comments   Colorectal Cancer Screening  * Colonoscopy    * Fecal Occult Blood Test (FOBT)/Fecal Immunochemical Test (FIT)  * Fecal DNA/Cologuard Test  * Flexible Sigmoidoscopy Age: 39-70 years old   Colonoscopy: every 10 years (may be performed more frequently if at higher risk)  OR  FOBT/FIT: every 1 year  OR  Cologuard: every 3 years  OR  Sigmoidoscopy: every 5 years  Screening may be recommended earlier than age 39 if at higher risk for colorectal cancer  Also, an individualized decision between you and your healthcare provider will decide whether screening between the ages of 74-80 would be appropriate  Colonoscopy: 03/11/2019  FOBT/FIT: Not on file  Cologuard: Not on file  Sigmoidoscopy: Not on file    Screening Current     Breast Cancer Screening Age: 36 years old  Frequency: every 1-2 years  Not required if history of left and right mastectomy Mammogram: 07/01/2022    Screening Current   Cervical Cancer Screening Between the ages of 21-29, pap smear recommended once every 3 years  Between the ages of 33-67, can perform pap smear with HPV co-testing every 5 years     Recommendations may differ for women with a history of total hysterectomy, cervical cancer, or abnormal pap smears in past  Pap Smear: 04/17/2019    Screening Not Indicated   Hepatitis C Screening Once for adults born between 1945 and 1965  More frequently in patients at high risk for Hepatitis C Hep C Antibody: 01/03/2017    Screening Current   Diabetes Screening 1-2 times per year if you're at risk for diabetes or have pre-diabetes Fasting glucose: 107 mg/dL (8/22/2022)  A1C: No results in last 5 years (No results in last 5 years)  Screening Current   Cholesterol Screening Once every 5 years if you don't have a lipid disorder  May order more often based on risk factors  Lipid panel: 08/22/2022    Screening Not Indicated  History Lipid Disorder     Other Preventive Screenings Covered by Medicare:  1  Abdominal Aortic Aneurysm (AAA) Screening: covered once if your at risk  You're considered to be at risk if you have a family history of AAA  2  Lung Cancer Screening: covers low dose CT scan once per year if you meet all of the following conditions: (1) Age 50-69; (2) No signs or symptoms of lung cancer; (3) Current smoker or have quit smoking within the last 15 years; (4) You have a tobacco smoking history of at least 20 pack years (packs per day multiplied by number of years you smoked); (5) You get a written order from a healthcare provider  3  Glaucoma Screening: covered annually if you're considered high risk: (1) You have diabetes OR (2) Family history of glaucoma OR (3)  aged 48 and older OR (3)  American aged 72 and older  3  Osteoporosis Screening: covered every 2 years if you meet one of the following conditions: (1) You're estrogen deficient and at risk for osteoporosis based off medical history and other findings; (2) Have a vertebral abnormality; (3) On glucocorticoid therapy for more than 3 months; (4) Have primary hyperparathyroidism; (5) On osteoporosis medications and need to assess response to drug therapy  · Last bone density test (DXA Scan): 11/29/2021   5  HIV Screening: covered annually if you're between the age of 15-65  Also covered annually if you are younger than 13 and older than 72 with risk factors for HIV infection  For pregnant patients, it is covered up to 3 times per pregnancy  Immunizations:  Immunization Recommendations   Influenza Vaccine Annual influenza vaccination during flu season is recommended for all persons aged >= 6 months who do not have contraindications   Pneumococcal Vaccine   * Pneumococcal conjugate vaccine = PCV13 (Prevnar 13), PCV15 (Vaxneuvance), PCV20 (Prevnar 20)  * Pneumococcal polysaccharide vaccine = PPSV23 (Pneumovax) Adults 25-60 years old: 1-3 doses may be recommended based on certain risk factors  Adults 72 years old: 1-2 doses may be recommended based off what pneumonia vaccine you previously received   Hepatitis B Vaccine 3 dose series if at intermediate or high risk (ex: diabetes, end stage renal disease, liver disease)   Tetanus (Td) Vaccine - COST NOT COVERED BY MEDICARE PART B Following completion of primary series, a booster dose should be given every 10 years to maintain immunity against tetanus  Td may also be given as tetanus wound prophylaxis  Tdap Vaccine - COST NOT COVERED BY MEDICARE PART B Recommended at least once for all adults  For pregnant patients, recommended with each pregnancy  Shingles Vaccine (Shingrix) - COST NOT COVERED BY MEDICARE PART B  2 shot series recommended in those aged 48 and above     Health Maintenance Due:      Topic Date Due   • DXA SCAN  11/29/2023   • Breast Cancer Screening: Mammogram  07/01/2024   • Colorectal Cancer Screening  03/11/2029   • Hepatitis C Screening  Completed     Immunizations Due:      Topic Date Due   • COVID-19 Vaccine (4 - Booster for [place] series) 02/02/2022     Advance Directives   What are advance directives? Advance directives are legal documents that state your wishes and plans for medical care  These plans are made ahead of time in case you lose your ability to make decisions for yourself  Advance directives can apply to any medical decision, such as the treatments you want, and if you want to donate organs     What are the types of advance directives? There are many types of advance directives, and each state has rules about how to use them  You may choose a combination of any of the following:  · Living will: This is a written record of the treatment you want  You can also choose which treatments you do not want, which to limit, and which to stop at a certain time  This includes surgery, medicine, IV fluid, and tube feedings  · Durable power of  for healthcare [facility]): This is a written record that states who you want to make healthcare choices for you when you are unable to make them for yourself  This person, called a proxy, is usually a family member or a friend  You may choose more than 1 proxy  · Do not resuscitate (DNR) order:  A DNR order is used in case your heart stops beating or you stop breathing  It is a request not to have certain forms of treatment, such as CPR  A DNR order may be included in other types of advance directives  · Medical directive: This covers the care that you want if you are in a coma, near death, or unable to make decisions for yourself  You can list the treatments you want for each condition  Treatment may include pain medicine, surgery, blood transfusions, dialysis, IV or tube feedings, and a ventilator (breathing machine)  · Values history: This document has questions about your views, beliefs, and how you feel and think about life  This information can help others choose the care that you would choose  Why are advance directives important? An advance directive helps you control your care  Although spoken wishes may be used, it is better to have your wishes written down  Spoken wishes can be misunderstood, or not followed  Treatments may be given even if you do not want them  An advance directive may make it easier for your family to make difficult choices about your care     Weight Management   Why it is important to manage your weight:  Being overweight increases your risk of health conditions such as heart disease, high blood pressure, type 2 diabetes, and certain types of cancer  It can also increase your risk for osteoarthritis, sleep apnea, and other respiratory problems  Aim for a slow, steady weight loss  Even a small amount of weight loss can lower your risk of health problems  How to lose weight safely:  A safe and healthy way to lose weight is to eat fewer calories and get regular exercise  You can lose up about 1 pound a week by decreasing the number of calories you eat by 500 calories each day  Healthy meal plan for weight management:  A healthy meal plan includes a variety of foods, contains fewer calories, and helps you stay healthy  A healthy meal plan includes the following:  · Eat whole-grain foods more often  A healthy meal plan should contain fiber  Fiber is the part of grains, fruits, and vegetables that is not broken down by your body  Whole-grain foods are healthy and provide extra fiber in your diet  Some examples of whole-grain foods are whole-wheat breads and pastas, oatmeal, brown rice, and bulgur  · Eat a variety of vegetables every day  Include dark, leafy greens such as spinach, kale, mundo greens, and mustard greens  Eat yellow and orange vegetables such as carrots, sweet potatoes, and winter squash  · Eat a variety of fruits every day  Choose fresh or canned fruit (canned in its own juice or light syrup) instead of juice  Fruit juice has very little or no fiber  · Eat low-fat dairy foods  Drink fat-free (skim) milk or 1% milk  Eat fat-free yogurt and low-fat cottage cheese  Try low-fat cheeses such as mozzarella and other reduced-fat cheeses  · Choose meat and other protein foods that are low in fat  Choose beans or other legumes such as split peas or lentils  Choose fish, skinless poultry (chicken or turkey), or lean cuts of red meat (beef or pork)  Before you cook meat or poultry, cut off any visible fat  · Use less fat and oil  Try baking foods instead of frying them  Add less fat, such as margarine, sour cream, regular salad dressing and mayonnaise to foods  Eat fewer high-fat foods  Some examples of high-fat foods include french fries, doughnuts, ice cream, and cakes  · Eat fewer sweets  Limit foods and drinks that are high in sugar  This includes candy, cookies, regular soda, and sweetened drinks  Exercise:  Exercise at least 30 minutes per day on most days of the week  Some examples of exercise include walking, biking, dancing, and swimming  You can also fit in more physical activity by taking the stairs instead of the elevator or parking farther away from stores  Ask your healthcare provider about the best exercise plan for you  © Copyright [employer] 2018 Information is for End User's use only and may not be sold, redistributed or otherwise used for commercial purposes   All illustrations and images included in CareNotes® are the copyrighted property of [employer] M [employer]  or [address] [place], [place] [address]pe

## 2023-04-02 ENCOUNTER — OFFICE VISIT (OUTPATIENT)
Dept: URGENT CARE | Facility: CLINIC | Age: 73
End: 2023-04-02

## 2023-04-02 VITALS
HEART RATE: 96 BPM | OXYGEN SATURATION: 97 % | TEMPERATURE: 97.8 F | SYSTOLIC BLOOD PRESSURE: 132 MMHG | RESPIRATION RATE: 18 BRPM | DIASTOLIC BLOOD PRESSURE: 78 MMHG

## 2023-04-02 DIAGNOSIS — J20.9 ACUTE BRONCHITIS, UNSPECIFIED ORGANISM: ICD-10-CM

## 2023-04-02 DIAGNOSIS — R05.1 ACUTE COUGH: Primary | ICD-10-CM

## 2023-04-02 LAB
SARS-COV-2 AG UPPER RESP QL IA: NEGATIVE
VALID CONTROL: NORMAL

## 2023-04-02 RX ORDER — BENZONATATE 200 MG/1
200 CAPSULE ORAL 3 TIMES DAILY PRN
Qty: 20 CAPSULE | Refills: 0 | Status: SHIPPED | OUTPATIENT
Start: 2023-04-02

## 2023-04-02 RX ORDER — AZITHROMYCIN 250 MG/1
TABLET, FILM COATED ORAL
Qty: 6 TABLET | Refills: 0 | Status: SHIPPED | OUTPATIENT
Start: 2023-04-02 | End: 2023-04-06

## 2023-04-02 NOTE — PROGRESS NOTES
[address]        NAME: Petty Ruff is a 74 y o  female  : 1950    MRN: [id number]  DATE: 2023  TIME: 11:24 AM    Assessment and Plan   Acute cough [R05 1]  1  Acute cough  Poct Covid 19 Rapid Antigen Test    benzonatate (TESSALON) 200 MG capsule      2  Acute bronchitis, unspecified organism  azithromycin (ZITHROMAX) 250 mg tablet        Rapid COVID negative  Patient Instructions     Hydration and rest   Start azithromycin  Mucinex over-the-counter for cough and congestion  Benzonatate as needed for cough  Tylenol and Motrin over-the-counter for pain and fever  Follow up with PCP  Go to the nearest emergency department if you have difficulty breathing, worsening symptoms  Chief Complaint     Chief Complaint   Patient presents with   • Cough     Congestion, cough, fever started Wednesday  History of Present Illness       The patient presents today with complaints of cough, congestion, and fever that started on Wed  She was recently on a cruise last week and came home on Wed  She has been taking OTC medications with no relief  She states her cough is wet and productive for green colored sputum  Review of Systems   Review of Systems   Constitutional: Positive for fever  Negative for chills and fatigue  HENT: Positive for congestion and postnasal drip  Negative for ear pain, sinus pressure, sinus pain and sore throat  Eyes: Negative  Respiratory: Positive for cough (productive green)  Negative for shortness of breath  Cardiovascular: Negative for chest pain and palpitations  Gastrointestinal: Negative for abdominal pain, diarrhea, nausea and vomiting  Genitourinary: Negative for difficulty urinating  Musculoskeletal: Negative for myalgias  Skin: Negative for rash  Allergic/Immunologic: Negative for environmental allergies  Neurological: Negative for dizziness and headaches  Psychiatric/Behavioral: Negative            Current Medications       Current Outpatient Medications:   •  amLODIPine-benazepril (LOTREL) 10-40 MG per capsule, Take 1 capsule by mouth daily, Disp: 90 capsule, Rfl: 1  •  ammonium lactate (LAC-HYDRIN) 12 % lotion, APPLY TO DRY SKIN TWICE DAILY  RUB IN THOROUGHLY   AVOID OPEN SKIN, Disp: , Rfl:   •  azithromycin (ZITHROMAX) 250 mg tablet, Take 2 tablets today then 1 tablet daily x 4 days, Disp: 6 tablet, Rfl: 0  •  benzonatate (TESSALON) 200 MG capsule, Take 1 capsule (200 mg total) by mouth 3 (three) times a day as needed for cough, Disp: 20 capsule, Rfl: 0  •  Calcium Carbonate-Vitamin D 600-200 MG-UNIT CAPS, Take 1 capsule by mouth daily, Disp: , Rfl:   •  cholecalciferol (VITAMIN D3) 1,000 units tablet, Take 1,000 Units by mouth daily, Disp: , Rfl:   •  cyanocobalamin (VITAMIN B-12) 500 mcg tablet, Take 500 mcg by mouth daily, Disp: , Rfl:   •  diclofenac sodium (VOLTAREN) 1 %, APPLY 2 GRAMS TOPICALLY 4  (FOUR) TIMES A DAY, Disp: 600 g, Rfl: 2  •  diclofenac sodium (VOLTAREN) 50 mg EC tablet, Take 1 tablet (50 mg total) by mouth 2 (two) times a day Take with food- pt taking daily, Disp: 180 tablet, Rfl: 1  •  rosuvastatin (CRESTOR) 10 MG tablet, Take 1 tablet (10 mg total) by mouth daily, Disp: 90 tablet, Rfl: 1  •  zolpidem (AMBIEN) 10 mg tablet, TAKE 1 TABLET BY MOUTH  EVERY NIGHT AT BEDTIME AS  NEEDED FOR INSOMNIA, Disp: 90 tablet, Rfl: 0    Current Allergies     Allergies as of 04/02/2023   • (No Known Allergies)            The following portions of the patient's history were reviewed and updated as appropriate: allergies, current medications, past family history, past medical history, past social history, past surgical history and problem list      Past Medical History:   Diagnosis Date   • Arthritis    • Cataract     starting   • Hallux valgus of right foot    • Hyperlipidemia    • Hypertension    • RLL pneumonia     Last Assessed:11/4/14   • Skin rash 11/30/2016   • Wears glasses        Past Surgical History: Procedure Laterality Date   • BLADDER SUSPENSION     • COLONOSCOPY     • EYE SURGERY Right     Retinal Detachment complex   • [facility] CORRJ HALLUX VALGUS W/SESMDC W/DIST METAR OSTEOT Right 9/29/2020    Procedure: Paige [facility];  Surgeon: Cipriano Jolley DPM;  Location: [facility];  Service: Podiatry   • WISDOM TOOTH EXTRACTION         Family History   Problem Relation Age of Onset   • Heart disease Mother         Coronary disease   • Colon cancer Mother 67   • Diabetes Mother    • Heart disease Father         Coronary disease   • Diabetes Father    • No Known Problems Sister    • No Known Problems Daughter    • No Known Problems Maternal Grandmother    • No Known Problems Maternal Grandfather    • No Known Problems Paternal Grandmother    • No Known Problems Paternal Grandfather    • No Known Problems Son    • Colon cancer Family    • Coronary artery disease Family    • Diabetes type II Family    • No Known Problems Paternal Aunt          Medications have been verified  Objective   /78   Pulse 96   Temp 97 8 °F (36 6 °C)   Resp 18   SpO2 97%        Physical Exam     Physical Exam  Vitals and nursing note reviewed  Constitutional:       General: She is not in acute distress  Appearance: Normal appearance  She is not ill-appearing  HENT:      Head: Normocephalic and atraumatic  Right Ear: Tympanic membrane, ear canal and external ear normal       Left Ear: Tympanic membrane, ear canal and external ear normal       Nose: Congestion present  Mouth/Throat:      Lips: Pink  Mouth: Mucous membranes are moist       Pharynx: Oropharynx is clear  Posterior oropharyngeal erythema present  No oropharyngeal exudate  Tonsils: No tonsillar exudate  Eyes:      General: Vision grossly intact  Extraocular Movements: Extraocular movements intact  Pupils: Pupils are equal, round, and reactive to light  Cardiovascular:      Rate and Rhythm: Normal rate and regular rhythm  Heart sounds: Normal heart sounds  No murmur heard  Pulmonary:      Effort: Pulmonary effort is normal  No respiratory distress  Breath sounds: Normal breath sounds  No decreased air movement  No decreased breath sounds, wheezing, rhonchi or rales  Abdominal:      General: Abdomen is flat  Bowel sounds are normal       Palpations: Abdomen is soft  Musculoskeletal:         General: Normal range of motion  Cervical back: Normal range of motion  Skin:     General: Skin is warm  Findings: No rash  Neurological:      Mental Status: She is alert and oriented to person, place, and time  Motor: Motor function is intact     Psychiatric:         Attention and Perception: Attention normal          Mood and Affect: Mood normal

## 2023-04-02 NOTE — PATIENT INSTRUCTIONS
Hydration and rest   Start azithromycin  Mucinex over-the-counter for cough and congestion  Benzonatate as needed for cough  Tylenol and Motrin over-the-counter for pain and fever  Follow up with PCP  Go to the nearest emergency department if you have difficulty breathing, worsening symptoms  Acute Bronchitis   WHAT YOU NEED TO KNOW:   Acute bronchitis is swelling and irritation in your lungs  It is usually caused by a virus and most often happens in the winter  Bronchitis may also be caused by bacteria or by a chemical irritant, such as smoke  DISCHARGE INSTRUCTIONS:   Return to the emergency department if:   You cough up blood  Your lips or fingernails turn blue  You feel like you are not getting enough air when you breathe  Call your doctor if:   Your symptoms do not go away or get worse, even after treatment  Your cough does not get better within 4 weeks  You have questions or concerns about your condition or care  Medicines: You may  need any of the following:  Cough suppressants  decrease your urge to cough  Decongestants  help loosen mucus in your lungs and make it easier to cough up  This can help you breathe easier  Inhalers  may be given  Your healthcare provider may give you one or more inhalers to help you breathe easier and cough less  An inhaler gives your medicine to open your airways  Ask your healthcare provider to show you how to use your inhaler correctly  Antibiotics  may be given for up to 5 days if your bronchitis is caused by bacteria  Acetaminophen  decreases pain and fever  It is available without a doctor's order  Ask how much to take and how often to take it  Follow directions  Read the labels of all other medicines you are using to see if they also contain acetaminophen, or ask your doctor or pharmacist  Acetaminophen can cause liver damage if not taken correctly   Do not use more than 4 grams (4,000 milligrams) total of acetaminophen in one day  NSAIDs  help decrease swelling and pain or fever  This medicine is available with or without a doctor's order  NSAIDs can cause stomach bleeding or kidney problems in certain people  If you take blood thinner medicine, always ask your healthcare provider if NSAIDs are safe for you  Always read the medicine label and follow directions  Take your medicine as directed  Contact your healthcare provider if you think your medicine is not helping or if you have side effects  Tell him of her if you are allergic to any medicine  Keep a list of the medicines, vitamins, and herbs you take  Include the amounts, and when and why you take them  Bring the list or the pill bottles to follow-up visits  Carry your medicine list with you in case of an emergency  Self-care:   Drink liquids as directed  You may need to drink more liquids than usual to stay hydrated  Ask how much liquid to drink each day and which liquids are best for you  Use a cool mist humidifier  to increase air moisture in your home  This may make it easier for you to breathe and help decrease your cough  Get more rest   Rest helps your body to heal  Slowly start to do more each day  Rest when you feel it is needed  Avoid irritants in the air  Avoid chemicals, fumes, and dust  Wear a face mask if you must work around dust or fumes  Stay inside on days when air pollution levels are high  If you have allergies, stay inside when pollen counts are high  Do not use aerosol products, such as spray-on deodorant, bug spray, and hair spray  Do not smoke or be around others who are smoking  Nicotine and other chemicals in cigarettes and cigars can cause lung damage  Ask your healthcare provider for information if you currently smoke and need help to quit  E-cigarettes or smokeless tobacco still contain nicotine  Talk to your healthcare provider before you use these products       Prevent acute bronchitis:       Ask about vaccines you may need  Get a flu vaccine each year as soon as recommended, usually in September or October  Ask your healthcare provider if you should also get a pneumonia or COVID-19 vaccine  Your healthcare provider can tell you if you should also get other vaccines, and when to get them  Prevent the spread of germs  You can decrease your risk for acute bronchitis and other illnesses by doing the following:      Wash your hands often with soap and water  Carry germ-killing hand lotion or gel with you  You can use the lotion or gel to clean your hands when soap and water are not available  Do not touch your eyes, nose, or mouth unless you have washed your hands first      Always cover your mouth when you cough to prevent the spread of germs  It is best to cough into a tissue or your shirt sleeve instead of into your hand  Ask those around you to cover their mouths when they cough  Try to avoid people who have a cold or the flu  If you are sick, stay away from others as much as possible  Follow up with your doctor as directed:  Write down questions you have so you will remember to ask them during your follow-up visits  © Copyright [employer] 2021 Information is for End User's use only and may not be sold, redistributed or otherwise used for commercial purposes  All illustrations and images included in CareNotes® are the copyrighted property of [employer]  or Ashia Thapa  The above information is an  only  It is not intended as medical advice for individual conditions or treatments  Talk to your doctor, nurse or pharmacist before following any medical regimen to see if it is safe and effective for you

## 2023-04-28 ENCOUNTER — TELEPHONE (OUTPATIENT)
Dept: FAMILY MEDICINE CLINIC | Facility: CLINIC | Age: 73
End: 2023-04-28

## 2023-04-28 DIAGNOSIS — I10 ESSENTIAL HYPERTENSION: ICD-10-CM

## 2023-04-28 RX ORDER — AMLODIPINE AND BENAZEPRIL HYDROCHLORIDE 10; 40 MG/1; MG/1
1 CAPSULE ORAL DAILY
Qty: 90 CAPSULE | Refills: 1 | Status: SHIPPED | OUTPATIENT
Start: 2023-04-28 | End: 2023-07-20 | Stop reason: SDUPTHER

## 2023-04-28 NOTE — TELEPHONE ENCOUNTER
Pt called in  [facility] is out of stock with her Lotrel  Is there an alternative that could be called in? Pt is also willing to get this at [facility] if needed

## 2023-07-06 ENCOUNTER — HOSPITAL ENCOUNTER (OUTPATIENT)
Dept: MAMMOGRAPHY | Facility: HOSPITAL | Age: 73
End: 2023-07-06
Payer: MEDICARE

## 2023-07-06 VITALS — HEIGHT: 65 IN | WEIGHT: 162 LBS | BODY MASS INDEX: 26.99 KG/M2

## 2023-07-06 DIAGNOSIS — Z13.9 SCREENING DUE: ICD-10-CM

## 2023-07-06 PROCEDURE — 77063 BREAST TOMOSYNTHESIS BI: CPT

## 2023-07-06 PROCEDURE — 77067 SCR MAMMO BI INCL CAD: CPT

## 2023-07-18 DIAGNOSIS — M79.672 FOOT PAIN, BILATERAL: ICD-10-CM

## 2023-07-18 DIAGNOSIS — M79.671 FOOT PAIN, BILATERAL: ICD-10-CM

## 2023-07-20 ENCOUNTER — CONSULT (OUTPATIENT)
Dept: FAMILY MEDICINE CLINIC | Facility: CLINIC | Age: 73
End: 2023-07-20
Payer: MEDICARE

## 2023-07-20 VITALS
TEMPERATURE: 97.8 F | WEIGHT: 166 LBS | BODY MASS INDEX: 27.66 KG/M2 | HEIGHT: 65 IN | SYSTOLIC BLOOD PRESSURE: 116 MMHG | HEART RATE: 83 BPM | OXYGEN SATURATION: 98 % | DIASTOLIC BLOOD PRESSURE: 84 MMHG

## 2023-07-20 DIAGNOSIS — Z01.818 PRE-OP EXAM: Primary | ICD-10-CM

## 2023-07-20 DIAGNOSIS — I10 ESSENTIAL HYPERTENSION: ICD-10-CM

## 2023-07-20 PROCEDURE — 93000 ELECTROCARDIOGRAM COMPLETE: CPT | Performed by: NURSE PRACTITIONER

## 2023-07-20 PROCEDURE — 99214 OFFICE O/P EST MOD 30 MIN: CPT | Performed by: NURSE PRACTITIONER

## 2023-07-20 RX ORDER — AMLODIPINE AND BENAZEPRIL HYDROCHLORIDE 10; 40 MG/1; MG/1
1 CAPSULE ORAL DAILY
Qty: 90 CAPSULE | Refills: 1 | Status: SHIPPED | OUTPATIENT
Start: 2023-07-20

## 2023-07-20 NOTE — PROGRESS NOTES
Subjective:     Kate Rodriguez is a 74 y.o. female who presents to the office today for a preoperative consultation at the request of surgeon Dr Issac Daley who plans on performing Cataract extraction OD on August 3 and cataract extraction OS August 15. This consultation is requested for the specific conditions prompting preoperative evaluation (i.e. because of potential affect on operative risk): infection, bleeding, death, altered vision. Planned anesthesia: local. The patient has the following known anesthesia issues: none. Patients bleeding risk: no recent abnormal bleeding, no remote history of abnormal bleeding and no use of Ca-channel blockers. The following portions of the patient's history were reviewed and updated as appropriate:   She has a past medical history of Arthritis, Cataract, Hallux valgus of right foot, Hyperlipidemia, Hypertension, RLL pneumonia, Skin rash (11/30/2016), and Wears glasses. ,  does not have any pertinent problems on file. ,   has a past surgical history that includes Bladder suspension; Eye surgery (Right); Colonoscopy; [place] tooth extraction; and pr corrj hallux valgus w/sesmdc w/dist metar osteot (Right, 9/29/2020). ,  family history includes Colon cancer in her family; Colon cancer (age of onset: 67) in her mother; Coronary artery disease in her family; Diabetes in her father and mother; Diabetes type II in her family; Heart disease in her father and mother; No Known Problems in her daughter, maternal grandfather, maternal grandmother, paternal aunt, paternal grandfather, paternal grandmother, sister, and son.,   reports that she has never smoked. She has never used smokeless tobacco. She reports current alcohol use. She reports that she does not use drugs. ,  has No Known Allergies. .  Current Outpatient Medications   Medication Sig Dispense Refill   • amLODIPine-benazepril (LOTREL) 10-40 MG per capsule Take 1 capsule by mouth daily 90 capsule 1   • ammonium lactate (LAC-HYDRIN) 12 % lotion APPLY TO DRY SKIN TWICE DAILY. RUB IN THOROUGHLY. AVOID OPEN SKIN     • Calcium Carbonate-Vitamin D 600-200 MG-UNIT CAPS Take 1 capsule by mouth daily     • cholecalciferol (VITAMIN D3) 1,000 units tablet Take 1,000 Units by mouth daily     • cyanocobalamin (VITAMIN B-12) 500 mcg tablet Take 500 mcg by mouth daily     • diclofenac sodium (VOLTAREN) 1 % APPLY 2 GRAMS TOPICALLY 4  (FOUR) TIMES A  g 2   • diclofenac sodium (VOLTAREN) 50 mg EC tablet TAKE 1 TABLET BY MOUTH  TWICE DAILY TAKE WITH FOOD 180 tablet 1   • rosuvastatin (CRESTOR) 10 MG tablet Take 1 tablet (10 mg total) by mouth daily 90 tablet 1   • zolpidem (AMBIEN) 10 mg tablet TAKE 1 TABLET BY MOUTH  EVERY NIGHT AT BEDTIME AS  NEEDED FOR INSOMNIA 90 tablet 0     No current facility-administered medications for this visit. Review of Systems  Review of Systems   Eyes: Positive for visual disturbance. All other systems reviewed and are negative. Vitals:    07/20/23 0929   BP: 116/84   Pulse: 83   Temp: 97.8 °F (36.6 °C)   SpO2: 98%     Body mass index is 27.62 kg/m². Objective:    Physical Exam  Vitals and nursing note reviewed. Constitutional:       Appearance: Normal appearance. She is well-developed. HENT:      Head: Normocephalic and atraumatic. Right Ear: Tympanic membrane, ear canal and external ear normal.      Left Ear: Tympanic membrane, ear canal and external ear normal.      Nose: Nose normal.      Mouth/Throat:      Mouth: Mucous membranes are moist.   Eyes:      General: Lids are normal. Vision grossly intact. Conjunctiva/sclera: Conjunctivae normal.      Pupils: Pupils are equal, round, and reactive to light. Cardiovascular:      Rate and Rhythm: Normal rate and regular rhythm. Pulses: Normal pulses. Heart sounds: Normal heart sounds, S1 normal and S2 normal.      No friction rub. No gallop. No S3 sounds.    Pulmonary:      Effort: Pulmonary effort is normal.      Breath sounds: Normal breath sounds. Abdominal:      General: Bowel sounds are normal.      Palpations: Abdomen is soft. Tenderness: There is no abdominal tenderness. Genitourinary:     Comments: Deferred  Musculoskeletal:         General: Normal range of motion. Cervical back: Normal range of motion and neck supple. Right lower leg: No edema. Left lower leg: No edema. Lymphadenopathy:      Cervical: No cervical adenopathy. Skin:     General: Skin is warm and dry. Capillary Refill: Capillary refill takes less than 2 seconds. Neurological:      General: No focal deficit present. Mental Status: She is alert and oriented to person, place, and time. Motor: Motor function is intact. Gait: Gait is intact. Psychiatric:         Attention and Perception: Attention and perception normal.         Mood and Affect: Mood and affect normal.         Speech: Speech normal.         Behavior: Behavior normal. Behavior is cooperative. Thought Content: Thought content normal.         Cognition and Memory: Cognition and memory normal.         Judgment: Judgment normal.           Predictors of intubation difficulty:   Morbid obesity? no   Anatomically abnormal facies? no   Prominent incisors? no   Receding mandible? no   Short, thick neck? no   Neck range of motion: normal   Mallampati score: I (soft palate, uvula, fauces, and tonsillar pillars visible)   Thyromental distance: < 6cm    Cardiographics  ECG: rhythm: normal sinus rhythm, rate=70 bpm, ht=952 ms, qrs=80 ms, pl=895 ms, axis=43/-16/8 degrees and unchanged compared to previous  Echocardiogram: not done    Imaging  Chest x-ray: not done     Lab Review   No visits with results within 2 Month(s) from this visit.    Latest known visit with results is:   Office Visit on 04/02/2023   Component Date Value   • POCT SARS-CoV-2 Ag 04/02/2023 Negative    • VALID CONTROL 04/02/2023 Valid         Assessment:    74 y.o. female  with planned surgery as above. Known risk factors for perioperative complications: None    Difficulty with intubation is not anticipated. Cardiac Risk Estimation: low    Current medications which may produce withdrawal symptoms if withheld perioperatively: none     Plan:    1. Preoperative workup as follows ECG. 2. Change in medication regimen before surgery: none, continue medication regimen including morning of surgery, with sip of water. 3. Prophylaxis for cardiac events with perioperative beta-blockers: not indicated. 4. Invasive hemodynamic monitoring perioperatively: not indicated. 5. Deep vein thrombosis prophylaxis postoperatively:intermittent pneumatic compression boots. 6. Surveillance for postoperative MI with ECG immediately postoperatively and on postoperative days 1 and 2 AND troponin levels 24 hours postoperatively and on day 4 or hospital discharge (whichever comes first): not indicated. 7. Talita Dumont surgical risk is low and is cleared for surgical procedure.

## 2023-09-07 ENCOUNTER — APPOINTMENT (OUTPATIENT)
Dept: LAB | Facility: CLINIC | Age: 73
End: 2023-09-07
Payer: MEDICARE

## 2023-09-07 DIAGNOSIS — Z13.1 SCREENING FOR DIABETES MELLITUS: ICD-10-CM

## 2023-09-07 DIAGNOSIS — Z13.0 SCREENING FOR DEFICIENCY ANEMIA: ICD-10-CM

## 2023-09-07 DIAGNOSIS — E78.01 FAMILIAL HYPERCHOLESTEROLEMIA: ICD-10-CM

## 2023-09-07 DIAGNOSIS — E03.8 SUBCLINICAL HYPOTHYROIDISM: ICD-10-CM

## 2023-09-07 LAB
ALBUMIN SERPL BCP-MCNC: 4 G/DL (ref 3.5–5)
ALP SERPL-CCNC: 51 U/L (ref 34–104)
ALT SERPL W P-5'-P-CCNC: 20 U/L (ref 7–52)
ANION GAP SERPL CALCULATED.3IONS-SCNC: 9 MMOL/L
AST SERPL W P-5'-P-CCNC: 27 U/L (ref 13–39)
BASOPHILS # BLD AUTO: 0.04 THOUSANDS/ÂΜL (ref 0–0.1)
BASOPHILS NFR BLD AUTO: 1 % (ref 0–1)
BILIRUB SERPL-MCNC: 0.46 MG/DL (ref 0.2–1)
BUN SERPL-MCNC: 24 MG/DL (ref 5–25)
CALCIUM SERPL-MCNC: 9.8 MG/DL (ref 8.4–10.2)
CHLORIDE SERPL-SCNC: 106 MMOL/L (ref 96–108)
CHOLEST SERPL-MCNC: 132 MG/DL
CO2 SERPL-SCNC: 28 MMOL/L (ref 21–32)
CREAT SERPL-MCNC: 1.12 MG/DL (ref 0.6–1.3)
EOSINOPHIL # BLD AUTO: 0.27 THOUSAND/ÂΜL (ref 0–0.61)
EOSINOPHIL NFR BLD AUTO: 4 % (ref 0–6)
ERYTHROCYTE [DISTWIDTH] IN BLOOD BY AUTOMATED COUNT: 14.1 % (ref 11.6–15.1)
GFR SERPL CREATININE-BSD FRML MDRD: 48 ML/MIN/1.73SQ M
GLUCOSE P FAST SERPL-MCNC: 88 MG/DL (ref 65–99)
HCT VFR BLD AUTO: 40.4 % (ref 34.8–46.1)
HDLC SERPL-MCNC: 55 MG/DL
HGB BLD-MCNC: 12.7 G/DL (ref 11.5–15.4)
IMM GRANULOCYTES # BLD AUTO: 0.01 THOUSAND/UL (ref 0–0.2)
IMM GRANULOCYTES NFR BLD AUTO: 0 % (ref 0–2)
LDLC SERPL CALC-MCNC: 59 MG/DL (ref 0–100)
LYMPHOCYTES # BLD AUTO: 3.38 THOUSANDS/ÂΜL (ref 0.6–4.47)
LYMPHOCYTES NFR BLD AUTO: 51 % (ref 14–44)
MCH RBC QN AUTO: 30.4 PG (ref 26.8–34.3)
MCHC RBC AUTO-ENTMCNC: 31.4 G/DL (ref 31.4–37.4)
MCV RBC AUTO: 97 FL (ref 82–98)
MONOCYTES # BLD AUTO: 0.74 THOUSAND/ÂΜL (ref 0.17–1.22)
MONOCYTES NFR BLD AUTO: 11 % (ref 4–12)
NEUTROPHILS # BLD AUTO: 2.17 THOUSANDS/ÂΜL (ref 1.85–7.62)
NEUTS SEG NFR BLD AUTO: 33 % (ref 43–75)
NRBC BLD AUTO-RTO: 0 /100 WBCS
PLATELET # BLD AUTO: 328 THOUSANDS/UL (ref 149–390)
PMV BLD AUTO: 10.2 FL (ref 8.9–12.7)
POTASSIUM SERPL-SCNC: 3.9 MMOL/L (ref 3.5–5.3)
PROT SERPL-MCNC: 6.9 G/DL (ref 6.4–8.4)
RBC # BLD AUTO: 4.18 MILLION/UL (ref 3.81–5.12)
SODIUM SERPL-SCNC: 143 MMOL/L (ref 135–147)
TRIGL SERPL-MCNC: 91 MG/DL
TSH SERPL DL<=0.05 MIU/L-ACNC: 4.1 UIU/ML (ref 0.45–4.5)
WBC # BLD AUTO: 6.61 THOUSAND/UL (ref 4.31–10.16)

## 2023-09-07 PROCEDURE — 80053 COMPREHEN METABOLIC PANEL: CPT

## 2023-09-07 PROCEDURE — 80061 LIPID PANEL: CPT

## 2023-09-07 PROCEDURE — 85025 COMPLETE CBC W/AUTO DIFF WBC: CPT

## 2023-09-07 PROCEDURE — 84443 ASSAY THYROID STIM HORMONE: CPT

## 2023-09-07 PROCEDURE — 36415 COLL VENOUS BLD VENIPUNCTURE: CPT

## 2023-09-20 DIAGNOSIS — E78.01 FAMILIAL HYPERCHOLESTEROLEMIA: ICD-10-CM

## 2023-09-20 RX ORDER — ROSUVASTATIN CALCIUM 10 MG/1
10 TABLET, COATED ORAL DAILY
Qty: 90 TABLET | Refills: 1 | Status: SHIPPED | OUTPATIENT
Start: 2023-09-20

## 2023-09-21 ENCOUNTER — RA CDI HCC (OUTPATIENT)
Dept: OTHER | Facility: HOSPITAL | Age: 73
End: 2023-09-21

## 2023-09-27 ENCOUNTER — OFFICE VISIT (OUTPATIENT)
Dept: FAMILY MEDICINE CLINIC | Facility: CLINIC | Age: 73
End: 2023-09-27
Payer: MEDICARE

## 2023-09-27 VITALS
WEIGHT: 166 LBS | DIASTOLIC BLOOD PRESSURE: 90 MMHG | HEART RATE: 94 BPM | OXYGEN SATURATION: 99 % | TEMPERATURE: 97.7 F | HEIGHT: 65 IN | SYSTOLIC BLOOD PRESSURE: 128 MMHG | BODY MASS INDEX: 27.66 KG/M2

## 2023-09-27 DIAGNOSIS — M15.9 PRIMARY OSTEOARTHRITIS INVOLVING MULTIPLE JOINTS: ICD-10-CM

## 2023-09-27 DIAGNOSIS — E03.8 SUBCLINICAL HYPOTHYROIDISM: ICD-10-CM

## 2023-09-27 DIAGNOSIS — N18.31 STAGE 3A CHRONIC KIDNEY DISEASE (HCC): ICD-10-CM

## 2023-09-27 DIAGNOSIS — M21.611 BUNION OF GREAT TOE OF RIGHT FOOT: ICD-10-CM

## 2023-09-27 DIAGNOSIS — F51.01 PRIMARY INSOMNIA: ICD-10-CM

## 2023-09-27 DIAGNOSIS — E78.01 FAMILIAL HYPERCHOLESTEROLEMIA: ICD-10-CM

## 2023-09-27 DIAGNOSIS — M70.61 GREATER TROCHANTERIC BURSITIS OF RIGHT HIP: ICD-10-CM

## 2023-09-27 DIAGNOSIS — Z13.31 DEPRESSION SCREENING NEGATIVE: ICD-10-CM

## 2023-09-27 DIAGNOSIS — Z23 ENCOUNTER FOR VACCINATION: ICD-10-CM

## 2023-09-27 DIAGNOSIS — I10 ESSENTIAL HYPERTENSION: Primary | ICD-10-CM

## 2023-09-27 PROBLEM — N18.30 STAGE 3 CHRONIC KIDNEY DISEASE, UNSPECIFIED WHETHER STAGE 3A OR 3B CKD (HCC): Status: ACTIVE | Noted: 2023-09-27

## 2023-09-27 PROCEDURE — 90662 IIV NO PRSV INCREASED AG IM: CPT

## 2023-09-27 PROCEDURE — G0008 ADMIN INFLUENZA VIRUS VAC: HCPCS

## 2023-09-27 PROCEDURE — 99214 OFFICE O/P EST MOD 30 MIN: CPT | Performed by: NURSE PRACTITIONER

## 2023-09-27 RX ORDER — BROMFENAC SODIUM 0.7 MG/ML
SOLUTION/ DROPS OPHTHALMIC
COMMUNITY
Start: 2023-08-25

## 2023-09-27 RX ORDER — ZOLPIDEM TARTRATE 10 MG/1
TABLET ORAL
Qty: 90 TABLET | Refills: 0 | Status: SHIPPED | OUTPATIENT
Start: 2023-09-27

## 2023-09-27 NOTE — PROGRESS NOTES
Assessment/Plan:    Problem List Items Addressed This Visit     Osteoarthrosis    Relevant Medications    Diclofenac Sodium (VOLTAREN) 1 %    Familial hypercholesterolemia    Essential hypertension - Primary    Primary insomnia    Relevant Medications    zolpidem (AMBIEN) 10 mg tablet    Subclinical hypothyroidism    Bunion of great toe of right foot    Stage 3 chronic kidney disease, unspecified whether stage 3a or 3b CKD ([address])   Other Visit Diagnoses     Encounter for vaccination        Relevant Orders    influenza vaccine, high-dose, PF 0.7 mL (FLUZONE HIGH-DOSE) (Completed)    Greater trochanteric bursitis of right hip        Relevant Medications    Diclofenac Sodium (VOLTAREN) 1 %    Depression screening negative               Diagnoses and all orders for this visit:    Essential hypertension    Encounter for vaccination  -     influenza vaccine, high-dose, PF 0.7 mL (FLUZONE HIGH-DOSE)    Stage 3a chronic kidney disease ([address])    Bunion of great toe of right foot    Primary osteoarthritis involving multiple joints  -     Diclofenac Sodium (VOLTAREN) 1 %; Apply 2 g topically 4 (four) times a day    Greater trochanteric bursitis of right hip  -     Diclofenac Sodium (VOLTAREN) 1 %; Apply 2 g topically 4 (four) times a day    Subclinical hypothyroidism    Primary insomnia  -     zolpidem (AMBIEN) 10 mg tablet; TAKE 1 TABLET BY MOUTH  EVERY NIGHT AT BEDTIME AS  NEEDED FOR INSOMNIA    Familial hypercholesterolemia    Depression screening negative    Other orders  -     Prolensa 0.07 % SOLN        No problem-specific Assessment & Plan notes found for this encounter. Subjective:      Patient ID: Airam Yi is a 74 y.o. female. Patient presents for routine 6-month follow-up visit with serology review. Serology is unremarkable. Patient does report that she has had some intermittent leg cramping night time. She can trial some over-the-counter nighttime leg cramping medication with Mg.  Insomnia is well managed with current regimen, she takes Ambien PRN @ HS of which she cuts in half. Otherwise patient has no other interval or acute issues. She is agreeable for flu vaccine today. Hypertension  This is a chronic problem. The problem is controlled. There are no associated agents to hypertension. Risk factors for coronary artery disease include dyslipidemia and post-menopausal state. Past treatments include calcium channel blockers and ACE inhibitors. The current treatment provides significant improvement. There are no compliance problems. Hyperlipidemia  This is a chronic problem. The problem is controlled. There are no known factors aggravating her hyperlipidemia. Current antihyperlipidemic treatment includes statins and diet change. The current treatment provides significant improvement of lipids. There are no compliance problems. Risk factors for coronary artery disease include dyslipidemia, hypertension and post-menopausal.       The following portions of the patient's history were reviewed and updated as appropriate:   She has a past medical history of Arthritis, Cataract, Hallux valgus of right foot, Hyperlipidemia, Hypertension, RLL pneumonia, Skin rash (11/30/2016), and Wears glasses. ,  does not have any pertinent problems on file. ,   has a past surgical history that includes Bladder suspension; Eye surgery (Right); Colonoscopy; [place] tooth extraction; and pr corrj hallux valgus w/sesmdc w/dist metar osteot (Right, 9/29/2020). ,  family history includes Colon cancer in her family; Colon cancer (age of onset: 67) in her mother; Coronary artery disease in her family; Diabetes in her father and mother; Diabetes type II in her family; Heart disease in her father and mother; No Known Problems in her daughter, maternal grandfather, maternal grandmother, paternal aunt, paternal grandfather, paternal grandmother, sister, and son.,   reports that she has never smoked.  She has never used smokeless tobacco. She reports current alcohol use. She reports that she does not use drugs. ,  has No Known Allergies. .  Current Outpatient Medications   Medication Sig Dispense Refill   • amLODIPine-benazepril (LOTREL) 10-40 MG per capsule Take 1 capsule by mouth daily 90 capsule 1   • ammonium lactate (LAC-HYDRIN) 12 % lotion APPLY TO DRY SKIN TWICE DAILY. RUB IN THOROUGHLY. AVOID OPEN SKIN     • Calcium Carbonate-Vitamin D 600-200 MG-UNIT CAPS Take 1 capsule by mouth daily     • cholecalciferol (VITAMIN D3) 1,000 units tablet Take 1,000 Units by mouth daily     • cyanocobalamin (VITAMIN B-12) 500 mcg tablet Take 500 mcg by mouth daily     • Diclofenac Sodium (VOLTAREN) 1 % Apply 2 g topically 4 (four) times a day 350 g 2   • diclofenac sodium (VOLTAREN) 50 mg EC tablet TAKE 1 TABLET BY MOUTH  TWICE DAILY TAKE WITH FOOD 180 tablet 1   • Prolensa 0.07 % SOLN      • rosuvastatin (CRESTOR) 10 MG tablet TAKE 1 TABLET BY MOUTH DAILY 90 tablet 1   • zolpidem (AMBIEN) 10 mg tablet TAKE 1 TABLET BY MOUTH  EVERY NIGHT AT BEDTIME AS  NEEDED FOR INSOMNIA 90 tablet 0     No current facility-administered medications for this visit. Depression Screening and Follow-up Plan: Patient was screened for depression during today's encounter. They screened negative with a PHQ-2 score of 0. Review of Systems   Musculoskeletal:        Intermittent leg cramping @ HS   All other systems reviewed and are negative. Objective:  Vitals:    09/27/23 0839   BP: 128/90   Pulse: 94   Temp: 97.7 °F (36.5 °C)   TempSrc: Tympanic   SpO2: 99%   Weight: 75.3 kg (166 lb)   Height: 5' 5" (1.651 m)     Body mass index is 27.62 kg/m². Physical Exam  Vitals and nursing note reviewed. Constitutional:       Appearance: Normal appearance. She is well-developed. HENT:      Head: Normocephalic and atraumatic.       Right Ear: Tympanic membrane, ear canal and external ear normal.      Left Ear: Tympanic membrane, ear canal and external ear normal. Nose: Nose normal.      Mouth/Throat:      Mouth: Mucous membranes are moist.      Pharynx: Uvula midline. Eyes:      General: Lids are normal.      Conjunctiva/sclera: Conjunctivae normal.      Pupils: Pupils are equal, round, and reactive to light. Neck:      Thyroid: No thyroid mass or thyromegaly. Vascular: No JVD. Trachea: Trachea and phonation normal.   Cardiovascular:      Rate and Rhythm: Normal rate and regular rhythm. Pulses: Normal pulses. Heart sounds: Normal heart sounds, S1 normal and S2 normal. No murmur heard. No friction rub. No gallop. Pulmonary:      Effort: Pulmonary effort is normal.      Breath sounds: Normal breath sounds. Abdominal:      General: Bowel sounds are normal.      Palpations: Abdomen is soft. Tenderness: There is no abdominal tenderness. Genitourinary:     Comments: Deferred   Musculoskeletal:         General: Normal range of motion. Cervical back: Full passive range of motion without pain, normal range of motion and neck supple. Right lower leg: No edema. Left lower leg: No edema. Lymphadenopathy:      Head:      Right side of head: No submental, submandibular, tonsillar, preauricular, posterior auricular or occipital adenopathy. Left side of head: No submental, submandibular, tonsillar, preauricular, posterior auricular or occipital adenopathy. Cervical: No cervical adenopathy. Skin:     General: Skin is warm and dry. Capillary Refill: Capillary refill takes less than 2 seconds. Neurological:      General: No focal deficit present. Mental Status: She is alert and oriented to person, place, and time. Cranial Nerves: No cranial nerve deficit. Sensory: Sensation is intact. Motor: Motor function is intact. Coordination: Coordination is intact. Gait: Gait is intact. Deep Tendon Reflexes: Reflexes are normal and symmetric.    Psychiatric:         Attention and Perception: Attention and perception normal.         Mood and Affect: Mood and affect normal.         Speech: Speech normal.         Behavior: Behavior normal. Behavior is cooperative. Thought Content: Thought content normal.         Cognition and Memory: Cognition normal.         Judgment: Judgment normal.           Appointment on 09/07/2023   Component Date Value Ref Range Status   • Cholesterol 09/07/2023 132  See Comment mg/dL Final    Cholesterol:         Pediatric <18 Years        Desirable          <170 mg/dL      Borderline High    170-199 mg/dL      High               >=200 mg/dL        Adult >=18 Years            Desirable         <200 mg/dL      Borderline High   200-239 mg/dL      High              >239 mg/dL     • Triglycerides 09/07/2023 91  See Comment mg/dL Final    Triglyceride:     0-9Y            <75mg/dL     10Y-17Y         <90 mg/dL       >=18Y     Normal          <150 mg/dL     Borderline High 150-199 mg/dL     High            200-499 mg/dL        Very High       >499 mg/dL    Specimen collection should occur prior to Metamizole administration due to the potential for falsely depressed results. • HDL, Direct 09/07/2023 55  >=50 mg/dL Final   • LDL Calculated 09/07/2023 59  0 - 100 mg/dL Final    LDL Cholesterol:     Optimal           <100 mg/dl     Near Optimal      100-129 mg/dl     Above Optimal       Borderline High 130-159 mg/dl       High            160-189 mg/dl       Very High       >189 mg/dl         This screening LDL is a calculated result. It does not have the accuracy of the Direct Measured LDL in the monitoring of patients with hyperlipidemia and/or statin therapy. Direct Measure LDL (JTX930) must be ordered separately in these patients.    • WBC 09/07/2023 6.61  4.31 - 10.16 Thousand/uL Final   • RBC 09/07/2023 4.18  3.81 - 5.12 Million/uL Final   • Hemoglobin 09/07/2023 12.7  11.5 - 15.4 g/dL Final   • Hematocrit 09/07/2023 40.4  34.8 - 46.1 % Final   • MCV 09/07/2023 97  82 - 98 fL Final   • MCH 09/07/2023 30.4  26.8 - 34.3 pg Final   • MCHC 09/07/2023 31.4  31.4 - 37.4 g/dL Final   • RDW 09/07/2023 14.1  11.6 - 15.1 % Final   • MPV 09/07/2023 10.2  8.9 - 12.7 fL Final   • Platelets 33/55/2311 328  149 - 390 Thousands/uL Final   • nRBC 09/07/2023 0  /100 WBCs Final   • Neutrophils Relative 09/07/2023 33 (L)  43 - 75 % Final   • Immat GRANS % 09/07/2023 0  0 - 2 % Final   • Lymphocytes Relative 09/07/2023 51 (H)  14 - 44 % Final   • Monocytes Relative 09/07/2023 11  4 - 12 % Final   • Eosinophils Relative 09/07/2023 4  0 - 6 % Final   • Basophils Relative 09/07/2023 1  0 - 1 % Final   • Neutrophils Absolute 09/07/2023 2.17  1.85 - 7.62 Thousands/µL Final   • Immature Grans Absolute 09/07/2023 0.01  0.00 - 0.20 Thousand/uL Final   • Lymphocytes Absolute 09/07/2023 3.38  0.60 - 4.47 Thousands/µL Final   • Monocytes Absolute 09/07/2023 0.74  0.17 - 1.22 Thousand/µL Final   • Eosinophils Absolute 09/07/2023 0.27  0.00 - 0.61 Thousand/µL Final   • Basophils Absolute 09/07/2023 0.04  0.00 - 0.10 Thousands/µL Final   • Sodium 09/07/2023 143  135 - 147 mmol/L Final   • Potassium 09/07/2023 3.9  3.5 - 5.3 mmol/L Final   • Chloride 09/07/2023 106  96 - 108 mmol/L Final   • CO2 09/07/2023 28  21 - 32 mmol/L Final   • ANION GAP 09/07/2023 9  mmol/L Final   • BUN 09/07/2023 24  5 - 25 mg/dL Final   • Creatinine 09/07/2023 1.12  0.60 - 1.30 mg/dL Final    Standardized to IDMS reference method   • Glucose, Fasting 09/07/2023 88  65 - 99 mg/dL Final   • Calcium 09/07/2023 9.8  8.4 - 10.2 mg/dL Final   • AST 09/07/2023 27  13 - 39 U/L Final   • ALT 09/07/2023 20  7 - 52 U/L Final    Specimen collection should occur prior to Sulfasalazine administration due to the potential for falsely depressed results.     • Alkaline Phosphatase 09/07/2023 51  34 - 104 U/L Final   • Total Protein 09/07/2023 6.9  6.4 - 8.4 g/dL Final   • Albumin 09/07/2023 4.0  3.5 - 5.0 g/dL Final   • Total Bilirubin 09/07/2023 0.46  0.20 - 1.00 mg/dL Final    Use of this assay is not recommended for patients undergoing treatment with eltrombopag due to the potential for falsely elevated results. N-acetyl-p-benzoquinone imine (metabolite of Acetaminophen) will generate erroneously low results in samples for patients that have taken an overdose of Acetaminophen. • eGFR 09/07/2023 48  ml/min/1.73sq m Final   • TSH 3RD GENERATON 09/07/2023 4.098  0.450 - 4.500 uIU/mL Final    The recommended reference ranges for TSH during pregnancy are as follows:   First trimester 0.1 to 2.5 uIU/mL   Second trimester  0.2 to 3.0 uIU/mL   Third trimester 0.3 to 3.0 uIU/m    Note: Normal ranges may not apply to patients who are transgender, non-binary, or whose legal sex, sex at birth, and gender identity differ. Adult TSH (3rd generation) reference range follows the recommended guidelines of the [employer], January, 2020. I have reviewed all the lab results. There are some abnormalities that are not critical to the patient's health, I have discussed these in person at this office appointment. Portions of the record may have been created with voice recognition software. Occasional wrong word or "sound a like" substitutions may have occurred due to the inherent limitations of voice recognition software. Read the chart carefully and recognize, using context, where substitutions have occurred. Contact me with any questions.

## 2023-12-10 DIAGNOSIS — I10 ESSENTIAL HYPERTENSION: ICD-10-CM

## 2023-12-11 RX ORDER — AMLODIPINE AND BENAZEPRIL HYDROCHLORIDE 10; 40 MG/1; MG/1
1 CAPSULE ORAL DAILY
Qty: 90 CAPSULE | Refills: 1 | Status: SHIPPED | OUTPATIENT
Start: 2023-12-11

## 2024-01-05 DIAGNOSIS — I10 ESSENTIAL HYPERTENSION: ICD-10-CM

## 2024-01-05 RX ORDER — AMLODIPINE AND BENAZEPRIL HYDROCHLORIDE 10; 40 MG/1; MG/1
1 CAPSULE ORAL DAILY
Qty: 90 CAPSULE | Refills: 1 | Status: SHIPPED | OUTPATIENT
Start: 2024-01-05

## 2024-02-21 ENCOUNTER — OFFICE VISIT (OUTPATIENT)
Dept: FAMILY MEDICINE CLINIC | Facility: CLINIC | Age: 74
End: 2024-02-21
Payer: MEDICARE

## 2024-02-21 VITALS
TEMPERATURE: 97.7 F | SYSTOLIC BLOOD PRESSURE: 132 MMHG | HEIGHT: 65 IN | DIASTOLIC BLOOD PRESSURE: 82 MMHG | BODY MASS INDEX: 27.76 KG/M2 | HEART RATE: 100 BPM | WEIGHT: 166.6 LBS | OXYGEN SATURATION: 97 %

## 2024-02-21 DIAGNOSIS — L30.4 INTERTRIGO: Primary | ICD-10-CM

## 2024-02-21 PROCEDURE — 99213 OFFICE O/P EST LOW 20 MIN: CPT | Performed by: NURSE PRACTITIONER

## 2024-02-21 RX ORDER — NYSTATIN 100000 U/G
CREAM TOPICAL 2 TIMES DAILY
Qty: 15 G | Refills: 0 | Status: SHIPPED | OUTPATIENT
Start: 2024-02-21

## 2024-02-21 NOTE — PROGRESS NOTES
Assessment/Plan:    Problem List Items Addressed This Visit    None  Visit Diagnoses     Intertrigo    -  Primary    Relevant Medications    nystatin (MYCOSTATIN) cream           Diagnoses and all orders for this visit:    Intertrigo  -     nystatin (MYCOSTATIN) cream; Apply topically 2 (two) times a day        No problem-specific Assessment & Plan notes found for this encounter.        Subjective:      Patient ID: Talita Dumont is a 73 y.o. female.    Patient presents with reports of redness underneath her breast.  Patient thought she might have shingles however she does not have the typical itchiness and pain.         The following portions of the patient's history were reviewed and updated as appropriate:   She has a past medical history of Arthritis, Cataract, Hallux valgus of right foot, Hyperlipidemia, Hypertension, RLL pneumonia, Skin rash (11/30/2016), and Wears glasses.,  does not have any pertinent problems on file.,   has a past surgical history that includes Bladder suspension; Eye surgery (Right); Colonoscopy; [place] tooth extraction; and pr corrj hlx vlgs bncty sesmdc dstl metar osteot (Right, 9/29/2020).,  family history includes Colon cancer in her family; Colon cancer (age of onset: 72) in her mother; Coronary artery disease in her family; Diabetes in her father and mother; Diabetes type II in her family; Heart disease in her father and mother; No Known Problems in her daughter, maternal grandfather, maternal grandmother, paternal aunt, paternal grandfather, paternal grandmother, sister, and son.,   reports that she has never smoked. She has never used smokeless tobacco. She reports current alcohol use. She reports that she does not use drugs.,  has no allergies on file..  Current Outpatient Medications   Medication Sig Dispense Refill   • amLODIPine-benazepril (LOTREL) 10-40 MG per capsule Take 1 capsule by mouth daily 90 capsule 1   • ammonium lactate (LAC-HYDRIN) 12 % lotion APPLY TO DRY SKIN  "TWICE DAILY. RUB IN THOROUGHLY. AVOID OPEN SKIN     • Calcium Carbonate-Vitamin D 600-200 MG-UNIT CAPS Take 1 capsule by mouth daily     • cholecalciferol (VITAMIN D3) 1,000 units tablet Take 1,000 Units by mouth daily     • cyanocobalamin (VITAMIN B-12) 500 mcg tablet Take 500 mcg by mouth daily     • Diclofenac Sodium (VOLTAREN) 1 % Apply 2 g topically 4 (four) times a day 350 g 2   • diclofenac sodium (VOLTAREN) 50 mg EC tablet TAKE 1 TABLET BY MOUTH  TWICE DAILY TAKE WITH FOOD 180 tablet 1   • nystatin (MYCOSTATIN) cream Apply topically 2 (two) times a day 15 g 0   • Prolensa 0.07 % SOLN      • rosuvastatin (CRESTOR) 10 MG tablet TAKE 1 TABLET BY MOUTH DAILY 90 tablet 1   • zolpidem (AMBIEN) 10 mg tablet TAKE 1 TABLET BY MOUTH  EVERY NIGHT AT BEDTIME AS  NEEDED FOR INSOMNIA 90 tablet 0     No current facility-administered medications for this visit.            Review of Systems   Skin:  Positive for color change.         Objective:  Vitals:    02/21/24 0842   BP: 132/82   Pulse: 100   Temp: 97.7 °F (36.5 °C)   TempSrc: Tympanic   SpO2: 97%   Weight: 75.6 kg (166 lb 9.6 oz)   Height: 5' 5\" (1.651 m)     Body mass index is 27.72 kg/m².     Physical Exam  Vitals and nursing note reviewed.   Constitutional:       Appearance: Normal appearance. She is well-developed.   HENT:      Head: Normocephalic and atraumatic.      Right Ear: Tympanic membrane, ear canal and external ear normal.      Left Ear: Tympanic membrane, ear canal and external ear normal.      Nose: Nose normal.      Mouth/Throat:      Mouth: Mucous membranes are moist.      Pharynx: Uvula midline.   Eyes:      General: Lids are normal.      Conjunctiva/sclera: Conjunctivae normal.      Pupils: Pupils are equal, round, and reactive to light.   Neck:      Thyroid: No thyroid mass or thyromegaly.      Vascular: No JVD.      Trachea: Trachea and phonation normal.   Cardiovascular:      Rate and Rhythm: Normal rate and regular rhythm.      Pulses: Normal " "pulses.      Heart sounds: Normal heart sounds, S1 normal and S2 normal. No murmur heard.     No friction rub. No gallop.   Pulmonary:      Effort: Pulmonary effort is normal.      Breath sounds: Normal breath sounds.   Abdominal:      General: Bowel sounds are normal.      Palpations: Abdomen is soft.      Tenderness: There is no abdominal tenderness.   Genitourinary:     Comments: Deferred   Musculoskeletal:         General: Normal range of motion.      Cervical back: Full passive range of motion without pain, normal range of motion and neck supple.      Right lower leg: No edema.      Left lower leg: No edema.   Lymphadenopathy:      Head:      Right side of head: No submental, submandibular, tonsillar, preauricular, posterior auricular or occipital adenopathy.      Left side of head: No submental, submandibular, tonsillar, preauricular, posterior auricular or occipital adenopathy.      Cervical: No cervical adenopathy.   Skin:     General: Skin is warm and dry.      Capillary Refill: Capillary refill takes less than 2 seconds.          Neurological:      General: No focal deficit present.      Mental Status: She is alert and oriented to person, place, and time.      Cranial Nerves: No cranial nerve deficit.      Sensory: Sensation is intact.      Motor: Motor function is intact.      Coordination: Coordination is intact.      Gait: Gait is intact.      Deep Tendon Reflexes: Reflexes are normal and symmetric.   Psychiatric:         Attention and Perception: Attention and perception normal.         Mood and Affect: Mood and affect normal.         Speech: Speech normal.         Behavior: Behavior normal. Behavior is cooperative.         Thought Content: Thought content normal.         Cognition and Memory: Cognition normal.         Judgment: Judgment normal.           Portions of the record may have been created with voice recognition software. Occasional wrong word or \"sound a like\" substitutions may have occurred " due to the inherent limitations of voice recognition software. Read the chart carefully and recognize, using context, where substitutions have occurred. Contact me with any questions.

## 2024-03-07 DIAGNOSIS — E78.01 FAMILIAL HYPERCHOLESTEROLEMIA: ICD-10-CM

## 2024-03-07 RX ORDER — ROSUVASTATIN CALCIUM 10 MG/1
10 TABLET, COATED ORAL DAILY
Qty: 90 TABLET | Refills: 3 | Status: SHIPPED | OUTPATIENT
Start: 2024-03-07

## 2024-03-27 ENCOUNTER — OFFICE VISIT (OUTPATIENT)
Dept: FAMILY MEDICINE CLINIC | Facility: CLINIC | Age: 74
End: 2024-03-27
Payer: MEDICARE

## 2024-03-27 VITALS
DIASTOLIC BLOOD PRESSURE: 82 MMHG | WEIGHT: 165.4 LBS | TEMPERATURE: 98.1 F | SYSTOLIC BLOOD PRESSURE: 126 MMHG | HEART RATE: 90 BPM | BODY MASS INDEX: 27.56 KG/M2 | OXYGEN SATURATION: 94 % | HEIGHT: 65 IN

## 2024-03-27 DIAGNOSIS — Z00.00 ENCOUNTER FOR MEDICARE ANNUAL WELLNESS EXAM: Primary | ICD-10-CM

## 2024-03-27 DIAGNOSIS — N18.31 STAGE 3A CHRONIC KIDNEY DISEASE (HCC): ICD-10-CM

## 2024-03-27 DIAGNOSIS — Z78.0 ASYMPTOMATIC MENOPAUSAL STATE: ICD-10-CM

## 2024-03-27 DIAGNOSIS — Z12.11 COLON CANCER SCREENING: ICD-10-CM

## 2024-03-27 DIAGNOSIS — Z12.31 ENCOUNTER FOR SCREENING MAMMOGRAM FOR MALIGNANT NEOPLASM OF BREAST: ICD-10-CM

## 2024-03-27 DIAGNOSIS — F51.01 PRIMARY INSOMNIA: ICD-10-CM

## 2024-03-27 DIAGNOSIS — E78.01 FAMILIAL HYPERCHOLESTEROLEMIA: ICD-10-CM

## 2024-03-27 DIAGNOSIS — E03.8 SUBCLINICAL HYPOTHYROIDISM: ICD-10-CM

## 2024-03-27 DIAGNOSIS — I10 ESSENTIAL HYPERTENSION: ICD-10-CM

## 2024-03-27 DIAGNOSIS — Z13.31 DEPRESSION SCREENING NEGATIVE: ICD-10-CM

## 2024-03-27 PROCEDURE — 99214 OFFICE O/P EST MOD 30 MIN: CPT | Performed by: NURSE PRACTITIONER

## 2024-03-27 PROCEDURE — G0439 PPPS, SUBSEQ VISIT: HCPCS | Performed by: NURSE PRACTITIONER

## 2024-03-27 RX ORDER — ZOLPIDEM TARTRATE 10 MG/1
TABLET ORAL
Qty: 90 TABLET | Refills: 0 | Status: SHIPPED | OUTPATIENT
Start: 2024-03-27

## 2024-03-27 RX ORDER — WHEAT DEXTRIN 3 G/3.8 G
POWDER (GRAM) ORAL
COMMUNITY

## 2024-03-27 NOTE — PROGRESS NOTES
Assessment and Plan:     Problem List Items Addressed This Visit     Familial hypercholesterolemia    Essential hypertension    Primary insomnia    Relevant Medications    zolpidem (AMBIEN) 10 mg tablet    Subclinical hypothyroidism    Stage 3a chronic kidney disease (HCC)   Other Visit Diagnoses     Encounter for Medicare annual wellness exam    -  Primary    Encounter for screening mammogram for malignant neoplasm of breast        Relevant Orders    Mammo screening bilateral w 3d & cad    Asymptomatic menopausal state        Relevant Orders    DXA bone density spine hip and pelvis    Depression screening negative        Colon cancer screening        Relevant Orders    Ambulatory Referral to Gastroenterology          Depression Screening and Follow-up Plan: Patient was screened for depression during today's encounter. They screened negative with a PHQ-2 score of 0.      Preventive health issues were discussed with patient, and age appropriate screening tests were ordered as noted in patient's After Visit Summary.  Personalized health advice and appropriate referrals for health education or preventive services given if needed, as noted in patient's After Visit Summary.     History of Present Illness:     Patient presents for a Medicare Wellness Visit    Patient presents with:  Medicare Wellness Visit    Pt offers no acute or interval issues/concerns to discuss.       Hypertension  This is a chronic problem. The problem is controlled. Pertinent negatives include no chest pain or shortness of breath. There are no associated agents to hypertension. Risk factors for coronary artery disease include post-menopausal state and dyslipidemia. Past treatments include calcium channel blockers and ACE inhibitors. The current treatment provides significant improvement. There are no compliance problems.  There is no history of angina, kidney disease or CAD/MI. There is no history of chronic renal disease.   Hyperlipidemia  This  is a chronic problem. The problem is controlled. She has no history of chronic renal disease. There are no known factors aggravating her hyperlipidemia. Pertinent negatives include no chest pain, focal sensory loss, focal weakness, leg pain, myalgias or shortness of breath. Current antihyperlipidemic treatment includes statins. The current treatment provides significant improvement of lipids. There are no compliance problems.  Risk factors for coronary artery disease include hypertension, dyslipidemia and post-menopausal.      Patient Care Team:  ANDRADE Rios as PCP - General (Internal Medicine)  FORTUNATO Woodson MD     Review of Systems:     Review of Systems   Respiratory:  Negative for shortness of breath.    Cardiovascular:  Negative for chest pain.   Musculoskeletal:  Negative for myalgias.   Neurological:  Negative for focal weakness.   All other systems reviewed and are negative.       Problem List:     Patient Active Problem List   Diagnosis   • Osteoarthrosis   • Familial hypercholesterolemia   • Foot pain, bilateral   • Essential hypertension   • Primary insomnia   • Actinic keratoses   • Allergic rhinitis   • Awakens from sleep at night   • Other dyschromia   • Disease of sebaceous glands   • Subclinical hypothyroidism   • Trigger finger   • Bunion of great toe of right foot   • Right hip pain   • Varicose veins of both legs with edema   • Stage 3a chronic kidney disease (HCC)      Past Medical and Surgical History:     Past Medical History:   Diagnosis Date   • Arthritis    • Cataract     starting   • Hallux valgus of right foot    • Hyperlipidemia    • Hypertension    • RLL pneumonia     Last Assessed:11/4/14   • Skin rash 11/30/2016   • Wears glasses      Past Surgical History:   Procedure Laterality Date   • BLADDER SUSPENSION     • COLONOSCOPY     • EYE SURGERY Right     Retinal Detachment complex   • CT CORRJ HLX VLGS BNCTY SESMDC DSTL METAR OSTEOT Right 09/29/2020     Procedure: BUNIONECTOMY JORGITO;  Surgeon: Diane Corbin DPM;  Location: [facility];  Service: Podiatry   • TUBAL LIGATION  1983   • WISDOM TOOTH EXTRACTION        Family History:     Family History   Problem Relation Age of Onset   • Heart disease Mother         Coronary disease   • Colon cancer Mother 72   • Diabetes Mother    • Heart disease Father         Coronary disease   • Diabetes Father    • No Known Problems Sister    • No Known Problems Daughter    • No Known Problems Maternal Grandmother    • No Known Problems Maternal Grandfather    • No Known Problems Paternal Grandmother    • No Known Problems Paternal Grandfather    • No Known Problems Son    • Colon cancer Family    • Coronary artery disease Family    • Diabetes type II Family    • No Known Problems Paternal Aunt       Social History:     Social History     Socioeconomic History   • Marital status: /Civil Union     Spouse name: None   • Number of children: None   • Years of education: None   • Highest education level: None   Occupational History   • Occupation: RETIRED   Tobacco Use   • Smoking status: Never   • Smokeless tobacco: Never   Vaping Use   • Vaping status: Never Used   Substance and Sexual Activity   • Alcohol use: Yes     Alcohol/week: 2.0 standard drinks of alcohol     Types: 1 Glasses of wine, 1 Cans of beer per week     Comment: 1 beer a week   • Drug use: No   • Sexual activity: Not Currently     Partners: Male   Other Topics Concern   • None   Social History Narrative        Retired-Teacher     Social Determinants of Health     Financial Resource Strain: Low Risk  (3/7/2023)    Overall Financial Resource Strain (CARDIA)    • Difficulty of Paying Living Expenses: Not hard at all   Food Insecurity: No Food Insecurity (3/27/2024)    Hunger Vital Sign    • Worried About Running Out of Food in the Last Year: Never true    • Ran Out of Food in the Last Year: Never true   Transportation Needs: No Transportation Needs  (3/27/2024)    PRAPARE - Transportation    • Lack of Transportation (Medical): No    • Lack of Transportation (Non-Medical): No   Physical Activity: Not on file   Stress: Not on file   Social Connections: Not on file   Intimate Partner Violence: Not on file   Housing Stability: Low Risk  (3/27/2024)    Housing Stability Vital Sign    • Unable to Pay for Housing in the Last Year: No    • Number of Places Lived in the Last Year: 1    • Unstable Housing in the Last Year: No      Medications and Allergies:     Current Outpatient Medications   Medication Sig Dispense Refill   • amLODIPine-benazepril (LOTREL) 10-40 MG per capsule Take 1 capsule by mouth daily 90 capsule 1   • ammonium lactate (LAC-HYDRIN) 12 % lotion APPLY TO DRY SKIN TWICE DAILY. RUB IN THOROUGHLY. AVOID OPEN SKIN     • Calcium Carbonate-Vitamin D 600-200 MG-UNIT CAPS Take 1 capsule by mouth daily     • cholecalciferol (VITAMIN D3) 1,000 units tablet Take 1,000 Units by mouth daily     • COLLAGEN PO Take by mouth     • cyanocobalamin (VITAMIN B-12) 500 mcg tablet Take 500 mcg by mouth daily     • Diclofenac Sodium (VOLTAREN) 1 % Apply 2 g topically 4 (four) times a day 350 g 2   • diclofenac sodium (VOLTAREN) 50 mg EC tablet TAKE 1 TABLET BY MOUTH  TWICE DAILY TAKE WITH FOOD 180 tablet 1   • nystatin (MYCOSTATIN) cream Apply topically 2 (two) times a day 15 g 0   • Prolensa 0.07 % SOLN      • rosuvastatin (CRESTOR) 10 MG tablet TAKE 1 TABLET BY MOUTH DAILY 90 tablet 3   • Wheat Dextrin (Benefiber) POWD Take by mouth     • zolpidem (AMBIEN) 10 mg tablet TAKE 1 TABLET BY MOUTH  EVERY NIGHT AT BEDTIME AS  NEEDED FOR INSOMNIA 90 tablet 0     No current facility-administered medications for this visit.     No Known Allergies   Immunizations:     Immunization History   Administered Date(s) Administered   • COVID-19 MODERNA VACC 0.25 ML IM BOOSTER 12/08/2021   • COVID-19 MODERNA VACC 0.5 ML IM 04/01/2021, 04/29/2021, 12/08/2021, 04/11/2022   • COVID-19 Pfizer  mRNA vacc PF shant-sucrose 12 yr and older (Comirnaty) 10/18/2023   • INFLUENZA 11/09/2015, 11/16/2016, 10/10/2017, 10/19/2022   • Influenza Split High Dose Preservative Free IM 10/21/2013, 01/08/2015, 11/09/2015, 11/16/2016, 10/10/2017   • Influenza, high dose seasonal 0.7 mL 09/25/2018, 10/24/2019, 09/21/2020, 10/13/2021, 10/19/2022, 09/27/2023   • Influenza, seasonal, injectable 09/26/2012   • Pneumococcal Conjugate 13-Valent 01/08/2015   • Pneumococcal Polysaccharide PPV23 11/14/2013   • Tdap 11/09/2015      Health Maintenance:         Topic Date Due   • DXA SCAN  11/29/2023   • Colorectal Cancer Screening  03/11/2024   • Breast Cancer Screening: Mammogram  07/06/2025   • Hepatitis C Screening  Completed         Topic Date Due   • COVID-19 Vaccine (7 - 2023-24 season) 12/13/2023      Medicare Screening Tests and Risk Assessments:     Talita is here for her Subsequent Wellness visit. Last Medicare Wellness visit information reviewed, patient interviewed and updates made to the record today.      Health Risk Assessment:   Patient rates overall health as very good. Patient feels that their physical health rating is same. Patient is very satisfied with their life. Eyesight was rated as slightly worse. Hearing was rated as same. Patient feels that their emotional and mental health rating is same. Patients states they are never, rarely angry. Patient states they are never, rarely unusually tired/fatigued. Pain experienced in the last 7 days has been some. Patient's pain rating has been 3/10. Patient states that she has experienced no weight loss or gain in last 6 months.     Depression Screening:   PHQ-2 Score: 0  PHQ-9 Score: 0      Fall Risk Screening:   In the past year, patient has experienced: no history of falling in past year      Urinary Incontinence Screening:   Patient has not leaked urine accidently in the last six months.     Home Safety:  Patient does not have trouble with stairs inside or outside of  their home. Patient has working smoke alarms and has working carbon monoxide detector. Home safety hazards include: loose rugs on the floor.     Nutrition:   Current diet is Regular.     Medications:   Patient is not currently taking any over-the-counter supplements. Patient is able to manage medications.     Activities of Daily Living (ADLs)/Instrumental Activities of Daily Living (IADLs):   Walk and transfer into and out of bed and chair?: Yes  Dress and groom yourself?: Yes    Bathe or shower yourself?: Yes    Feed yourself? Yes  Do your laundry/housekeeping?: Yes  Manage your money, pay your bills and track your expenses?: Yes  Make your own meals?: Yes    Do your own shopping?: Yes    Previous Hospitalizations:   Any hospitalizations or ED visits within the last 12 months?: No      Advance Care Planning:   Living will: Yes    Durable POA for healthcare: Yes    Advanced directive: Yes      Cognitive Screening:   Provider or family/friend/caregiver concerned regarding cognition?: No    PREVENTIVE SCREENINGS      Cardiovascular Screening:    General: Screening Not Indicated and History Lipid Disorder      Diabetes Screening:     General: Screening Current      Colorectal Cancer Screening:     General: Screening Current    Due for: Colonoscopy - Low Risk      Breast Cancer Screening:     General: Screening Current    Due for: Mammogram        Cervical Cancer Screening:    General: Screening Not Indicated      Osteoporosis Screening:    General: Screening Current    Due for: DXA Appendicular      Abdominal Aortic Aneurysm (AAA) Screening:        General: Screening Not Indicated      Lung Cancer Screening:     General: Screening Not Indicated      Hepatitis C Screening:    General: Screening Current    Screening, Brief Intervention, and Referral to Treatment (SBIRT)    Screening  Typical number of drinks in a day: 0  Typical number of drinks in a week: 2  Interpretation: Low risk drinking behavior.    AUDIT-C  Screenin) How often did you have a drink containing alcohol in the past year? 2 to 3 times a week  2) How many drinks did you have on a typical day when you were drinking in the past year? 0  3) How often did you have 6 or more drinks on one occasion in the past year? never    AUDIT-C Score: 3  Interpretation: Score 3-12 (female): POSITIVE screen for alcohol misuse    AUDIT Screenin) How often during the last year have you found that you were not able to stop drinking once you had started? 0 - never  5) How often during the last year have you failed to do what was normally expected from you because of drinking? 0 - never  6) How often during the last year have you needed a first drink in the morning to get yourself going after a heavy drinking session? 0 - never  7) How often during the last year have you had a feeling of guilt or remorse after drinking? 0 - never  8) How often during the last year have you been unable to remember what happened the night before because you had been drinking? 0 - never  9) Have you or someone else been injured as a result of your drinking? 0 - no  10) Has a relative or friend or a doctor or another health worker been concerned about your drinking or suggested you cut down? 0 - no    AUDIT Score: 3  Interpretation: Low risk alcohol consumption    Single Item Drug Screening:  How often have you used an illegal drug (including marijuana) or a prescription medication for non-medical reasons in the past year? never    Single Item Drug Screen Score: 0  Interpretation: Negative screen for possible drug use disorder    SDOH Risk Assessment  Social determinants of health (SDOH) risk assesment tool was completed. The tool at a minimum covered housing stability, food insecurity, transportation needs, and utility difficulty. Patient had at risk responses for the following SDOH domains: food insecurity.     Other Counseling Topics:   Car/seat belt/driving safety, skin self-exam,  "sunscreen and regular weightbearing exercise and calcium and vitamin D intake.     No results found.     Physical Exam:     /82   Pulse 90   Temp 98.1 °F (36.7 °C) (Tympanic)   Ht 5' 5\" (1.651 m)   Wt 75 kg (165 lb 6.4 oz)   SpO2 94%   BMI 27.52 kg/m²     Physical Exam  Vitals and nursing note reviewed.   Constitutional:       Appearance: Normal appearance. She is well-developed.   HENT:      Head: Normocephalic and atraumatic.      Right Ear: Tympanic membrane, ear canal and external ear normal.      Left Ear: Tympanic membrane, ear canal and external ear normal.      Nose: Nose normal.      Mouth/Throat:      Mouth: Mucous membranes are moist.   Eyes:      General: Lids are normal. Vision grossly intact.      Conjunctiva/sclera: Conjunctivae normal.      Pupils: Pupils are equal, round, and reactive to light.   Cardiovascular:      Rate and Rhythm: Normal rate and regular rhythm.      Pulses: Normal pulses.      Heart sounds: Normal heart sounds, S1 normal and S2 normal.      No friction rub. No gallop. No S3 sounds.   Pulmonary:      Effort: Pulmonary effort is normal.      Breath sounds: Normal breath sounds.   Abdominal:      General: Bowel sounds are normal.      Palpations: Abdomen is soft.      Tenderness: There is no abdominal tenderness.   Genitourinary:     Comments: Deferred  Musculoskeletal:         General: Normal range of motion.      Cervical back: Normal range of motion and neck supple.      Right lower leg: No edema.      Left lower leg: No edema.   Lymphadenopathy:      Cervical: No cervical adenopathy.   Skin:     General: Skin is warm and dry.      Capillary Refill: Capillary refill takes less than 2 seconds.   Neurological:      General: No focal deficit present.      Mental Status: She is alert and oriented to person, place, and time.      Motor: Motor function is intact.      Gait: Gait is intact.   Psychiatric:         Attention and Perception: Attention and perception normal.    "      Mood and Affect: Mood and affect normal.         Speech: Speech normal.         Behavior: Behavior normal. Behavior is cooperative.         Thought Content: Thought content normal.         Cognition and Memory: Cognition and memory normal.         Judgment: Judgment normal.       No visits with results within 1 Month(s) from this visit.   Latest known visit with results is:   Appointment on 09/07/2023   Component Date Value Ref Range Status   • Cholesterol 09/07/2023 132  See Comment mg/dL Final    Cholesterol:         Pediatric <18 Years        Desirable          <170 mg/dL      Borderline High    170-199 mg/dL      High               >=200 mg/dL        Adult >=18 Years            Desirable         <200 mg/dL      Borderline High   200-239 mg/dL      High              >239 mg/dL     • Triglycerides 09/07/2023 91  See Comment mg/dL Final    Triglyceride:     0-9Y            <75mg/dL     10Y-17Y         <90 mg/dL       >=18Y     Normal          <150 mg/dL     Borderline High 150-199 mg/dL     High            200-499 mg/dL        Very High       >499 mg/dL    Specimen collection should occur prior to Metamizole administration due to the potential for falsely depressed results.   • HDL, Direct 09/07/2023 55  >=50 mg/dL Final   • LDL Calculated 09/07/2023 59  0 - 100 mg/dL Final    LDL Cholesterol:     Optimal           <100 mg/dl     Near Optimal      100-129 mg/dl     Above Optimal       Borderline High 130-159 mg/dl       High            160-189 mg/dl       Very High       >189 mg/dl         This screening LDL is a calculated result.   It does not have the accuracy of the Direct Measured LDL in the monitoring of patients with hyperlipidemia and/or statin therapy.   Direct Measure LDL (MFF694) must be ordered separately in these patients.   • WBC 09/07/2023 6.61  4.31 - 10.16 Thousand/uL Final   • RBC 09/07/2023 4.18  3.81 - 5.12 Million/uL Final   • Hemoglobin 09/07/2023 12.7  11.5 - 15.4 g/dL Final   •  Hematocrit 09/07/2023 40.4  34.8 - 46.1 % Final   • MCV 09/07/2023 97  82 - 98 fL Final   • MCH 09/07/2023 30.4  26.8 - 34.3 pg Final   • MCHC 09/07/2023 31.4  31.4 - 37.4 g/dL Final   • RDW 09/07/2023 14.1  11.6 - 15.1 % Final   • MPV 09/07/2023 10.2  8.9 - 12.7 fL Final   • Platelets 09/07/2023 328  149 - 390 Thousands/uL Final   • nRBC 09/07/2023 0  /100 WBCs Final   • Neutrophils Relative 09/07/2023 33 (L)  43 - 75 % Final   • Immature Grans % 09/07/2023 0  0 - 2 % Final   • Lymphocytes Relative 09/07/2023 51 (H)  14 - 44 % Final   • Monocytes Relative 09/07/2023 11  4 - 12 % Final   • Eosinophils Relative 09/07/2023 4  0 - 6 % Final   • Basophils Relative 09/07/2023 1  0 - 1 % Final   • Neutrophils Absolute 09/07/2023 2.17  1.85 - 7.62 Thousands/µL Final   • Absolute Immature Grans 09/07/2023 0.01  0.00 - 0.20 Thousand/uL Final   • Absolute Lymphocytes 09/07/2023 3.38  0.60 - 4.47 Thousands/µL Final   • Absolute Monocytes 09/07/2023 0.74  0.17 - 1.22 Thousand/µL Final   • Eosinophils Absolute 09/07/2023 0.27  0.00 - 0.61 Thousand/µL Final   • Basophils Absolute 09/07/2023 0.04  0.00 - 0.10 Thousands/µL Final   • Sodium 09/07/2023 143  135 - 147 mmol/L Final   • Potassium 09/07/2023 3.9  3.5 - 5.3 mmol/L Final   • Chloride 09/07/2023 106  96 - 108 mmol/L Final   • CO2 09/07/2023 28  21 - 32 mmol/L Final   • ANION GAP 09/07/2023 9  mmol/L Final   • BUN 09/07/2023 24  5 - 25 mg/dL Final   • Creatinine 09/07/2023 1.12  0.60 - 1.30 mg/dL Final    Standardized to IDMS reference method   • Glucose, Fasting 09/07/2023 88  65 - 99 mg/dL Final   • Calcium 09/07/2023 9.8  8.4 - 10.2 mg/dL Final   • AST 09/07/2023 27  13 - 39 U/L Final   • ALT 09/07/2023 20  7 - 52 U/L Final    Specimen collection should occur prior to Sulfasalazine administration due to the potential for falsely depressed results.    • Alkaline Phosphatase 09/07/2023 51  34 - 104 U/L Final   • Total Protein 09/07/2023 6.9  6.4 - 8.4 g/dL Final   • Albumin  09/07/2023 4.0  3.5 - 5.0 g/dL Final   • Total Bilirubin 09/07/2023 0.46  0.20 - 1.00 mg/dL Final    Use of this assay is not recommended for patients undergoing treatment with eltrombopag due to the potential for falsely elevated results.  N-acetyl-p-benzoquinone imine (metabolite of Acetaminophen) will generate erroneously low results in samples for patients that have taken an overdose of Acetaminophen.   • eGFR 09/07/2023 48  ml/min/1.73sq m Final   • TSH 3RD GENERATON 09/07/2023 4.098  0.450 - 4.500 uIU/mL Final    The recommended reference ranges for TSH during pregnancy are as follows:   First trimester 0.1 to 2.5 uIU/mL   Second trimester  0.2 to 3.0 uIU/mL   Third trimester 0.3 to 3.0 uIU/m    Note: Normal ranges may not apply to patients who are transgender, non-binary, or whose legal sex, sex at birth, and gender identity differ.  Adult TSH (3rd generation) reference range follows the recommended guidelines of the [employer], January, 2020.       I have reviewed all the lab results. There are some abnormalities that are not critical to the patient's health, I have discussed these in person at this office appointment.       ANDRADE Rios

## 2024-03-27 NOTE — PATIENT INSTRUCTIONS
Medicare Preventive Visit Patient Instructions  Thank you for completing your Welcome to Medicare Visit or Medicare Annual Wellness Visit today. Your next wellness visit will be due in one year (3/28/2025).  The screening/preventive services that you may require over the next 5-10 years are detailed below. Some tests may not apply to you based off risk factors and/or age. Screening tests ordered at today's visit but not completed yet may show as past due. Also, please note that scanned in results may not display below.  Preventive Screenings:  Service Recommendations Previous Testing/Comments   Colorectal Cancer Screening  * Colonoscopy    * Fecal Occult Blood Test (FOBT)/Fecal Immunochemical Test (FIT)  * Fecal DNA/Cologuard Test  * Flexible Sigmoidoscopy Age: 45-75 years old   Colonoscopy: every 10 years (may be performed more frequently if at higher risk)  OR  FOBT/FIT: every 1 year  OR  Cologuard: every 3 years  OR  Sigmoidoscopy: every 5 years  Screening may be recommended earlier than age 45 if at higher risk for colorectal cancer. Also, an individualized decision between you and your healthcare provider will decide whether screening between the ages of 76-85 would be appropriate. Colonoscopy: 03/11/2019  FOBT/FIT: Not on file  Cologuard: Not on file  Sigmoidoscopy: Not on file    Screening Current     Breast Cancer Screening Age: 40+ years old  Frequency: every 1-2 years  Not required if history of left and right mastectomy Mammogram: 07/06/2023    Screening Current   Cervical Cancer Screening Between the ages of 21-29, pap smear recommended once every 3 years.   Between the ages of 30-65, can perform pap smear with HPV co-testing every 5 years.   Recommendations may differ for women with a history of total hysterectomy, cervical cancer, or abnormal pap smears in past. Pap Smear: 04/17/2019    Screening Not Indicated   Hepatitis C Screening Once for adults born between 1945 and 1965  More frequently in  patients at high risk for Hepatitis C Hep C Antibody: 01/03/2017    Screening Current   Diabetes Screening 1-2 times per year if you're at risk for diabetes or have pre-diabetes Fasting glucose: 88 mg/dL (9/7/2023)  A1C: No results in last 5 years (No results in last 5 years)  Screening Current   Cholesterol Screening Once every 5 years if you don't have a lipid disorder. May order more often based on risk factors. Lipid panel: 09/07/2023    Screening Not Indicated  History Lipid Disorder     Other Preventive Screenings Covered by Medicare:  Abdominal Aortic Aneurysm (AAA) Screening: covered once if your at risk. You're considered to be at risk if you have a family history of AAA.  Lung Cancer Screening: covers low dose CT scan once per year if you meet all of the following conditions: (1) Age 55-77; (2) No signs or symptoms of lung cancer; (3) Current smoker or have quit smoking within the last 15 years; (4) You have a tobacco smoking history of at least 20 pack years (packs per day multiplied by number of years you smoked); (5) You get a written order from a healthcare provider.  Glaucoma Screening: covered annually if you're considered high risk: (1) You have diabetes OR (2) Family history of glaucoma OR (3)  aged 50 and older OR (4)  American aged 65 and older  Osteoporosis Screening: covered every 2 years if you meet one of the following conditions: (1) You're estrogen deficient and at risk for osteoporosis based off medical history and other findings; (2) Have a vertebral abnormality; (3) On glucocorticoid therapy for more than 3 months; (4) Have primary hyperparathyroidism; (5) On osteoporosis medications and need to assess response to drug therapy.   Last bone density test (DXA Scan): 11/29/2021.  HIV Screening: covered annually if you're between the age of 15-65. Also covered annually if you are younger than 15 and older than 65 with risk factors for HIV infection. For pregnant  patients, it is covered up to 3 times per pregnancy.    Immunizations:  Immunization Recommendations   Influenza Vaccine Annual influenza vaccination during flu season is recommended for all persons aged >= 6 months who do not have contraindications   Pneumococcal Vaccine   * Pneumococcal conjugate vaccine = PCV13 (Prevnar 13), PCV15 (Vaxneuvance), PCV20 (Prevnar 20)  * Pneumococcal polysaccharide vaccine = PPSV23 (Pneumovax) Adults 19-63 yo with certain risk factors or if 65+ yo  If never received any pneumonia vaccine: recommend Prevnar 20 (PCV20)  Give PCV20 if previously received 1 dose of PCV13 or PPSV23   Hepatitis B Vaccine 3 dose series if at intermediate or high risk (ex: diabetes, end stage renal disease, liver disease)   Respiratory syncytial virus (RSV) Vaccine - COVERED BY MEDICARE PART D  * RSVPreF3 (Arexvy) CDC recommends that adults 60 years of age and older may receive a single dose of RSV vaccine using shared clinical decision-making (SCDM)   Tetanus (Td) Vaccine - COST NOT COVERED BY MEDICARE PART B Following completion of primary series, a booster dose should be given every 10 years to maintain immunity against tetanus. Td may also be given as tetanus wound prophylaxis.   Tdap Vaccine - COST NOT COVERED BY MEDICARE PART B Recommended at least once for all adults. For pregnant patients, recommended with each pregnancy.   Shingles Vaccine (Shingrix) - COST NOT COVERED BY MEDICARE PART B  2 shot series recommended in those 19 years and older who have or will have weakened immune systems or those 50 years and older     Health Maintenance Due:      Topic Date Due   • DXA SCAN  11/29/2023   • Colorectal Cancer Screening  03/11/2024   • Breast Cancer Screening: Mammogram  07/06/2025   • Hepatitis C Screening  Completed     Immunizations Due:      Topic Date Due   • COVID-19 Vaccine (7 - 2023-24 season) 12/13/2023     Advance Directives   What are advance directives?  Advance directives are legal  documents that state your wishes and plans for medical care. These plans are made ahead of time in case you lose your ability to make decisions for yourself. Advance directives can apply to any medical decision, such as the treatments you want, and if you want to donate organs.   What are the types of advance directives?  There are many types of advance directives, and each state has rules about how to use them. You may choose a combination of any of the following:  Living will:  This is a written record of the treatment you want. You can also choose which treatments you do not want, which to limit, and which to stop at a certain time. This includes surgery, medicine, IV fluid, and tube feedings.   Durable power of  for healthcare (DPAHC):  This is a written record that states who you want to make healthcare choices for you when you are unable to make them for yourself. This person, called a proxy, is usually a family member or a friend. You may choose more than 1 proxy.  Do not resuscitate (DNR) order:  A DNR order is used in case your heart stops beating or you stop breathing. It is a request not to have certain forms of treatment, such as CPR. A DNR order may be included in other types of advance directives.  Medical directive:  This covers the care that you want if you are in a coma, near death, or unable to make decisions for yourself. You can list the treatments you want for each condition. Treatment may include pain medicine, surgery, blood transfusions, dialysis, IV or tube feedings, and a ventilator (breathing machine).  Values history:  This document has questions about your views, beliefs, and how you feel and think about life. This information can help others choose the care that you would choose.  Why are advance directives important?  An advance directive helps you control your care. Although spoken wishes may be used, it is better to have your wishes written down. Spoken wishes can be  misunderstood, or not followed. Treatments may be given even if you do not want them. An advance directive may make it easier for your family to make difficult choices about your care.   Weight Management   Why it is important to manage your weight:  Being overweight increases your risk of health conditions such as heart disease, high blood pressure, type 2 diabetes, and certain types of cancer. It can also increase your risk for osteoarthritis, sleep apnea, and other respiratory problems. Aim for a slow, steady weight loss. Even a small amount of weight loss can lower your risk of health problems.  How to lose weight safely:  A safe and healthy way to lose weight is to eat fewer calories and get regular exercise. You can lose up about 1 pound a week by decreasing the number of calories you eat by 500 calories each day.   Healthy meal plan for weight management:  A healthy meal plan includes a variety of foods, contains fewer calories, and helps you stay healthy. A healthy meal plan includes the following:  Eat whole-grain foods more often.  A healthy meal plan should contain fiber. Fiber is the part of grains, fruits, and vegetables that is not broken down by your body. Whole-grain foods are healthy and provide extra fiber in your diet. Some examples of whole-grain foods are whole-wheat breads and pastas, oatmeal, brown rice, and bulgur.  Eat a variety of vegetables every day.  Include dark, leafy greens such as spinach, kale, mundo greens, and mustard greens. Eat yellow and orange vegetables such as carrots, sweet potatoes, and winter squash.   Eat a variety of fruits every day.  Choose fresh or canned fruit (canned in its own juice or light syrup) instead of juice. Fruit juice has very little or no fiber.  Eat low-fat dairy foods.  Drink fat-free (skim) milk or 1% milk. Eat fat-free yogurt and low-fat cottage cheese. Try low-fat cheeses such as mozzarella and other reduced-fat cheeses.  Choose meat and other  "protein foods that are low in fat.  Choose beans or other legumes such as split peas or lentils. Choose fish, skinless poultry (chicken or turkey), or lean cuts of red meat (beef or pork). Before you cook meat or poultry, cut off any visible fat.   Use less fat and oil.  Try baking foods instead of frying them. Add less fat, such as margarine, sour cream, regular salad dressing and mayonnaise to foods. Eat fewer high-fat foods. Some examples of high-fat foods include french fries, doughnuts, ice cream, and cakes.  Eat fewer sweets.  Limit foods and drinks that are high in sugar. This includes candy, cookies, regular soda, and sweetened drinks.  Exercise:  Exercise at least 30 minutes per day on most days of the week. Some examples of exercise include walking, biking, dancing, and swimming. You can also fit in more physical activity by taking the stairs instead of the elevator or parking farther away from stores. Ask your healthcare provider about the best exercise plan for you.   Alcohol Use and Your Health    Drinking too much can harm your health.  Excessive alcohol use leads to about 88,000 death in the [place] each year, and shortens the life of those who diet by almost 30 years.  Further, excessive drinking cost the economy $249 billion in 2010.  Most excessive drinkers are not alcohol dependent.    Excessive alcohol use has immediate effects that increase the risk of many harmful health conditions.  These are most often the result of binge drinking.  Over time, excessive alcohol use can lead to the development of chronic diseases and other series health problems.    What is considered a \"drink\"?        Excessive alcohol use includes:  Binge Drinking: For women, 4 or more drinks consumed on one occasion. For men, 5 or more drinks consumed on one occasion.  Heavy Drinking: For women, 8 or more drinks per week. For men, 15 or more drinks per week  Any alcohol used by pregnant women  Any alcohol used by " those under the age of 21 years    If you choose to drink, do so in moderation:  Do not drink at all if you are under the age of 21, or if you are or may be pregnant, or have health problems that could be made worse by drinking.  For women, up to 1 drink per day  For men, up to 2 drinks a day    No one should begin drinking or drink more frequently based on potential health benefits    Short-Term Health Risks:  Injuries: motor vehicle crashes, falls, drownings, burns  Violence: homicide, suicide, sexual assault, intimate partner violence  Alcohol poisoning  Reproductive health: risky sexual behaviors, unintended prengnacy, sexually transmitted diseases, miscarriage, stillbirth, fetal alcohol syndrome    Long-Term Health Risks:  Chronic diseases: high blood pressure, heart disease, stroke, liver disease, digestive problems  Cancers: breast, mouth and throat, liver, colon  Learning and memory problems: dementia, poor school performance  Mental health: depression, anxiety, insomnia  Social problems: lost productivity, family problems, unemployment  Alcohol dependence    For support and more information:  Substance Abuse and Mental Health Services Administration  [address]  [place] [address]  Web Address: http://www.samhsa.gov    Alcoholics Anonymous        Web Address: http://www.aa.org    https://www.cdc.gov/alcohol/fact-sheets/alcohol-use.htm     © Copyright [employer] 2018 Information is for End User's use only and may not be sold, redistributed or otherwise used for commercial purposes. All illustrations and images included in CareNotes® are the copyrighted property of [employer].D.A.M., Inc. or [employer]

## 2024-04-25 DIAGNOSIS — E78.01 FAMILIAL HYPERCHOLESTEROLEMIA: ICD-10-CM

## 2024-04-25 NOTE — TELEPHONE ENCOUNTER
Did not receive refill from [facility] from     Reason for call:   [x] Refill   [] Prior Auth  [] Other:     Office:   [x] PCP/Provider -   [] Specialty/Provider -     Medication: Crestor    Dose/Frequency: 10 mg     Quantity: #90    Pharmacy: [facility]    Does the patient have enough for 3 days?   [x] Yes   [] No - Send as HP to POD

## 2024-04-26 RX ORDER — ROSUVASTATIN CALCIUM 10 MG/1
10 TABLET, COATED ORAL DAILY
Qty: 90 TABLET | Refills: 1 | Status: SHIPPED | OUTPATIENT
Start: 2024-04-26

## 2024-05-23 DIAGNOSIS — I10 ESSENTIAL HYPERTENSION: ICD-10-CM

## 2024-05-23 RX ORDER — AMLODIPINE AND BENAZEPRIL HYDROCHLORIDE 10; 40 MG/1; MG/1
1 CAPSULE ORAL DAILY
Qty: 90 CAPSULE | Refills: 1 | Status: SHIPPED | OUTPATIENT
Start: 2024-05-23

## 2024-06-10 ENCOUNTER — TELEPHONE (OUTPATIENT)
Age: 74
End: 2024-06-10

## 2024-06-10 ENCOUNTER — PREP FOR PROCEDURE (OUTPATIENT)
Age: 74
End: 2024-06-10

## 2024-06-10 DIAGNOSIS — Z12.11 SCREENING FOR COLON CANCER: Primary | ICD-10-CM

## 2024-06-10 NOTE — TELEPHONE ENCOUNTER
06/10/24  Screened by: Kathia Macias    Referring Provider     Pre- Screening:     There is no height or weight on file to calculate BMI.27.52  Has patient been referred for a routine screening Colonoscopy? yes  Is the patient between 45-75 years old? yes      Previous Colonoscopy yes   If yes:    Date: 5 YEARS AGO    Facility:     Reason:     Does the patient want to see a Gastroenterologist prior to their procedure OR are they having any GI symptoms? no    Has the patient been hospitalized or had abdominal surgery in the past 6 months? no    Does the patient use supplemental oxygen? no    Does the patient take Coumadin, Lovenox, Plavix, Elliquis, Xarelto, or other blood thinning medication? no    Has the patient had a stroke, cardiac event, or stent placed in the past year? no      If patient is between 45yrs - 49yrs, please advise patient that we will have to confirm benefits & coverage with their insurance company for a routine screening colonoscopy.

## 2024-06-10 NOTE — TELEPHONE ENCOUNTER
Scheduled date of colonoscopy (as of today):09/04/2024  Physician performing colonoscopy:Dr. Feliz  Location of colonoscopy:[facility]  Bowel prep reviewed with patient:Justen/Dul  Instructions reviewed with patient by:GABBY-Justen/Dul prep instructions sent via email to [email or web address]   Clearances: n/a

## 2024-07-10 DIAGNOSIS — I10 ESSENTIAL HYPERTENSION: ICD-10-CM

## 2024-07-10 RX ORDER — AMLODIPINE AND BENAZEPRIL HYDROCHLORIDE 10; 40 MG/1; MG/1
1 CAPSULE ORAL DAILY
Qty: 100 CAPSULE | Refills: 1 | Status: SHIPPED | OUTPATIENT
Start: 2024-07-10

## 2024-07-10 NOTE — TELEPHONE ENCOUNTER
Medication: amlodipine-benazepril    Dose/Frequency: 10/40mg- take 1 capsule by mouth daily    Quantity: 90    Pharmacy: [facility] - [place], [place] - [address]      Office:   [x] PCP/Provider -Azael Delgado   [] Speciality/Provider -     Does the patient have enough for 3 days?   [x] Yes   [] No - Send as HP to POD

## 2024-07-11 ENCOUNTER — HOSPITAL ENCOUNTER (OUTPATIENT)
Dept: BONE DENSITY | Facility: HOSPITAL | Age: 74
End: 2024-07-11
Payer: MEDICARE

## 2024-07-11 ENCOUNTER — HOSPITAL ENCOUNTER (OUTPATIENT)
Dept: MAMMOGRAPHY | Facility: HOSPITAL | Age: 74
End: 2024-07-11
Payer: MEDICARE

## 2024-07-11 VITALS — WEIGHT: 162 LBS | HEIGHT: 65 IN | BODY MASS INDEX: 26.99 KG/M2

## 2024-07-11 DIAGNOSIS — Z12.31 ENCOUNTER FOR SCREENING MAMMOGRAM FOR MALIGNANT NEOPLASM OF BREAST: ICD-10-CM

## 2024-07-11 DIAGNOSIS — Z78.0 ASYMPTOMATIC MENOPAUSAL STATE: ICD-10-CM

## 2024-07-11 PROCEDURE — 77067 SCR MAMMO BI INCL CAD: CPT

## 2024-07-11 PROCEDURE — 77063 BREAST TOMOSYNTHESIS BI: CPT

## 2024-07-11 PROCEDURE — 77080 DXA BONE DENSITY AXIAL: CPT

## 2024-08-10 DIAGNOSIS — M79.671 FOOT PAIN, BILATERAL: ICD-10-CM

## 2024-08-10 DIAGNOSIS — M79.672 FOOT PAIN, BILATERAL: ICD-10-CM

## 2024-08-12 ENCOUNTER — HOSPITAL ENCOUNTER (OUTPATIENT)
Dept: MAMMOGRAPHY | Facility: HOSPITAL | Age: 74
Discharge: HOME | End: 2024-08-12
Payer: MEDICARE

## 2024-08-12 ENCOUNTER — HOSPITAL ENCOUNTER (OUTPATIENT)
Dept: ULTRASOUND IMAGING | Facility: HOSPITAL | Age: 74
Discharge: HOME | End: 2024-08-12
Payer: MEDICARE

## 2024-08-12 DIAGNOSIS — R92.8 ABNORMAL MAMMOGRAM: ICD-10-CM

## 2024-08-12 PROCEDURE — 77065 DX MAMMO INCL CAD UNI: CPT

## 2024-08-12 PROCEDURE — G0279 TOMOSYNTHESIS, MAMMO: HCPCS

## 2024-08-12 PROCEDURE — 76642 ULTRASOUND BREAST LIMITED: CPT

## 2024-09-04 ENCOUNTER — ANESTHESIA EVENT (OUTPATIENT)
Dept: GASTROENTEROLOGY | Facility: HOSPITAL | Age: 74
End: 2024-09-04

## 2024-09-04 ENCOUNTER — HOSPITAL ENCOUNTER (OUTPATIENT)
Dept: GASTROENTEROLOGY | Facility: HOSPITAL | Age: 74
Setting detail: OUTPATIENT SURGERY
Discharge: HOME | End: 2024-09-04
Attending: INTERNAL MEDICINE
Payer: MEDICARE

## 2024-09-04 ENCOUNTER — ANESTHESIA (OUTPATIENT)
Dept: GASTROENTEROLOGY | Facility: HOSPITAL | Age: 74
End: 2024-09-04

## 2024-09-04 VITALS
DIASTOLIC BLOOD PRESSURE: 59 MMHG | OXYGEN SATURATION: 98 % | SYSTOLIC BLOOD PRESSURE: 101 MMHG | BODY MASS INDEX: 26.99 KG/M2 | TEMPERATURE: 98.5 F | WEIGHT: 162 LBS | HEIGHT: 65 IN | HEART RATE: 70 BPM | RESPIRATION RATE: 18 BRPM

## 2024-09-04 DIAGNOSIS — Z12.11 SCREENING FOR COLON CANCER: ICD-10-CM

## 2024-09-04 PROCEDURE — 88305 TISSUE EXAM BY PATHOLOGIST: CPT | Performed by: PATHOLOGY

## 2024-09-04 PROCEDURE — 45380 COLONOSCOPY AND BIOPSY: CPT | Performed by: INTERNAL MEDICINE

## 2024-09-04 RX ORDER — SODIUM CHLORIDE, SODIUM LACTATE, POTASSIUM CHLORIDE, CALCIUM CHLORIDE 600; 310; 30; 20 MG/100ML; MG/100ML; MG/100ML; MG/100ML
INJECTION, SOLUTION INTRAVENOUS CONTINUOUS PRN
Status: DISCONTINUED | OUTPATIENT
Start: 2024-09-04 | End: 2024-09-04

## 2024-09-04 RX ORDER — PROPOFOL 10 MG/ML
INJECTION, EMULSION INTRAVENOUS CONTINUOUS PRN
Status: DISCONTINUED | OUTPATIENT
Start: 2024-09-04 | End: 2024-09-04

## 2024-09-04 RX ORDER — LIDOCAINE HYDROCHLORIDE 20 MG/ML
INJECTION, SOLUTION EPIDURAL; INFILTRATION; INTRACAUDAL; PERINEURAL AS NEEDED
Status: DISCONTINUED | OUTPATIENT
Start: 2024-09-04 | End: 2024-09-04

## 2024-09-04 RX ORDER — PHENYLEPHRINE HCL IN 0.9% NACL 1 MG/10 ML
SYRINGE (ML) INTRAVENOUS AS NEEDED
Status: DISCONTINUED | OUTPATIENT
Start: 2024-09-04 | End: 2024-09-04

## 2024-09-04 RX ORDER — PROPOFOL 10 MG/ML
INJECTION, EMULSION INTRAVENOUS AS NEEDED
Status: DISCONTINUED | OUTPATIENT
Start: 2024-09-04 | End: 2024-09-04

## 2024-09-04 RX ADMIN — PROPOFOL 100 MG: 10 INJECTION, EMULSION INTRAVENOUS at 09:30

## 2024-09-04 RX ADMIN — PROPOFOL 50 MG: 10 INJECTION, EMULSION INTRAVENOUS at 09:32

## 2024-09-04 RX ADMIN — LIDOCAINE HYDROCHLORIDE 50 MG: 20 INJECTION, SOLUTION EPIDURAL; INFILTRATION; INTRACAUDAL; PERINEURAL at 09:30

## 2024-09-04 RX ADMIN — Medication 100 MCG: at 09:46

## 2024-09-04 RX ADMIN — PROPOFOL 100 MCG/KG/MIN: 10 INJECTION, EMULSION INTRAVENOUS at 09:32

## 2024-09-04 RX ADMIN — SODIUM CHLORIDE, SODIUM LACTATE, POTASSIUM CHLORIDE, AND CALCIUM CHLORIDE: .6; .31; .03; .02 INJECTION, SOLUTION INTRAVENOUS at 09:13

## 2024-09-04 NOTE — H&P
History and Physical - [facility]  Talita Dumont 74 y.o. female MRN: [id number]                  HPI: Talita Dumont is a 74 y.o. year old female who presents for open access outpatient screening colonoscopy.  She her last colonoscopy was done on March 11, 2019 in [place] by Dr. Hitesh Herr.  She had multiple diverticula sigmoid colon.  A repeat colonoscopy 5 years was recommended.    Her mom had colorectal cancer in her 70s.  Patient has had a variety of other colonoscopies and believes she may have had small colon polyps in the past.  She denies any diarrhea constipation bleeding or black stools.  From an upper GI standpoint denies any heartburn indigestion dysphagia nausea or vomiting.    She does not smoke tobacco.  She may have 2 drinks a week.      REVIEW OF SYSTEMS: Per the HPI, and otherwise unremarkable.    Historical Information   Past Medical History:   Diagnosis Date    Arthritis     Cataract     starting    Hallux valgus of right foot     Hyperlipidemia     Hypertension     RLL pneumonia     Last Assessed:11/4/14    Skin rash 11/30/2016    Wears glasses      Past Surgical History:   Procedure Laterality Date    BLADDER SUSPENSION      COLONOSCOPY      EYE SURGERY Right     Retinal Detachment complex    WA CORRJ HLX VLGS BNCTY SESMDC DSTL METAR OSTEOT Right 09/29/2020    Procedure: BUNIONECTOMY JORGITO;  Surgeon: Diane Corbin DPM;  Location: [facility] OR;  Service: Podiatry    TUBAL LIGATION  1983    WISDOM TOOTH EXTRACTION       Social History   Social History     Substance and Sexual Activity   Alcohol Use Yes    Alcohol/week: 2.0 standard drinks of alcohol    Types: 1 Glasses of wine, 1 Cans of beer per week    Comment: 1 beer a week     Social History     Substance and Sexual Activity   Drug Use No     Social History     Tobacco Use   Smoking Status Never   Smokeless Tobacco Never     Family History   Problem Relation Age of Onset    Heart disease Mother         Coronary  "disease    Colon cancer Mother 72    Diabetes Mother     Heart disease Father         Coronary disease    Diabetes Father     No Known Problems Sister     No Known Problems Daughter     No Known Problems Maternal Grandmother     No Known Problems Maternal Grandfather     No Known Problems Paternal Grandmother     No Known Problems Paternal Grandfather     No Known Problems Son     Colon cancer Family     Coronary artery disease Family     Diabetes type II Family     No Known Problems Paternal Aunt        Meds/Allergies       Current Outpatient Medications:     amLODIPine-benazepril (LOTREL) 10-40 MG per capsule    Calcium Carbonate-Vitamin D 600-200 MG-UNIT CAPS    cholecalciferol (VITAMIN D3) 1,000 units tablet    COLLAGEN PO    cyanocobalamin (VITAMIN B-12) 500 mcg tablet    rosuvastatin (CRESTOR) 10 MG tablet    zolpidem (AMBIEN) 10 mg tablet    ammonium lactate (LAC-HYDRIN) 12 % lotion    Diclofenac Sodium (VOLTAREN) 1 %    diclofenac sodium (VOLTAREN) 50 mg EC tablet    nystatin (MYCOSTATIN) cream    Prolensa 0.07 % SOLN    Wheat Dextrin (Benefiber) POWD    No Known Allergies    Objective     /70   Pulse 88   Temp 97.6 °F (36.4 °C) (Temporal)   Resp 18   Ht 5' 5\" (1.651 m)   Wt 73.5 kg (162 lb)   SpO2 100%   BMI 26.96 kg/m²       PHYSICAL EXAM    Gen: NAD  Head: NCAT  CV: RRR  CHEST: Clear  ABD: soft, NT/ND  EXT: no edema      ASSESSMENT/PLAN:  This is a 74 y.o. year old female here for colonoscopy, and she is stable and optimized for her procedure.        "

## 2024-09-04 NOTE — ANESTHESIA PREPROCEDURE EVALUATION
Procedure:  COLONOSCOPY    Relevant Problems   CARDIO   (+) Essential hypertension   (+) Familial hypercholesterolemia      ENDO   (+) Subclinical hypothyroidism      /RENAL   (+) Stage 3a chronic kidney disease (HCC)      MUSCULOSKELETAL   (+) Osteoarthrosis      Lab Results   Component Value Date    WBC 6.61 09/07/2023    HGB 12.7 09/07/2023    HCT 40.4 09/07/2023    MCV 97 09/07/2023     09/07/2023         Physical Exam    Airway    Mallampati score: II  TM Distance: >3 FB  Neck ROM: full     Dental   No notable dental hx     Cardiovascular  Rhythm: regular, Rate: normal    Pulmonary   Breath sounds clear to auscultation    Other Findings  Intercisor Distance > 3cm    post-pubertal.      Anesthesia Plan  ASA Score- 3     Anesthesia Type- IV sedation with anesthesia with ASA Monitors.         Additional Monitors:     Airway Plan:     Comment: NPO appropriate. Discussed benefits/risks of monitored anesthetic care which involves providing a dynamic level of mild to deep sedation. Complications include awareness and/or airway obstruction/aspiration which may necessitate conversion to general anesthesia. All questions answered. Patient understands and wishes to proceed. .       Plan Factors-Exercise tolerance (METS): >4 METS.    Chart reviewed. EKG reviewed.  Existing labs reviewed.                   Induction-     Postoperative Plan- Plan for postoperative opioid use.         Informed Consent- Anesthetic plan and risks discussed with patient.  I personally reviewed this patient with the CRNA. Discussed and agreed on the Anesthesia Plan with the CRNA..

## 2024-09-09 PROCEDURE — 88305 TISSUE EXAM BY PATHOLOGIST: CPT | Performed by: PATHOLOGY

## 2024-09-09 NOTE — RESULT ENCOUNTER NOTE
Please inform patient that biopsy of her colon polyp was a benign 9 growing type of polyp.  Based upon these findings I would recommend repeat colonoscopy in 5 years due to a family history of colon cancer provided she remains in excellent health at 79 years of age.  Please recall for repeat colonoscopy in 5 years.  Thank you

## 2024-09-16 ENCOUNTER — NURSE TRIAGE (OUTPATIENT)
Age: 74
End: 2024-09-16

## 2024-09-16 ENCOUNTER — TELEPHONE (OUTPATIENT)
Age: 74
End: 2024-09-16

## 2024-09-16 NOTE — TELEPHONE ENCOUNTER
Logan Hunter Trini, DO  9/9/2024  2:03 PM EDT       Please inform patient that biopsy of her colon polyp was a benign 9 growing type of polyp.  Based upon these findings I would recommend repeat colonoscopy in 5 years due to a family history of colon cancer provided she remains in excellent health at 79 years of age.  Please recall for repeat colonoscopy in 5 years.  Thank you     SPOKE WITH PT, INFORMED OF ABOVE RESULTS AND RECOMMENDATIONS PER PROVIDER.

## 2024-09-16 NOTE — TELEPHONE ENCOUNTER
Patients GI provider:  Dr. SALTER    Number to return call: ([phone]    Reason for call: Pt returning call for results. Please return patients call.    Scheduled procedure/appointment date if applicable: Apt/procedure

## 2024-10-03 ENCOUNTER — TELEPHONE (OUTPATIENT)
Age: 74
End: 2024-10-03

## 2024-10-03 DIAGNOSIS — Z00.00 MEDICARE ANNUAL WELLNESS VISIT, SUBSEQUENT: Chronic | ICD-10-CM

## 2024-10-03 DIAGNOSIS — D51.8 OTHER VITAMIN B12 DEFICIENCY ANEMIAS: ICD-10-CM

## 2024-10-03 DIAGNOSIS — Z13.0 SCREENING FOR IRON DEFICIENCY ANEMIA: Chronic | ICD-10-CM

## 2024-10-03 DIAGNOSIS — Z13.220 SCREENING FOR HYPERLIPIDEMIA: Chronic | ICD-10-CM

## 2024-10-03 DIAGNOSIS — D52.0 DIETARY FOLATE DEFICIENCY ANEMIA: ICD-10-CM

## 2024-10-03 DIAGNOSIS — Z13.1 SCREENING FOR DIABETES MELLITUS: Chronic | ICD-10-CM

## 2024-10-03 DIAGNOSIS — E55.9 VITAMIN D DEFICIENCY: Chronic | ICD-10-CM

## 2024-10-03 DIAGNOSIS — E03.8 SUBCLINICAL HYPOTHYROIDISM: Primary | Chronic | ICD-10-CM

## 2024-10-03 PROBLEM — F51.01 PRIMARY INSOMNIA: Chronic | Status: ACTIVE | Noted: 2018-06-21

## 2024-10-03 PROBLEM — I83.893 VARICOSE VEINS OF BOTH LEGS WITH EDEMA: Chronic | Status: ACTIVE | Noted: 2019-06-20

## 2024-10-03 PROBLEM — N18.31 STAGE 3A CHRONIC KIDNEY DISEASE (HCC): Chronic | Status: ACTIVE | Noted: 2023-09-27

## 2024-10-03 PROBLEM — I10 ESSENTIAL HYPERTENSION: Chronic | Status: ACTIVE | Noted: 2018-05-22

## 2024-10-03 NOTE — TELEPHONE ENCOUNTER
Called pt back Melissa told me that someone has already called her back and they will be putting the order in for her. Can you put the BW in for her?

## 2024-10-03 NOTE — TELEPHONE ENCOUNTER
Patient requesting labs to be done before his visit .  The last time gotten she's  blood work done was 9/2023

## 2024-10-04 ENCOUNTER — APPOINTMENT (OUTPATIENT)
Dept: LAB | Facility: CLINIC | Age: 74
End: 2024-10-04
Payer: MEDICARE

## 2024-10-04 DIAGNOSIS — Z00.00 MEDICARE ANNUAL WELLNESS VISIT, SUBSEQUENT: Chronic | ICD-10-CM

## 2024-10-04 DIAGNOSIS — Z13.0 SCREENING FOR IRON DEFICIENCY ANEMIA: ICD-10-CM

## 2024-10-04 DIAGNOSIS — E03.8 SUBCLINICAL HYPOTHYROIDISM: Chronic | ICD-10-CM

## 2024-10-04 DIAGNOSIS — Z13.220 SCREENING FOR HYPERLIPIDEMIA: Chronic | ICD-10-CM

## 2024-10-04 DIAGNOSIS — E55.9 VITAMIN D DEFICIENCY: Chronic | ICD-10-CM

## 2024-10-04 DIAGNOSIS — D52.0 DIETARY FOLATE DEFICIENCY ANEMIA: ICD-10-CM

## 2024-10-04 DIAGNOSIS — D51.8 OTHER VITAMIN B12 DEFICIENCY ANEMIAS: ICD-10-CM

## 2024-10-04 DIAGNOSIS — Z13.1 SCREENING FOR DIABETES MELLITUS: Chronic | ICD-10-CM

## 2024-10-04 LAB
25(OH)D3 SERPL-MCNC: 63.9 NG/ML (ref 30–100)
ALBUMIN SERPL BCG-MCNC: 4.4 G/DL (ref 3.5–5)
ALP SERPL-CCNC: 59 U/L (ref 34–104)
ALT SERPL W P-5'-P-CCNC: 17 U/L (ref 7–52)
ANION GAP SERPL CALCULATED.3IONS-SCNC: 10 MMOL/L (ref 4–13)
AST SERPL W P-5'-P-CCNC: 24 U/L (ref 13–39)
BASOPHILS # BLD AUTO: 0.03 THOUSANDS/ΜL (ref 0–0.1)
BASOPHILS NFR BLD AUTO: 0 % (ref 0–1)
BILIRUB SERPL-MCNC: 0.53 MG/DL (ref 0.2–1)
BUN SERPL-MCNC: 29 MG/DL (ref 5–25)
CALCIUM SERPL-MCNC: 9.7 MG/DL (ref 8.4–10.2)
CHLORIDE SERPL-SCNC: 103 MMOL/L (ref 96–108)
CHOLEST SERPL-MCNC: 140 MG/DL
CO2 SERPL-SCNC: 28 MMOL/L (ref 21–32)
CREAT SERPL-MCNC: 1.26 MG/DL (ref 0.6–1.3)
EOSINOPHIL # BLD AUTO: 0.24 THOUSAND/ΜL (ref 0–0.61)
EOSINOPHIL NFR BLD AUTO: 3 % (ref 0–6)
ERYTHROCYTE [DISTWIDTH] IN BLOOD BY AUTOMATED COUNT: 14.4 % (ref 11.6–15.1)
EST. AVERAGE GLUCOSE BLD GHB EST-MCNC: 123 MG/DL
FERRITIN SERPL-MCNC: 11 NG/ML (ref 11–307)
FOLATE SERPL-MCNC: >22.3 NG/ML
GFR SERPL CREATININE-BSD FRML MDRD: 41 ML/MIN/1.73SQ M
GLUCOSE P FAST SERPL-MCNC: 96 MG/DL (ref 65–99)
HBA1C MFR BLD: 5.9 %
HCT VFR BLD AUTO: 39.7 % (ref 34.8–46.1)
HDLC SERPL-MCNC: 65 MG/DL
HGB BLD-MCNC: 12.4 G/DL (ref 11.5–15.4)
IMM GRANULOCYTES # BLD AUTO: 0.03 THOUSAND/UL (ref 0–0.2)
IMM GRANULOCYTES NFR BLD AUTO: 0 % (ref 0–2)
IRON SATN MFR SERPL: 17 % (ref 15–50)
IRON SERPL-MCNC: 70 UG/DL (ref 50–212)
LDLC SERPL CALC-MCNC: 57 MG/DL (ref 0–100)
LYMPHOCYTES # BLD AUTO: 3.27 THOUSANDS/ΜL (ref 0.6–4.47)
LYMPHOCYTES NFR BLD AUTO: 45 % (ref 14–44)
MCH RBC QN AUTO: 30.8 PG (ref 26.8–34.3)
MCHC RBC AUTO-ENTMCNC: 31.2 G/DL (ref 31.4–37.4)
MCV RBC AUTO: 99 FL (ref 82–98)
MONOCYTES # BLD AUTO: 0.73 THOUSAND/ΜL (ref 0.17–1.22)
MONOCYTES NFR BLD AUTO: 10 % (ref 4–12)
NEUTROPHILS # BLD AUTO: 3.09 THOUSANDS/ΜL (ref 1.85–7.62)
NEUTS SEG NFR BLD AUTO: 42 % (ref 43–75)
NONHDLC SERPL-MCNC: 75 MG/DL
NRBC BLD AUTO-RTO: 0 /100 WBCS
PLATELET # BLD AUTO: 339 THOUSANDS/UL (ref 149–390)
PMV BLD AUTO: 10.5 FL (ref 8.9–12.7)
POTASSIUM SERPL-SCNC: 3.9 MMOL/L (ref 3.5–5.3)
PROT SERPL-MCNC: 7.5 G/DL (ref 6.4–8.4)
RBC # BLD AUTO: 4.03 MILLION/UL (ref 3.81–5.12)
SODIUM SERPL-SCNC: 141 MMOL/L (ref 135–147)
TIBC SERPL-MCNC: 401 UG/DL (ref 250–450)
TRIGL SERPL-MCNC: 92 MG/DL
TSH SERPL DL<=0.05 MIU/L-ACNC: 4.35 UIU/ML (ref 0.45–4.5)
UIBC SERPL-MCNC: 331 UG/DL (ref 155–355)
VIT B12 SERPL-MCNC: 903 PG/ML (ref 180–914)
WBC # BLD AUTO: 7.39 THOUSAND/UL (ref 4.31–10.16)

## 2024-10-04 PROCEDURE — 82746 ASSAY OF FOLIC ACID SERUM: CPT

## 2024-10-04 PROCEDURE — 83036 HEMOGLOBIN GLYCOSYLATED A1C: CPT

## 2024-10-04 PROCEDURE — 82728 ASSAY OF FERRITIN: CPT

## 2024-10-04 PROCEDURE — 84443 ASSAY THYROID STIM HORMONE: CPT

## 2024-10-04 PROCEDURE — 80061 LIPID PANEL: CPT

## 2024-10-04 PROCEDURE — 36415 COLL VENOUS BLD VENIPUNCTURE: CPT

## 2024-10-04 PROCEDURE — 83550 IRON BINDING TEST: CPT

## 2024-10-04 PROCEDURE — 83540 ASSAY OF IRON: CPT

## 2024-10-04 PROCEDURE — 82306 VITAMIN D 25 HYDROXY: CPT

## 2024-10-04 PROCEDURE — 80053 COMPREHEN METABOLIC PANEL: CPT

## 2024-10-04 PROCEDURE — 85025 COMPLETE CBC W/AUTO DIFF WBC: CPT

## 2024-10-04 PROCEDURE — 82607 VITAMIN B-12: CPT

## 2024-10-13 PROBLEM — N18.32 STAGE 3B CHRONIC KIDNEY DISEASE (HCC): Chronic | Status: ACTIVE | Noted: 2023-09-27

## 2024-10-13 PROBLEM — M85.89 OSTEOPENIA OF MULTIPLE SITES: Chronic | Status: ACTIVE | Noted: 2024-10-13

## 2024-10-13 PROBLEM — M85.89 OSTEOPENIA OF MULTIPLE SITES: Status: ACTIVE | Noted: 2024-10-13

## 2024-10-13 PROBLEM — R73.03 PREDIABETES: Status: ACTIVE | Noted: 2024-10-03

## 2024-10-13 PROBLEM — Z76.89 ESTABLISHING CARE WITH NEW DOCTOR, ENCOUNTER FOR: Status: ACTIVE | Noted: 2024-10-13

## 2024-10-13 PROBLEM — Z12.11 SCREEN FOR COLON CANCER: Chronic | Status: RESOLVED | Noted: 2018-09-25 | Resolved: 2019-12-18

## 2024-10-13 PROBLEM — N18.32 STAGE 3B CHRONIC KIDNEY DISEASE (HCC): Status: ACTIVE | Noted: 2023-09-27

## 2024-10-13 PROBLEM — Z12.11 SCREEN FOR COLON CANCER: Chronic | Status: ACTIVE | Noted: 2018-09-25

## 2024-10-13 PROBLEM — E78.2 MIXED HYPERLIPIDEMIA: Status: ACTIVE | Noted: 2024-10-03

## 2024-10-13 PROBLEM — Z98.890 HISTORY OF COLONOSCOPY: Status: ACTIVE | Noted: 2024-10-13

## 2024-10-13 PROBLEM — Z23 ENCOUNTER FOR IMMUNIZATION: Status: ACTIVE | Noted: 2024-10-13

## 2024-10-13 PROBLEM — E78.01 FAMILIAL HYPERCHOLESTEROLEMIA: Status: RESOLVED | Noted: 2018-05-22 | Resolved: 2024-10-13

## 2024-10-13 PROBLEM — E78.2 MIXED HYPERLIPIDEMIA: Chronic | Status: ACTIVE | Noted: 2024-10-03

## 2024-10-13 PROBLEM — R73.03 PREDIABETES: Chronic | Status: ACTIVE | Noted: 2024-10-03

## 2024-10-13 PROBLEM — Z98.890 HISTORY OF COLONOSCOPY: Chronic | Status: ACTIVE | Noted: 2024-10-13

## 2024-10-13 NOTE — ASSESSMENT & PLAN NOTE
Component  Ref Range & Units 10/4/24  9:17 AM   Hemoglobin A1C  Normal 4.0-5.6%; PreDiabetic 5.7-6.4%; Diabetic >=6.5%; Glycemic control for adults with diabetes <7.0% % 5.9 High    EAG  mg/dl 123     Discussed diet and exercise  Discussed decreasing her carbohydrate and her sugar intake  Will monitor labs    Orders:    Hemoglobin A1C; Future

## 2024-10-13 NOTE — PATIENT INSTRUCTIONS
_________________________________________________________________  YOU ARE DUE FOR YOUR MEDICARE ANNUAL WELLNESS PHYSICAL EXAM AFTER 3/27/2025.  REPEAT LABS ORDERED, PLEASE HAVE THEM DRAWN THE WEEK PRIOR TO YOUR VISIT (APPROXIMATELY 3/20/2025).  LABS HAVE BEEN ORDERED FOR YOU.   THESE LABS SHOULD BE DRAWN PRIOR TO YOUR NEXT VISIT.  YOU CAN HAVE THEM DRAWN UP TO 1 WEEK PRIOR TO YOUR NEXT VISIT.  HAVING YOUR BLOOD WORK WHEN YOU COME TO YOUR NEXT VISIT WILL ALLOW ME TO PROVIDE THE BEST MEDICAL CARE FOR YOU WITHOUT HAVING EITHER OF US PERFORM MORE WORK THAN NECESSARY.  PLEASE BE COMPLIANT WITH THIS REQUEST.   _____________________________________________________________________

## 2024-10-13 NOTE — ASSESSMENT & PLAN NOTE
9/4/2024  FINDINGS:  Terminal ileum was normal.  One 3 mm polyp 40 cm from the anal verge; performed cold forceps biopsy with complete en bloc removal  Multiple medium, scattered diverticula of moderate severity in the descending colon and sigmoid colon  External small hemorrhoids observed during retroflexion  IMPRESSION:  Normal terminal ileum  3 mm sessile polyp at 40 cm status post excisional biopsy  Moderate diverticulosis descending sigmoid colon  Small external hemorrhoids   RECOMMENDATION:  Repeat colonoscopy in 5 years, due: 9/3/2029, provided you remain in excellent health at 79 years of age  Family history of colon cancer       -Please call the Gastroenterology office at [phone] for biopsy results if you do  not hear from our office in 14 days.  -Follow-up with primary care doctor as previously scheduled  -Please call gastroenterology physician on call or go to the emergency department if you develop abdominal pain, rectal bleeding greater than 1 tbsp, black stools, fever greater than 100.5, persistent nausea or vomiting.  Gastroenterology office phone number is [phone].   Suggest high-fiber diet, try obtain 25 to 30 g of fiber in diet daily.  Suggest the addition of a fiber supplement such as Benefiber 2 teaspoonfuls or Citrucel 1 heaping tablespoonful mixed in 6 to 8 ounce of non-carbonated liquid daily

## 2024-10-13 NOTE — ASSESSMENT & PLAN NOTE
This is a chronic disease process  We discussed diet and exercise  Continue  Crestor  Will monitor labs  Orders:    Lipid panel; Future

## 2024-10-13 NOTE — PROGRESS NOTES
Ambulatory Visit  Name: Talita Dumont      : 1950      MRN: [id number]  Encounter Provider: ANDRADE Kim  Encounter Date: 10/14/2024   Encounter department: [facility]    Assessment & Plan  Encounter for immunization    Orders:    influenza vaccine, high-dose, PF 0.5 mL (Fluzone High Dose)    History of colonoscopy  2024  FINDINGS:  Terminal ileum was normal.  One 3 mm polyp 40 cm from the anal verge; performed cold forceps biopsy with complete en bloc removal  Multiple medium, scattered diverticula of moderate severity in the descending colon and sigmoid colon  External small hemorrhoids observed during retroflexion  IMPRESSION:  Normal terminal ileum  3 mm sessile polyp at 40 cm status post excisional biopsy  Moderate diverticulosis descending sigmoid colon  Small external hemorrhoids   RECOMMENDATION:  Repeat colonoscopy in 5 years, due: 9/3/2029, provided you remain in excellent health at 79 years of age  Family history of colon cancer       -Please call the Gastroenterology office at [phone] for biopsy results if you do  not hear from our office in 14 days.  -Follow-up with primary care doctor as previously scheduled  -Please call gastroenterology physician on call or go to the emergency department if you develop abdominal pain, rectal bleeding greater than 1 tbsp, black stools, fever greater than 100.5, persistent nausea or vomiting.  Gastroenterology office phone number is [phone].   Suggest high-fiber diet, try obtain 25 to 30 g of fiber in diet daily.  Suggest the addition of a fiber supplement such as Benefiber 2 teaspoonfuls or Citrucel 1 heaping tablespoonful mixed in 6 to 8 ounce of non-carbonated liquid daily         Medicare annual wellness visit, subsequent    Orders:    Comprehensive metabolic panel; Future    CBC and differential; Future    Prediabetes  Component  Ref Range & Units 10/4/24  9:17 AM   Hemoglobin A1C  Normal 4.0-5.6%;  PreDiabetic 5.7-6.4%; Diabetic >=6.5%; Glycemic control for adults with diabetes <7.0% % 5.9 High    EAG  mg/dl 123     Discussed diet and exercise  Discussed decreasing her carbohydrate and her sugar intake  Will monitor labs    Orders:    Hemoglobin A1C; Future    Mixed hyperlipidemia  This is a chronic disease process  We discussed diet and exercise  Continue  Crestor  Will monitor labs  Orders:    Lipid panel; Future    Screening for iron deficiency anemia  This is a chronic disease process  Continue vitamin D supplement  Will monitor labs  Orders:    Iron Panel (Includes Ferritin, Iron Sat%, Iron, and TIBC); Future    Folate; Future    Vitamin B12; Future    Vitamin D deficiency  Continue supplement  Monitor labs  Orders:    Vitamin D 25 hydroxy; Future    Primary insomnia  This is a chronic disease process  Continue Ambien  Orders:    zolpidem (AMBIEN) 10 mg tablet; TAKE 1 TABLET BY MOUTH  EVERY NIGHT AT BEDTIME AS  NEEDED FOR INSOMNIA    Stage 3b chronic kidney disease (HCC)  Lab Results   Component Value Date    EGFR 41 10/04/2024    EGFR 48 09/07/2023    EGFR 46 08/22/2022    CREATININE 1.26 10/04/2024    CREATININE 1.12 09/07/2023    CREATININE 1.16 08/22/2022        Latest Reference Range & Units 02/11/19 13:26 06/18/20 09:39 07/12/21 09:46 08/22/22 09:50 09/07/23 07:52 10/04/24 09:17   BUN 5 - 25 mg/dL 24 25 27 (H) 29 (H) 24 29 (H)   Creatinine 0.60 - 1.30 mg/dL 1.20 1.03 1.13 1.16 1.12 1.26   (H): Data is abnormally high     Latest Reference Range & Units 02/11/19 13:26 06/18/20 09:39 07/12/21 09:46 08/22/22 09:50 09/07/23 07:52 10/04/24 09:17   GFR, Calculated ml/min/1.73sq m 46 55 49 46 48 41         Stage 1 with normal or high GFR (GFR > 90 mL/min/1.73 square meters)     Stage 2 Mild CKD (GFR = 60-89 mL/min/1.73 square meters)     Stage 3A Moderate CKD (GFR = 45-59 mL/min/1.73 square meters)     Stage 3B Moderate CKD (GFR = 30-44 mL/min/1.73 square meters)     Stage 4 Severe CKD (GFR = 15-29  mL/min/1.73 square meters)         10/14/2024  Discussed nephrotoxic medications  We will monitor labs         Subclinical hypothyroidism  Chronic and stable disease process  We will monitor labs  No need for occasion at this time  Orders:    TSH, 3rd generation with Free T4 reflex; Future    Essential hypertension  This is a chronic and stable disease process  Discussed diet and exercise  We discussed low-salt diet  Continue  Amlodipine-benazepril  Orders:    amLODIPine-benazepril (LOTREL) 10-40 MG per capsule; Take 1 capsule by mouth daily    Varicose veins of both legs with edema         Osteopenia of multiple sites  7/11/2024  RESULTS:   LUMBAR SPINE  Level: L1, L2, L4  (L3 vertebra excluded from analysis due to local structural abnormalities or artifact):  BMD: 1.288 gm/cm2  T-score: 0.8   LEFT TOTAL HIP:  BMD: 0.992 gm/cm2  T-score: -0.1   LEFT FEMORAL NECK:  BMD: 0.934 gm/cm2  T score: -0.7   RIGHT TOTAL HIP:  BMD: 0.909 gm/cm2  T-score: -0.8   RIGHT FEMORAL NECK:  BMD: 0.888 gm/cm2  T score: -1.1   IMPRESSION:  1. Low bone mass (osteopenia). Based on the right femoral neck   2.  Since a DXA study from 11/29/2021, there has been:  A  STATISTICALLY SIGNIFICANT DECREASE in bone mineral density of 0.033 g/cm2 (3.4%) in the hips.    Osteopenia  1200mg Calcium  1000units Vitamin D  Weightbearing and muscle strengthening exercises  Fall prevention  Avoidance of tobacco  Avoidance of excessive alcohol intake    Discussed diet and exercise  Continue  Calcium supplements  Vitamin D supplements           Establishing care with new doctor, encounter for         Greater trochanteric bursitis of right hip  Referral to sports medicine for right hip injections     Orders:    Ambulatory Referral to Orthopedic Surgery; Future    Dietary folate deficiency anemia    Orders:    Folate; Future    Other dietary vitamin B12 deficiency anemia    Orders:    Vitamin B12; Future    Foot pain, bilateral    Orders:    diclofenac sodium  (VOLTAREN) 50 mg EC tablet; Take 1 tablet (50 mg total) by mouth 2 (two) times a day    Familial hypercholesterolemia    Orders:    rosuvastatin (CRESTOR) 10 MG tablet; Take 1 tablet (10 mg total) by mouth daily       History of Present Illness     The patient is here today to establish with this provider, we discussed her medications and treatment plans.   I reviewed their medical conditions, medications, laboratory and radiology studies.  I reviewed their scheduled future lab studies with the patient.   The patient's current treatment plan is effective.   There is no change in the current therapy.   I ask the patient to continue their current therapy.   The patient voiced their understanding and agreement.   Follow up after 3/27/2025 for her Medicare annual wellness physical.  Please continue to the POR[facility] section of the note for details of today's visit.               History obtained from : patient  Review of Systems   Constitutional:  Negative for activity change, chills, fatigue and fever.   HENT:  Negative for rhinorrhea and sore throat.    Eyes:  Negative for pain.   Respiratory:  Negative for cough and shortness of breath.    Cardiovascular:  Negative for chest pain, palpitations and leg swelling.   Gastrointestinal:  Negative for abdominal pain, constipation, diarrhea, nausea and vomiting.   Genitourinary:  Negative for difficulty urinating, flank pain, frequency and urgency.   Musculoskeletal:  Positive for arthralgias and myalgias. Negative for gait problem and joint swelling.   Skin:  Negative for color change.   Neurological:  Negative for dizziness, weakness, light-headedness and headaches.   Psychiatric/Behavioral:  Negative for sleep disturbance. The patient is not nervous/anxious.    All other systems reviewed and are negative.    Current Outpatient Medications on File Prior to Visit   Medication Sig Dispense Refill    ammonium lactate (LAC-HYDRIN) 12 % lotion APPLY TO DRY SKIN TWICE DAILY. RUB IN  "THOROUGHLY. AVOID OPEN SKIN      Calcium Carbonate-Vitamin D 600-200 MG-UNIT CAPS Take 1 capsule by mouth daily      cholecalciferol (VITAMIN D3) 1,000 units tablet Take 1,000 Units by mouth daily      COLLAGEN PO Take by mouth      cyanocobalamin (VITAMIN B-12) 500 mcg tablet Take 500 mcg by mouth daily      Diclofenac Sodium (VOLTAREN) 1 % Apply 2 g topically 4 (four) times a day 350 g 2    nystatin (MYCOSTATIN) cream Apply topically 2 (two) times a day 15 g 0    Prolensa 0.07 % SOLN       Wheat Dextrin (Benefiber) POWD Take by mouth       No current facility-administered medications on file prior to visit.          Objective     /88 (BP Location: Left arm, Patient Position: Sitting, Cuff Size: Standard)   Pulse 89   Temp 97.6 °F (36.4 °C) (Tympanic)   Ht 5' 5\" (1.651 m)   Wt 75.8 kg (167 lb)   SpO2 99%   BMI 27.79 kg/m²     Physical Exam  Vitals and nursing note reviewed.   Constitutional:       General: She is awake. She is not in acute distress.     Appearance: Normal appearance. She is well-developed.   HENT:      Head: Normocephalic and atraumatic.      Nose: Nose normal.      Mouth/Throat:      Mouth: Mucous membranes are moist.   Eyes:      Conjunctiva/sclera: Conjunctivae normal.   Cardiovascular:      Rate and Rhythm: Normal rate and regular rhythm.      Pulses: Normal pulses.      Heart sounds: Normal heart sounds. No murmur heard.  Pulmonary:      Effort: Pulmonary effort is normal. No respiratory distress.      Breath sounds: Normal breath sounds.   Abdominal:      General: Bowel sounds are normal.      Palpations: Abdomen is soft.      Tenderness: There is no abdominal tenderness.   Musculoskeletal:      Cervical back: Neck supple.      Right lower leg: No edema.      Left lower leg: No edema.      Comments: Chronic aches and pains   Skin:     General: Skin is warm and dry.   Neurological:      Mental Status: She is alert and oriented to person, place, and time.   Psychiatric:         " Attention and Perception: Attention normal.         Mood and Affect: Mood normal.         Speech: Speech normal.         Behavior: Behavior normal. Behavior is cooperative.         Thought Content: Thought content normal.         Cognition and Memory: Cognition normal.         Judgment: Judgment normal.       Administrative Statements   I have spent a total time of 35 minutes in caring for this patient on the day of the visit/encounter including Diagnostic results, Prognosis, Risks and benefits of tx options, Instructions for management, Patient and family education, Importance of tx compliance, Risk factor reductions, Impressions, Counseling / Coordination of care, Documenting in the medical record, Reviewing / ordering tests, medicine, procedures  , and Obtaining or reviewing history  .

## 2024-10-13 NOTE — ASSESSMENT & PLAN NOTE
This is a chronic and stable disease process  Discussed diet and exercise  We discussed low-salt diet  Continue  Amlodipine-benazepril  Orders:    amLODIPine-benazepril (LOTREL) 10-40 MG per capsule; Take 1 capsule by mouth daily

## 2024-10-13 NOTE — ASSESSMENT & PLAN NOTE
Lab Results   Component Value Date    EGFR 41 10/04/2024    EGFR 48 09/07/2023    EGFR 46 08/22/2022    CREATININE 1.26 10/04/2024    CREATININE 1.12 09/07/2023    CREATININE 1.16 08/22/2022        Latest Reference Range & Units 02/11/19 13:26 06/18/20 09:39 07/12/21 09:46 08/22/22 09:50 09/07/23 07:52 10/04/24 09:17   BUN 5 - 25 mg/dL 24 25 27 (H) 29 (H) 24 29 (H)   Creatinine 0.60 - 1.30 mg/dL 1.20 1.03 1.13 1.16 1.12 1.26   (H): Data is abnormally high     Latest Reference Range & Units 02/11/19 13:26 06/18/20 09:39 07/12/21 09:46 08/22/22 09:50 09/07/23 07:52 10/04/24 09:17   GFR, Calculated ml/min/1.73sq m 46 55 49 46 48 41         Stage 1 with normal or high GFR (GFR > 90 mL/min/1.73 square meters)     Stage 2 Mild CKD (GFR = 60-89 mL/min/1.73 square meters)     Stage 3A Moderate CKD (GFR = 45-59 mL/min/1.73 square meters)     Stage 3B Moderate CKD (GFR = 30-44 mL/min/1.73 square meters)     Stage 4 Severe CKD (GFR = 15-29 mL/min/1.73 square meters)         10/14/2024  Discussed nephrotoxic medications  We will monitor labs

## 2024-10-13 NOTE — ASSESSMENT & PLAN NOTE
This is a chronic disease process  Continue Ambien  Orders:    zolpidem (AMBIEN) 10 mg tablet; TAKE 1 TABLET BY MOUTH  EVERY NIGHT AT BEDTIME AS  NEEDED FOR INSOMNIA

## 2024-10-13 NOTE — ASSESSMENT & PLAN NOTE
This is a chronic disease process  Continue vitamin D supplement  Will monitor labs  Orders:    Iron Panel (Includes Ferritin, Iron Sat%, Iron, and TIBC); Future    Folate; Future    Vitamin B12; Future

## 2024-10-13 NOTE — ASSESSMENT & PLAN NOTE
7/11/2024  RESULTS:   LUMBAR SPINE  Level: L1, L2, L4  (L3 vertebra excluded from analysis due to local structural abnormalities or artifact):  BMD: 1.288 gm/cm2  T-score: 0.8   LEFT TOTAL HIP:  BMD: 0.992 gm/cm2  T-score: -0.1   LEFT FEMORAL NECK:  BMD: 0.934 gm/cm2  T score: -0.7   RIGHT TOTAL HIP:  BMD: 0.909 gm/cm2  T-score: -0.8   RIGHT FEMORAL NECK:  BMD: 0.888 gm/cm2  T score: -1.1   IMPRESSION:  1. Low bone mass (osteopenia). Based on the right femoral neck   2.  Since a DXA study from 11/29/2021, there has been:  A  STATISTICALLY SIGNIFICANT DECREASE in bone mineral density of 0.033 g/cm2 (3.4%) in the hips.    Osteopenia  1200mg Calcium  1000units Vitamin D  Weightbearing and muscle strengthening exercises  Fall prevention  Avoidance of tobacco  Avoidance of excessive alcohol intake    Discussed diet and exercise  Continue  Calcium supplements  Vitamin D supplements

## 2024-10-13 NOTE — ASSESSMENT & PLAN NOTE
Chronic and stable disease process  We will monitor labs  No need for occasion at this time  Orders:    TSH, 3rd generation with Free T4 reflex; Future

## 2024-10-14 ENCOUNTER — OFFICE VISIT (OUTPATIENT)
Dept: FAMILY MEDICINE CLINIC | Facility: CLINIC | Age: 74
End: 2024-10-14
Payer: MEDICARE

## 2024-10-14 VITALS
OXYGEN SATURATION: 99 % | SYSTOLIC BLOOD PRESSURE: 130 MMHG | HEART RATE: 89 BPM | BODY MASS INDEX: 27.82 KG/M2 | WEIGHT: 167 LBS | TEMPERATURE: 97.6 F | DIASTOLIC BLOOD PRESSURE: 88 MMHG | HEIGHT: 65 IN

## 2024-10-14 DIAGNOSIS — E78.01 FAMILIAL HYPERCHOLESTEROLEMIA: ICD-10-CM

## 2024-10-14 DIAGNOSIS — I83.893 VARICOSE VEINS OF BOTH LEGS WITH EDEMA: Chronic | ICD-10-CM

## 2024-10-14 DIAGNOSIS — I10 ESSENTIAL HYPERTENSION: Chronic | ICD-10-CM

## 2024-10-14 DIAGNOSIS — E55.9 VITAMIN D DEFICIENCY: Chronic | ICD-10-CM

## 2024-10-14 DIAGNOSIS — F51.01 PRIMARY INSOMNIA: Chronic | ICD-10-CM

## 2024-10-14 DIAGNOSIS — D52.0 DIETARY FOLATE DEFICIENCY ANEMIA: ICD-10-CM

## 2024-10-14 DIAGNOSIS — D51.3 OTHER DIETARY VITAMIN B12 DEFICIENCY ANEMIA: ICD-10-CM

## 2024-10-14 DIAGNOSIS — R73.03 PREDIABETES: ICD-10-CM

## 2024-10-14 DIAGNOSIS — Z23 ENCOUNTER FOR IMMUNIZATION: ICD-10-CM

## 2024-10-14 DIAGNOSIS — M79.672 FOOT PAIN, BILATERAL: ICD-10-CM

## 2024-10-14 DIAGNOSIS — M85.89 OSTEOPENIA OF MULTIPLE SITES: ICD-10-CM

## 2024-10-14 DIAGNOSIS — E78.2 MIXED HYPERLIPIDEMIA: ICD-10-CM

## 2024-10-14 DIAGNOSIS — Z98.890 HISTORY OF COLONOSCOPY: ICD-10-CM

## 2024-10-14 DIAGNOSIS — M70.61 GREATER TROCHANTERIC BURSITIS OF RIGHT HIP: Chronic | ICD-10-CM

## 2024-10-14 DIAGNOSIS — Z76.89 ESTABLISHING CARE WITH NEW DOCTOR, ENCOUNTER FOR: Primary | ICD-10-CM

## 2024-10-14 DIAGNOSIS — Z13.0 SCREENING FOR IRON DEFICIENCY ANEMIA: Chronic | ICD-10-CM

## 2024-10-14 DIAGNOSIS — E03.8 SUBCLINICAL HYPOTHYROIDISM: Chronic | ICD-10-CM

## 2024-10-14 DIAGNOSIS — Z00.00 MEDICARE ANNUAL WELLNESS VISIT, SUBSEQUENT: Chronic | ICD-10-CM

## 2024-10-14 DIAGNOSIS — N18.32 STAGE 3B CHRONIC KIDNEY DISEASE (HCC): ICD-10-CM

## 2024-10-14 DIAGNOSIS — M79.671 FOOT PAIN, BILATERAL: ICD-10-CM

## 2024-10-14 PROCEDURE — G0439 PPPS, SUBSEQ VISIT: HCPCS | Performed by: NURSE PRACTITIONER

## 2024-10-14 RX ORDER — ZOLPIDEM TARTRATE 10 MG/1
TABLET ORAL
Qty: 90 TABLET | Refills: 0 | Status: SHIPPED | OUTPATIENT
Start: 2024-10-14

## 2024-10-14 RX ORDER — AMLODIPINE AND BENAZEPRIL HYDROCHLORIDE 10; 40 MG/1; MG/1
1 CAPSULE ORAL DAILY
Qty: 100 CAPSULE | Refills: 3 | Status: SHIPPED | OUTPATIENT
Start: 2024-10-14

## 2024-10-14 RX ORDER — ROSUVASTATIN CALCIUM 10 MG/1
10 TABLET, COATED ORAL DAILY
Qty: 90 TABLET | Refills: 3 | Status: SHIPPED | OUTPATIENT
Start: 2024-10-14

## 2024-10-14 NOTE — ASSESSMENT & PLAN NOTE
Referral to sports medicine for right hip injections     Orders:    Ambulatory Referral to Orthopedic Surgery; Future

## 2024-10-16 ENCOUNTER — OFFICE VISIT (OUTPATIENT)
Dept: OBGYN CLINIC | Facility: CLINIC | Age: 74
End: 2024-10-16
Payer: MEDICARE

## 2024-10-16 ENCOUNTER — APPOINTMENT (OUTPATIENT)
Dept: RADIOLOGY | Facility: CLINIC | Age: 74
End: 2024-10-16
Payer: MEDICARE

## 2024-10-16 VITALS
WEIGHT: 167 LBS | HEART RATE: 80 BPM | SYSTOLIC BLOOD PRESSURE: 141 MMHG | HEIGHT: 65 IN | DIASTOLIC BLOOD PRESSURE: 91 MMHG | BODY MASS INDEX: 27.82 KG/M2

## 2024-10-16 DIAGNOSIS — M25.551 PAIN IN RIGHT HIP: Primary | ICD-10-CM

## 2024-10-16 DIAGNOSIS — M70.61 GREATER TROCHANTERIC BURSITIS OF RIGHT HIP: Chronic | ICD-10-CM

## 2024-10-16 DIAGNOSIS — M25.551 PAIN IN RIGHT HIP: ICD-10-CM

## 2024-10-16 PROCEDURE — 73502 X-RAY EXAM HIP UNI 2-3 VIEWS: CPT

## 2024-10-16 PROCEDURE — 20610 DRAIN/INJ JOINT/BURSA W/O US: CPT | Performed by: STUDENT IN AN ORGANIZED HEALTH CARE EDUCATION/TRAINING PROGRAM

## 2024-10-16 PROCEDURE — 99204 OFFICE O/P NEW MOD 45 MIN: CPT | Performed by: STUDENT IN AN ORGANIZED HEALTH CARE EDUCATION/TRAINING PROGRAM

## 2024-10-16 RX ORDER — METHYLPREDNISOLONE ACETATE 40 MG/ML
1 INJECTION, SUSPENSION INTRA-ARTICULAR; INTRALESIONAL; INTRAMUSCULAR; SOFT TISSUE
Status: COMPLETED | OUTPATIENT
Start: 2024-10-16 | End: 2024-10-16

## 2024-10-16 RX ORDER — BUPIVACAINE HYDROCHLORIDE 2.5 MG/ML
4 INJECTION, SOLUTION INFILTRATION; PERINEURAL
Status: COMPLETED | OUTPATIENT
Start: 2024-10-16 | End: 2024-10-16

## 2024-10-16 RX ORDER — LIDOCAINE HYDROCHLORIDE 20 MG/ML
4 INJECTION, SOLUTION INFILTRATION; PERINEURAL
Status: COMPLETED | OUTPATIENT
Start: 2024-10-16 | End: 2024-10-16

## 2024-10-16 RX ADMIN — LIDOCAINE HYDROCHLORIDE 4 ML: 20 INJECTION, SOLUTION INFILTRATION; PERINEURAL at 13:30

## 2024-10-16 RX ADMIN — BUPIVACAINE HYDROCHLORIDE 4 ML: 2.5 INJECTION, SOLUTION INFILTRATION; PERINEURAL at 13:30

## 2024-10-16 RX ADMIN — METHYLPREDNISOLONE ACETATE 1 ML: 40 INJECTION, SUSPENSION INTRA-ARTICULAR; INTRALESIONAL; INTRAMUSCULAR; SOFT TISSUE at 13:30

## 2024-10-16 NOTE — PROGRESS NOTES
Ortho Sports Medicine New Patient Hip Visit    Assesment:   74 y.o. female with right lateral hip pain and exam consistent with greater trochanteric bursitis    Plan:  Reviewed history, physical exam, and imaging with the patient at time of visit. Discussed with the patient that her imaging shows mild osteoarthritis of the hip. However, she experiences lateral pain of the right hip. The patient has tenderness over the greater trochanteric bursa. Discussed treatment options with the patient at time of visit including physical therapy and cortisone injections. The patient agreed to complete a home exercise program. Discussed the risk of injection including infection, flare reaction, or increase in blood glucose. Patient was still amenable to the injection. A cortisone injection was performed to the right greater trochanteric bursa. The patient tolerated procedure well, with no adverse reaction. She will follow-up on an as needed basis. The patient demonstrated understanding of the discussion and was in agreement with the plan. All of the questions were answered. Patient can reach out to clinic with any questions or concerns at any time.           Follow up:  Return if symptoms worsen or fail to improve.        Chief Complaint   Patient presents with    Right Hip - Pain       History of Present Illness:  The patient is a 74 y.o. female seen in clinic for right hip pain. The patient reports lateral sided hip pain that began several days ago. She states that she has a previous history of greater trochanteric bursitis which was treated with injections. The patient states that she has previously completed physical therapy and home exercise programs, however, the injections worked best. The patient reports lateral hip pain. 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 However, she rolled onto her hip the other night and experienced significant pain. She has point tenderness, pain with side  lying, and pain with weightbearing. She denies back pain. She denies numbness or tingling.       The patient has the following co-morbidities: Stage 3b kidney disease       Hip Surgical History:  None    Past Medical, Social and Family History:  Past Medical History:   Diagnosis Date    Arthritis     Cataract     starting    Hallux valgus of right foot     Hypertension     RLL pneumonia     Last Assessed:11/4/14    Skin rash 11/30/2016    Wears glasses      Past Surgical History:   Procedure Laterality Date    BLADDER SUSPENSION      COLONOSCOPY      EYE SURGERY Right     Retinal Detachment complex    MD CORRJ HLX VLGS BNCTY SESMDC DSTL METAR OSTEOT Right 09/29/2020    Procedure: BUNIONECTOMY JORGITO;  Surgeon: Diane Corbin DPM;  Location: [facility] OR;  Service: Podiatry    TUBAL LIGATION  1983    WISDOM TOOTH EXTRACTION       No Known Allergies  Current Outpatient Medications on File Prior to Visit   Medication Sig Dispense Refill    amLODIPine-benazepril (LOTREL) 10-40 MG per capsule Take 1 capsule by mouth daily 100 capsule 3    ammonium lactate (LAC-HYDRIN) 12 % lotion APPLY TO DRY SKIN TWICE DAILY. RUB IN THOROUGHLY. AVOID OPEN SKIN      Calcium Carbonate-Vitamin D 600-200 MG-UNIT CAPS Take 1 capsule by mouth daily      cholecalciferol (VITAMIN D3) 1,000 units tablet Take 1,000 Units by mouth daily      COLLAGEN PO Take by mouth      cyanocobalamin (VITAMIN B-12) 500 mcg tablet Take 500 mcg by mouth daily      Diclofenac Sodium (VOLTAREN) 1 % Apply 2 g topically 4 (four) times a day 350 g 2    diclofenac sodium (VOLTAREN) 50 mg EC tablet Take 1 tablet (50 mg total) by mouth 2 (two) times a day 180 tablet 1    nystatin (MYCOSTATIN) cream Apply topically 2 (two) times a day 15 g 0    Prolensa 0.07 % SOLN       rosuvastatin (CRESTOR) 10 MG tablet Take 1 tablet (10 mg total) by mouth daily 90 tablet 3    Wheat Dextrin (Benefiber) POWD Take by mouth      zolpidem (AMBIEN) 10 mg tablet TAKE 1 TABLET BY MOUTH  EVERY  NIGHT AT BEDTIME AS  NEEDED FOR INSOMNIA 90 tablet 0     No current facility-administered medications on file prior to visit.     Social History     Socioeconomic History    Marital status: /Civil Union     Spouse name: Not on file    Number of children: Not on file    Years of education: Not on file    Highest education level: Not on file   Occupational History    Occupation: RETIRED   Tobacco Use    Smoking status: Never    Smokeless tobacco: Never   Vaping Use    Vaping status: Never Used   Substance and Sexual Activity    Alcohol use: Yes     Alcohol/week: 2.0 standard drinks of alcohol     Types: 1 Glasses of wine, 1 Cans of beer per week     Comment: 1 beer a week    Drug use: No    Sexual activity: Not Currently     Partners: Male   Other Topics Concern    Not on file   Social History Narrative        Retired-Teacher     Social Determinants of Health     Financial Resource Strain: Low Risk  (3/7/2023)    Overall Financial Resource Strain (CARDIA)     Difficulty of Paying Living Expenses: Not hard at all   Food Insecurity: No Food Insecurity (3/27/2024)    Hunger Vital Sign     Worried About Running Out of Food in the Last Year: Never true     Ran Out of Food in the Last Year: Never true   Transportation Needs: No Transportation Needs (3/27/2024)    PRAPARE - Transportation     Lack of Transportation (Medical): No     Lack of Transportation (Non-Medical): No   Physical Activity: Not on file   Stress: Not on file   Social Connections: Not on file   Intimate Partner Violence: Not on file   Housing Stability: Low Risk  (3/27/2024)    Housing Stability Vital Sign     Unable to Pay for Housing in the Last Year: No     Number of Times Moved in the Last Year: 1     Homeless in the Last Year: No         I have reviewed the past medical, surgical, social and family history, medications and allergies as documented in the EMR.    Review of systems: ROS is negative other than that noted in the  "HPI.  Constitutional: Negative for fatigue and fever.   HENT: Negative for sore throat.    Respiratory: Negative for shortness of breath.    Cardiovascular: Negative for chest pain.   Gastrointestinal: Negative for abdominal pain.   Endocrine: Negative for cold intolerance and heat intolerance.   Genitourinary: Negative for flank pain.   Musculoskeletal: Negative for back pain.   Skin: Negative for rash.   Allergic/Immunologic: Negative for immunocompromised state.   Neurological: Negative for dizziness.   Psychiatric/Behavioral: Negative for agitation.      Physical Exam:    Blood pressure 141/91, pulse 80, height 5' 5\" (1.651 m), weight 75.8 kg (167 lb), not currently breastfeeding.    General/Constitutional: NAD, well developed, well nourished  HENT: Normocephalic, atraumatic  CV: Intact distal pulses, regular rate  Resp: No respiratory distress or labored breathing  Neuro: Alert and Oriented x 3  Psych: Normal mood, normal affect, normal judgement, normal behavior  Skin: Warm, dry, no rashes, no erythema      Focused right Hip Exam:  No dislocation/deformity  ROM: Full  Greater troch:tender   SI joint: non-tender  Ramona test: negative  Raul's test:  negative  Abduction: 5/5  AT/GS intact    Back:    No TTP over lumbar spinous processes, paraspinal musculature  Negative SLR     No calf tenderness to palpation bilaterally    LE NV Exam: +2 DP/PT pulses bilaterally  Sensation intact to light touch L2-S1 bilaterally, SPN/DPN/TA motor intact    Hip Imaging:    X-rays of the right hip were obtained on 10/16/2024 and reviewed with the patient. Based on my independent evaluation, the imaging shows no acute osseous abnormalities or fractures. Mild osteoarthritis.     Large joint arthrocentesis: R greater trochanteric bursa  Universal Protocol:  Consent: Verbal consent obtained.  Consent given by: patient  Timeout called at: 10/16/2024 2:04 PM.  Patient understanding: patient states understanding of the procedure being " performed  Patient consent: the patient's understanding of the procedure matches consent given  Site marked: the operative site was marked  Radiology Images displayed and confirmed. If images not available, report reviewed: imaging studies available  Patient identity confirmed: verbally with patient  Supporting Documentation  Indications: pain   Procedure Details  Location: hip - R greater trochanteric bursa  Needle gauge: 21 G.  Ultrasound guidance: no  Approach: lateral  Medications administered: 1 mL methylPREDNISolone acetate 40 mg/mL; 4 mL lidocaine 2 %; 4 mL bupivacaine 0.25 %    Patient tolerance: patient tolerated the procedure well with no immediate complications  Dressing:  Sterile dressing applied            Scribe Attestation      I,:  Carl Key am acting as a scribe while in the presence of the attending physician.:       I,:  Mino Tong MD personally performed the services described in this documentation    as scribed in my presence.:

## 2024-10-17 NOTE — ASSESSMENT & PLAN NOTE
Orders:    diclofenac sodium (VOLTAREN) 50 mg EC tablet; Take 1 tablet (50 mg total) by mouth 2 (two) times a day

## 2025-03-21 ENCOUNTER — APPOINTMENT (OUTPATIENT)
Dept: LAB | Facility: CLINIC | Age: 75
End: 2025-03-21
Payer: MEDICARE

## 2025-03-21 DIAGNOSIS — Z00.00 MEDICARE ANNUAL WELLNESS VISIT, SUBSEQUENT: Chronic | ICD-10-CM

## 2025-03-21 DIAGNOSIS — E03.8 SUBCLINICAL HYPOTHYROIDISM: Chronic | ICD-10-CM

## 2025-03-21 DIAGNOSIS — D51.3 OTHER DIETARY VITAMIN B12 DEFICIENCY ANEMIA: ICD-10-CM

## 2025-03-21 DIAGNOSIS — D52.0 DIETARY FOLATE DEFICIENCY ANEMIA: ICD-10-CM

## 2025-03-21 DIAGNOSIS — E78.2 MIXED HYPERLIPIDEMIA: ICD-10-CM

## 2025-03-21 DIAGNOSIS — R73.03 PREDIABETES: ICD-10-CM

## 2025-03-21 DIAGNOSIS — Z13.0 SCREENING FOR IRON DEFICIENCY ANEMIA: ICD-10-CM

## 2025-03-21 DIAGNOSIS — E55.9 VITAMIN D DEFICIENCY: Chronic | ICD-10-CM

## 2025-03-21 LAB
25(OH)D3 SERPL-MCNC: 65.5 NG/ML (ref 30–100)
ALBUMIN SERPL BCG-MCNC: 4.5 G/DL (ref 3.5–5)
ALP SERPL-CCNC: 60 U/L (ref 34–104)
ALT SERPL W P-5'-P-CCNC: 16 U/L (ref 7–52)
ANION GAP SERPL CALCULATED.3IONS-SCNC: 8 MMOL/L (ref 4–13)
AST SERPL W P-5'-P-CCNC: 21 U/L (ref 13–39)
BASOPHILS # BLD AUTO: 0.04 THOUSANDS/ÂΜL (ref 0–0.1)
BASOPHILS NFR BLD AUTO: 1 % (ref 0–1)
BILIRUB SERPL-MCNC: 0.42 MG/DL (ref 0.2–1)
BUN SERPL-MCNC: 26 MG/DL (ref 5–25)
CALCIUM SERPL-MCNC: 9.6 MG/DL (ref 8.4–10.2)
CHLORIDE SERPL-SCNC: 106 MMOL/L (ref 96–108)
CHOLEST SERPL-MCNC: 133 MG/DL (ref ?–200)
CO2 SERPL-SCNC: 28 MMOL/L (ref 21–32)
CREAT SERPL-MCNC: 1.13 MG/DL (ref 0.6–1.3)
EOSINOPHIL # BLD AUTO: 0.24 THOUSAND/ÂΜL (ref 0–0.61)
EOSINOPHIL NFR BLD AUTO: 3 % (ref 0–6)
ERYTHROCYTE [DISTWIDTH] IN BLOOD BY AUTOMATED COUNT: 15.4 % (ref 11.6–15.1)
EST. AVERAGE GLUCOSE BLD GHB EST-MCNC: 131 MG/DL
FERRITIN SERPL-MCNC: 7 NG/ML (ref 11–307)
FOLATE SERPL-MCNC: >22.3 NG/ML
GFR SERPL CREATININE-BSD FRML MDRD: 47 ML/MIN/1.73SQ M
GLUCOSE P FAST SERPL-MCNC: 115 MG/DL (ref 65–99)
HBA1C MFR BLD: 6.2 %
HCT VFR BLD AUTO: 41.3 % (ref 34.8–46.1)
HDLC SERPL-MCNC: 60 MG/DL
HGB BLD-MCNC: 12.9 G/DL (ref 11.5–15.4)
IMM GRANULOCYTES # BLD AUTO: 0.02 THOUSAND/UL (ref 0–0.2)
IMM GRANULOCYTES NFR BLD AUTO: 0 % (ref 0–2)
IRON SATN MFR SERPL: 11 % (ref 15–50)
IRON SERPL-MCNC: 49 UG/DL (ref 50–212)
LDLC SERPL CALC-MCNC: 53 MG/DL (ref 0–100)
LYMPHOCYTES # BLD AUTO: 3.48 THOUSANDS/ÂΜL (ref 0.6–4.47)
LYMPHOCYTES NFR BLD AUTO: 47 % (ref 14–44)
MCH RBC QN AUTO: 29.6 PG (ref 26.8–34.3)
MCHC RBC AUTO-ENTMCNC: 31.2 G/DL (ref 31.4–37.4)
MCV RBC AUTO: 95 FL (ref 82–98)
MONOCYTES # BLD AUTO: 0.81 THOUSAND/ÂΜL (ref 0.17–1.22)
MONOCYTES NFR BLD AUTO: 11 % (ref 4–12)
NEUTROPHILS # BLD AUTO: 2.85 THOUSANDS/ÂΜL (ref 1.85–7.62)
NEUTS SEG NFR BLD AUTO: 38 % (ref 43–75)
NONHDLC SERPL-MCNC: 73 MG/DL
NRBC BLD AUTO-RTO: 0 /100 WBCS
PLATELET # BLD AUTO: 315 THOUSANDS/UL (ref 149–390)
PMV BLD AUTO: 10.3 FL (ref 8.9–12.7)
POTASSIUM SERPL-SCNC: 4.1 MMOL/L (ref 3.5–5.3)
PROT SERPL-MCNC: 7.1 G/DL (ref 6.4–8.4)
RBC # BLD AUTO: 4.36 MILLION/UL (ref 3.81–5.12)
SODIUM SERPL-SCNC: 142 MMOL/L (ref 135–147)
TIBC SERPL-MCNC: 462 UG/DL (ref 250–450)
TRANSFERRIN SERPL-MCNC: 330 MG/DL (ref 203–362)
TRIGL SERPL-MCNC: 101 MG/DL (ref ?–150)
TSH SERPL DL<=0.05 MIU/L-ACNC: 4.03 UIU/ML (ref 0.45–4.5)
UIBC SERPL-MCNC: 413 UG/DL (ref 155–355)
VIT B12 SERPL-MCNC: 837 PG/ML (ref 180–914)
WBC # BLD AUTO: 7.44 THOUSAND/UL (ref 4.31–10.16)

## 2025-03-21 PROCEDURE — 84443 ASSAY THYROID STIM HORMONE: CPT

## 2025-03-21 PROCEDURE — 82607 VITAMIN B-12: CPT

## 2025-03-21 PROCEDURE — 82746 ASSAY OF FOLIC ACID SERUM: CPT

## 2025-03-21 PROCEDURE — 83036 HEMOGLOBIN GLYCOSYLATED A1C: CPT

## 2025-03-21 PROCEDURE — 83540 ASSAY OF IRON: CPT

## 2025-03-21 PROCEDURE — 82728 ASSAY OF FERRITIN: CPT

## 2025-03-21 PROCEDURE — 82306 VITAMIN D 25 HYDROXY: CPT

## 2025-03-21 PROCEDURE — 85025 COMPLETE CBC W/AUTO DIFF WBC: CPT

## 2025-03-21 PROCEDURE — 36415 COLL VENOUS BLD VENIPUNCTURE: CPT

## 2025-03-21 PROCEDURE — 80061 LIPID PANEL: CPT

## 2025-03-21 PROCEDURE — 80053 COMPREHEN METABOLIC PANEL: CPT

## 2025-03-21 PROCEDURE — 83550 IRON BINDING TEST: CPT

## 2025-04-03 ENCOUNTER — OFFICE VISIT (OUTPATIENT)
Dept: FAMILY MEDICINE CLINIC | Facility: CLINIC | Age: 75
End: 2025-04-03
Payer: MEDICARE

## 2025-04-03 VITALS
DIASTOLIC BLOOD PRESSURE: 86 MMHG | TEMPERATURE: 97.5 F | RESPIRATION RATE: 16 BRPM | OXYGEN SATURATION: 96 % | HEART RATE: 73 BPM | BODY MASS INDEX: 27.66 KG/M2 | HEIGHT: 65 IN | SYSTOLIC BLOOD PRESSURE: 132 MMHG | WEIGHT: 166 LBS

## 2025-04-03 DIAGNOSIS — E55.9 VITAMIN D DEFICIENCY: ICD-10-CM

## 2025-04-03 DIAGNOSIS — I10 ESSENTIAL HYPERTENSION: Chronic | ICD-10-CM

## 2025-04-03 DIAGNOSIS — R94.6 ABNORMAL RESULTS OF THYROID FUNCTION STUDIES: ICD-10-CM

## 2025-04-03 DIAGNOSIS — F51.01 PRIMARY INSOMNIA: Chronic | ICD-10-CM

## 2025-04-03 DIAGNOSIS — D63.1 ANEMIA DUE TO STAGE 3B CHRONIC KIDNEY DISEASE  (HCC): ICD-10-CM

## 2025-04-03 DIAGNOSIS — R73.03 PREDIABETES: Chronic | ICD-10-CM

## 2025-04-03 DIAGNOSIS — Z79.899 ON LONG TERM DRUG THERAPY: ICD-10-CM

## 2025-04-03 DIAGNOSIS — N18.32 STAGE 3B CHRONIC KIDNEY DISEASE (HCC): Chronic | ICD-10-CM

## 2025-04-03 DIAGNOSIS — R73.01 IMPAIRED FASTING GLUCOSE: ICD-10-CM

## 2025-04-03 DIAGNOSIS — Z00.00 MEDICARE ANNUAL WELLNESS VISIT, SUBSEQUENT: Primary | Chronic | ICD-10-CM

## 2025-04-03 DIAGNOSIS — E78.00 PURE HYPERCHOLESTEROLEMIA: ICD-10-CM

## 2025-04-03 DIAGNOSIS — D50.8 IRON DEFICIENCY ANEMIA SECONDARY TO INADEQUATE DIETARY IRON INTAKE: ICD-10-CM

## 2025-04-03 DIAGNOSIS — N18.32 ANEMIA DUE TO STAGE 3B CHRONIC KIDNEY DISEASE  (HCC): ICD-10-CM

## 2025-04-03 DIAGNOSIS — M70.61 GREATER TROCHANTERIC BURSITIS OF RIGHT HIP: Chronic | ICD-10-CM

## 2025-04-03 DIAGNOSIS — E03.8 SUBCLINICAL HYPOTHYROIDISM: Chronic | ICD-10-CM

## 2025-04-03 DIAGNOSIS — D52.8 OTHER FOLATE DEFICIENCY ANEMIAS: ICD-10-CM

## 2025-04-03 DIAGNOSIS — D51.8 VITAMIN B12 DEFICIENCY (DIETARY) ANEMIA: ICD-10-CM

## 2025-04-03 DIAGNOSIS — E03.9 ACQUIRED HYPOTHYROIDISM: ICD-10-CM

## 2025-04-03 DIAGNOSIS — M85.89 OSTEOPENIA OF MULTIPLE SITES: Chronic | ICD-10-CM

## 2025-04-03 PROCEDURE — G0439 PPPS, SUBSEQ VISIT: HCPCS | Performed by: NURSE PRACTITIONER

## 2025-04-03 NOTE — ASSESSMENT & PLAN NOTE
Component  Ref Range & Units (hover) 3/21/25  8:54 AM 10/4/24  9:17 AM 9/7/23  7:52 AM 8/22/22  9:50 AM 2/10/22  9:31 AM 7/12/21  9:46 AM 6/18/20  9:39 AM   TSH 3RD GENERATON 4.028 4.353 CM 4.098 CM 3.610 CM 3.850 High  R, CM 3.930 High  R, CM 3.460        Will recheck labs in 3 months-7/3/2025

## 2025-04-03 NOTE — ASSESSMENT & PLAN NOTE
Lab Results   Component Value Date    EGFR 47 03/21/2025    EGFR 41 10/04/2024    EGFR 48 09/07/2023    CREATININE 1.13 03/21/2025    CREATININE 1.26 10/04/2024    CREATININE 1.12 09/07/2023       Component  Ref Range & Units (hover) 3/21/25  8:54 AM 10/4/24  9:17 AM   Iron Saturation 11 Low  17   TIBC 462 High  401   Iron 49 Low  70        Okay to eat a tiny oranges daily to help increase her iron absorption from her food she already eats.   Will recheck labs in 3 months-7/3/2025

## 2025-04-03 NOTE — ASSESSMENT & PLAN NOTE
Orders:  •  Iron Panel (Includes Ferritin, Iron Sat%, Iron, and TIBC); Future  •  Iron Panel (Includes Ferritin, Iron Sat%, Iron, and TIBC); Future

## 2025-04-03 NOTE — PATIENT INSTRUCTIONS
Medicare Preventive Visit Patient Instructions  Thank you for completing your Welcome to Medicare Visit or Medicare Annual Wellness Visit today. Your next wellness visit will be due in one year (4/4/2026).  The screening/preventive services that you may require over the next 5-10 years are detailed below. Some tests may not apply to you based off risk factors and/or age. Screening tests ordered at today's visit but not completed yet may show as past due. Also, please note that scanned in results may not display below.  Preventive Screenings:  Service Recommendations Previous Testing/Comments   Colorectal Cancer Screening  * Colonoscopy    * Fecal Occult Blood Test (FOBT)/Fecal Immunochemical Test (FIT)  * Fecal DNA/Cologuard Test  * Flexible Sigmoidoscopy Age: 45-75 years old   Colonoscopy: every 10 years (may be performed more frequently if at higher risk)  OR  FOBT/FIT: every 1 year  OR  Cologuard: every 3 years  OR  Sigmoidoscopy: every 5 years  Screening may be recommended earlier than age 45 if at higher risk for colorectal cancer. Also, an individualized decision between you and your healthcare provider will decide whether screening between the ages of 76-85 would be appropriate. Colonoscopy: 09/04/2024  FOBT/FIT: Not on file  Cologuard: Not on file  Sigmoidoscopy: Not on file    Screening Current     Breast Cancer Screening Age: 40+ years old  Frequency: every 1-2 years  Not required if history of left and right mastectomy Mammogram: 07/11/2024    Screening Current   Cervical Cancer Screening Between the ages of 21-29, pap smear recommended once every 3 years.   Between the ages of 30-65, can perform pap smear with HPV co-testing every 5 years.   Recommendations may differ for women with a history of total hysterectomy, cervical cancer, or abnormal pap smears in past. Pap Smear: 04/17/2019    Screening Not Indicated   Hepatitis C Screening Once for adults born between 1945 and 1965  More frequently in  patients at high risk for Hepatitis C Hep C Antibody: 01/03/2017    Screening Current   Diabetes Screening 1-2 times per year if you're at risk for diabetes or have pre-diabetes Fasting glucose: 115 mg/dL (3/21/2025)  A1C: 6.2 % (3/21/2025)  Screening Current   Cholesterol Screening Once every 5 years if you don't have a lipid disorder. May order more often based on risk factors. Lipid panel: 03/21/2025    Screening Not Indicated  History Lipid Disorder     Other Preventive Screenings Covered by Medicare:  Abdominal Aortic Aneurysm (AAA) Screening: covered once if your at risk. You're considered to be at risk if you have a family history of AAA.  Lung Cancer Screening: covers low dose CT scan once per year if you meet all of the following conditions: (1) Age 55-77; (2) No signs or symptoms of lung cancer; (3) Current smoker or have quit smoking within the last 15 years; (4) You have a tobacco smoking history of at least 20 pack years (packs per day multiplied by number of years you smoked); (5) You get a written order from a healthcare provider.  Glaucoma Screening: covered annually if you're considered high risk: (1) You have diabetes OR (2) Family history of glaucoma OR (3)  aged 50 and older OR (4)  American aged 65 and older  Osteoporosis Screening: covered every 2 years if you meet one of the following conditions: (1) You're estrogen deficient and at risk for osteoporosis based off medical history and other findings; (2) Have a vertebral abnormality; (3) On glucocorticoid therapy for more than 3 months; (4) Have primary hyperparathyroidism; (5) On osteoporosis medications and need to assess response to drug therapy.   Last bone density test (DXA Scan): 07/11/2024.  HIV Screening: covered annually if you're between the age of 15-65. Also covered annually if you are younger than 15 and older than 65 with risk factors for HIV infection. For pregnant patients, it is covered up to 3 times per  pregnancy.    Immunizations:  Immunization Recommendations   Influenza Vaccine Annual influenza vaccination during flu season is recommended for all persons aged >= 6 months who do not have contraindications   Pneumococcal Vaccine   * Pneumococcal conjugate vaccine = PCV13 (Prevnar 13), PCV15 (Vaxneuvance), PCV20 (Prevnar 20)  * Pneumococcal polysaccharide vaccine = PPSV23 (Pneumovax) Adults 19-65 yo with certain risk factors or if 65+ yo  If never received any pneumonia vaccine: recommend Prevnar 20 (PCV20)  Give PCV20 if previously received 1 dose of PCV13 or PPSV23   Hepatitis B Vaccine 3 dose series if at intermediate or high risk (ex: diabetes, end stage renal disease, liver disease)   Respiratory syncytial virus (RSV) Vaccine - COVERED BY MEDICARE PART D  * RSVPreF3 (Arexvy) CDC recommends that adults 60 years of age and older may receive a single dose of RSV vaccine using shared clinical decision-making (SCDM)   Tetanus (Td) Vaccine - COST NOT COVERED BY MEDICARE PART B Following completion of primary series, a booster dose should be given every 10 years to maintain immunity against tetanus. Td may also be given as tetanus wound prophylaxis.   Tdap Vaccine - COST NOT COVERED BY MEDICARE PART B Recommended at least once for all adults. For pregnant patients, recommended with each pregnancy.   Shingles Vaccine (Shingrix) - COST NOT COVERED BY MEDICARE PART B  2 shot series recommended in those 19 years and older who have or will have weakened immune systems or those 50 years and older     Health Maintenance Due:      Topic Date Due   • Breast Cancer Screening: Mammogram  07/11/2026   • DXA SCAN  07/11/2026   • Colorectal Cancer Screening  09/03/2029   • Hepatitis C Screening  Completed     Immunizations Due:  There are no preventive care reminders to display for this patient.  Advance Directives   What are advance directives?  Advance directives are legal documents that state your wishes and plans for medical  care. These plans are made ahead of time in case you lose your ability to make decisions for yourself. Advance directives can apply to any medical decision, such as the treatments you want, and if you want to donate organs.   What are the types of advance directives?  There are many types of advance directives, and each state has rules about how to use them. You may choose a combination of any of the following:  Living will:  This is a written record of the treatment you want. You can also choose which treatments you do not want, which to limit, and which to stop at a certain time. This includes surgery, medicine, IV fluid, and tube feedings.   Durable power of  for healthcare (DPAHC):  This is a written record that states who you want to make healthcare choices for you when you are unable to make them for yourself. This person, called a proxy, is usually a family member or a friend. You may choose more than 1 proxy.  Do not resuscitate (DNR) order:  A DNR order is used in case your heart stops beating or you stop breathing. It is a request not to have certain forms of treatment, such as CPR. A DNR order may be included in other types of advance directives.  Medical directive:  This covers the care that you want if you are in a coma, near death, or unable to make decisions for yourself. You can list the treatments you want for each condition. Treatment may include pain medicine, surgery, blood transfusions, dialysis, IV or tube feedings, and a ventilator (breathing machine).  Values history:  This document has questions about your views, beliefs, and how you feel and think about life. This information can help others choose the care that you would choose.  Why are advance directives important?  An advance directive helps you control your care. Although spoken wishes may be used, it is better to have your wishes written down. Spoken wishes can be misunderstood, or not followed. Treatments may be given even if  you do not want them. An advance directive may make it easier for your family to make difficult choices about your care.   Weight Management   Why it is important to manage your weight:  Being overweight increases your risk of health conditions such as heart disease, high blood pressure, type 2 diabetes, and certain types of cancer. It can also increase your risk for osteoarthritis, sleep apnea, and other respiratory problems. Aim for a slow, steady weight loss. Even a small amount of weight loss can lower your risk of health problems.  How to lose weight safely:  A safe and healthy way to lose weight is to eat fewer calories and get regular exercise. You can lose up about 1 pound a week by decreasing the number of calories you eat by 500 calories each day.   Healthy meal plan for weight management:  A healthy meal plan includes a variety of foods, contains fewer calories, and helps you stay healthy. A healthy meal plan includes the following:  Eat whole-grain foods more often.  A healthy meal plan should contain fiber. Fiber is the part of grains, fruits, and vegetables that is not broken down by your body. Whole-grain foods are healthy and provide extra fiber in your diet. Some examples of whole-grain foods are whole-wheat breads and pastas, oatmeal, brown rice, and bulgur.  Eat a variety of vegetables every day.  Include dark, leafy greens such as spinach, kale, mundo greens, and mustard greens. Eat yellow and orange vegetables such as carrots, sweet potatoes, and winter squash.   Eat a variety of fruits every day.  Choose fresh or canned fruit (canned in its own juice or light syrup) instead of juice. Fruit juice has very little or no fiber.  Eat low-fat dairy foods.  Drink fat-free (skim) milk or 1% milk. Eat fat-free yogurt and low-fat cottage cheese. Try low-fat cheeses such as mozzarella and other reduced-fat cheeses.  Choose meat and other protein foods that are low in fat.  Choose beans or other legumes  such as split peas or lentils. Choose fish, skinless poultry (chicken or turkey), or lean cuts of red meat (beef or pork). Before you cook meat or poultry, cut off any visible fat.   Use less fat and oil.  Try baking foods instead of frying them. Add less fat, such as margarine, sour cream, regular salad dressing and mayonnaise to foods. Eat fewer high-fat foods. Some examples of high-fat foods include french fries, doughnuts, ice cream, and cakes.  Eat fewer sweets.  Limit foods and drinks that are high in sugar. This includes candy, cookies, regular soda, and sweetened drinks.  Exercise:  Exercise at least 30 minutes per day on most days of the week. Some examples of exercise include walking, biking, dancing, and swimming. You can also fit in more physical activity by taking the stairs instead of the elevator or parking farther away from stores. Ask your healthcare provider about the best exercise plan for you.      © Copyright [employer] 2018 Information is for End User's use only and may not be sold, redistributed or otherwise used for commercial purposes. All illustrations and images included in CareNotes® are the copyrighted property of [employer] or [employer]

## 2025-04-03 NOTE — ASSESSMENT & PLAN NOTE
Lab Results   Component Value Date    EGFR 47 03/21/2025    EGFR 41 10/04/2024    EGFR 48 09/07/2023    CREATININE 1.13 03/21/2025    CREATININE 1.26 10/04/2024    CREATININE 1.12 09/07/2023

## 2025-04-03 NOTE — ASSESSMENT & PLAN NOTE
Component  Ref Range & Units (hover) 3/21/25  8:54 AM 10/4/24  9:17 AM   Hemoglobin A1C 6.2 High  5.9 High     123       This is a chronic disease process.   The patient is unstable at this time.  We discussed diet and exercise.  Will monitor labs.  Will recheck labs in 3 months-7/3/2025    Orders:  •  Hemoglobin A1C; Future  •  Hemoglobin A1C; Future

## 2025-04-03 NOTE — PROGRESS NOTES
Name: Talita Dumont      : 1950      MRN: [id number]  Encounter Provider: ANDRADE Kim  Encounter Date: 4/3/2025   Encounter department: [facility], [facility] CARE  :  Assessment & Plan  Medicare annual wellness visit, subsequent    Orders:  •  CBC and differential; Future  •  Comprehensive metabolic panel; Future    Prediabetes  Component  Ref Range & Units (hover) 3/21/25  8:54 AM 10/4/24  9:17 AM   Hemoglobin A1C 6.2 High  5.9 High     123       This is a chronic disease process.   The patient is unstable at this time.  We discussed diet and exercise.  Will monitor labs.  Will recheck labs in 3 months-7/3/2025    Orders:  •  Hemoglobin A1C; Future  •  Hemoglobin A1C; Future    Anemia due to stage 3b chronic kidney disease  (HCC)  Lab Results   Component Value Date    EGFR 47 2025    EGFR 41 10/04/2024    EGFR 48 2023    CREATININE 1.13 2025    CREATININE 1.26 10/04/2024    CREATININE 1.12 2023       Component  Ref Range & Units (hover) 3/21/25  8:54 AM 10/4/24  9:17 AM   Iron Saturation 11 Low  17   TIBC 462 High  401   Iron 49 Low  70        Okay to eat a tiny oranges daily to help increase her iron absorption from her food she already eats.   Will recheck labs in 3 months-7/3/2025         Stage 3b chronic kidney disease (HCC)  Lab Results   Component Value Date    EGFR 47 2025    EGFR 41 10/04/2024    EGFR 48 2023    CREATININE 1.13 2025    CREATININE 1.26 10/04/2024    CREATININE 1.12 2023            Primary insomnia         Essential hypertension         Subclinical hypothyroidism  Component  Ref Range & Units (hover) 3/21/25  8:54 AM 10/4/24  9:17 AM 23  7:52 AM 22  9:50 AM 2/10/22  9:31 AM 21  9:46 AM 20  9:39 AM   TSH 3RD GENERATON 4.028 4.353 CM 4.098 CM 3.610 CM 3.850 High  R, CM 3.930 High  R, CM 3.460        Will recheck labs in 3 months-7/3/2025       Greater trochanteric bursitis of right  hip         Osteopenia of multiple sites         Impaired fasting glucose    Orders:  •  Hemoglobin A1C; Future  •  Hemoglobin A1C; Future    On long term drug therapy    Orders:  •  Hemoglobin A1C; Future  •  Lipid Panel with Direct LDL reflex; Future  •  TSH, 3rd generation with Free T4 reflex; Future  •  Vitamin D 25 hydroxy; Future  •  Hemoglobin A1C; Future  •  TSH, 3rd generation with Free T4 reflex; Future    Pure hypercholesterolemia    Orders:  •  Lipid Panel with Direct LDL reflex; Future    Acquired hypothyroidism    Orders:  •  TSH, 3rd generation with Free T4 reflex; Future  •  TSH, 3rd generation with Free T4 reflex; Future    Abnormal results of thyroid function studies    Orders:  •  TSH, 3rd generation with Free T4 reflex; Future  •  TSH, 3rd generation with Free T4 reflex; Future    Vitamin D deficiency    Orders:  •  Vitamin D 25 hydroxy; Future    Other folate deficiency anemias    Orders:  •  Folate; Future    Iron deficiency anemia secondary to inadequate dietary iron intake    Orders:  •  Iron Panel (Includes Ferritin, Iron Sat%, Iron, and TIBC); Future  •  Iron Panel (Includes Ferritin, Iron Sat%, Iron, and TIBC); Future    Vitamin B12 deficiency (dietary) anemia    Orders:  •  Vitamin B12; Future      Depression Screening and Follow-up Plan: Patient was screened for depression during today's encounter. They screened negative with a PHQ-2 score of 0.      Falls Plan of Care: Assessed feet and footwear. Home safety education provided.     Urinary Incontinence Plan of Care: counseling topics discussed: practice Kegel (pelvic floor strengthening) exercises, use restroom every 2 hours, limit alcohol, caffeine, spicy foods, and acidic foods, limiting fluid intake 3-4 hours before bed and preventing constipation.       Preventive health issues were discussed with patient, and age appropriate screening tests were ordered as noted in patient's After Visit Summary. Personalized health advice and  appropriate referrals for health education or preventive services given if needed, as noted in patient's After Visit Summary.    History of Present Illness     The patient is here today to discuss her medicare annual wellness visit.   I reviewed their medical conditions, medications, laboratory and radiology studies.  I reviewed their scheduled future lab studies with the patient.   The patient's current treatment plan is effective.   There is no change in the current therapy.   I ask the patient to continue their current therapy.   The patient voiced their understanding and agreement.   Follow up in 6 months .  Please continue to the PORCH section of the note for details of today's visit.              Patient Care Team:  ANDRADE Kim as PCP - General (Internal Medicine)  FORTUNATO Woodson MD    Review of Systems   Constitutional:  Negative for activity change, chills, fatigue and fever.   HENT:  Negative for rhinorrhea and sore throat.    Eyes:  Negative for pain.   Respiratory:  Negative for cough and shortness of breath.    Cardiovascular:  Negative for chest pain, palpitations and leg swelling.   Gastrointestinal:  Negative for abdominal pain, constipation, diarrhea, nausea and vomiting.   Genitourinary:  Negative for difficulty urinating, flank pain, frequency and urgency.   Musculoskeletal:  Positive for arthralgias and myalgias. Negative for gait problem and joint swelling.   Skin:  Negative for color change.   Neurological:  Negative for dizziness, weakness, light-headedness and headaches.   Psychiatric/Behavioral:  Negative for sleep disturbance. The patient is not nervous/anxious.    All other systems reviewed and are negative.    Medical History Reviewed by provider this encounter:  Tobacco  Allergies  Meds  Problems  Med Hx  Surg Hx  Fam Hx       Annual Wellness Visit Questionnaire   Taltia is here for her Subsequent Wellness visit. Last Medicare Wellness visit  information reviewed, patient interviewed and updates made to the record today.      Health Risk Assessment:   Patient rates overall health as very good. Patient feels that their physical health rating is slightly worse. Patient is very satisfied with their life. Eyesight was rated as slightly worse. Hearing was rated as slightly worse. Patient feels that their emotional and mental health rating is same. Patients states they are never, rarely angry. Patient states they are never, rarely unusually tired/fatigued. Pain experienced in the last 7 days has been none. Patient states that she has experienced no weight loss or gain in last 6 months.     Depression Screening:   PHQ-2 Score: 0      Fall Risk Screening:   In the past year, patient has experienced: no history of falling in past year      Urinary Incontinence Screening:   Patient has not leaked urine accidently in the last six months.     Home Safety:  Patient does not have trouble with stairs inside or outside of their home. Patient has working smoke alarms and has no working carbon monoxide detector. Home safety hazards include: none.     Nutrition:   Current diet is Regular.     Medications:   Patient is not currently taking any over-the-counter supplements. Patient is able to manage medications.     Activities of Daily Living (ADLs)/Instrumental Activities of Daily Living (IADLs):   Walk and transfer into and out of bed and chair?: Yes  Dress and groom yourself?: Yes    Bathe or shower yourself?: Yes    Feed yourself? Yes  Do your laundry/housekeeping?: Yes  Manage your money, pay your bills and track your expenses?: Yes  Make your own meals?: Yes    Do your own shopping?: Yes    Previous Hospitalizations:   Any hospitalizations or ED visits within the last 12 months?: No      Advance Care Planning:   Living will: Yes    Durable POA for healthcare: Yes    Advanced directive: Yes      Cognitive Screening:   Provider or family/friend/caregiver concerned  regarding cognition?: No    PREVENTIVE SCREENINGS      Cardiovascular Screening:    General: Screening Not Indicated and History Lipid Disorder      Diabetes Screening:     General: Screening Current      Colorectal Cancer Screening:     General: Screening Current      Breast Cancer Screening:     General: Screening Current      Cervical Cancer Screening:    General: Screening Not Indicated      Osteoporosis Screening:    General: Screening Current      Lung Cancer Screening:     General: Screening Not Indicated      Hepatitis C Screening:    General: Screening Current    Screening, Brief Intervention, and Referral to Treatment (SBIRT)     Screening  Typical number of drinks in a day: 0  Typical number of drinks in a week: 1  Interpretation: Low risk drinking behavior.    AUDIT-C Screenin) How often did you have a drink containing alcohol in the past year? 2 to 4 times a month  2) How many drinks did you have on a typical day when you were drinking in the past year? 1 to 2  3) How often did you have 6 or more drinks on one occasion in the past year? never    AUDIT-C Score: 2  Interpretation: Score 0-2 (female): Negative screen for alcohol misuse    Single Item Drug Screening:  How often have you used an illegal drug (including marijuana) or a prescription medication for non-medical reasons in the past year? never    Single Item Drug Screen Score: 0  Interpretation: Negative screen for possible drug use disorder    Other Counseling Topics:   Car/seat belt/driving safety, skin self-exam, sunscreen and calcium and vitamin D intake and regular weightbearing exercise.     Social Drivers of Health     Financial Resource Strain: Low Risk  (3/7/2023)    Overall Financial Resource Strain (CARDIA)    • Difficulty of Paying Living Expenses: Not hard at all   Food Insecurity: No Food Insecurity (4/3/2025)    Hunger Vital Sign    • Worried About Running Out of Food in the Last Year: Never true    • Ran Out of Food in the  "Last Year: Never true   Transportation Needs: No Transportation Needs (4/3/2025)    PRAPARE - Transportation    • Lack of Transportation (Medical): No    • Lack of Transportation (Non-Medical): No   Housing Stability: Low Risk  (4/3/2025)    Housing Stability Vital Sign    • Unable to Pay for Housing in the Last Year: No    • Number of Times Moved in the Last Year: 0    • Homeless in the Last Year: No   Utilities: Not At Risk (4/3/2025)    [facility] Utilities    • Threatened with loss of utilities: No     No results found.    Objective   /86 (BP Location: Left arm, Patient Position: Sitting, Cuff Size: Standard)   Pulse 73   Temp 97.5 °F (36.4 °C) (Tympanic)   Resp 16   Ht 5' 5\" (1.651 m)   Wt 75.3 kg (166 lb)   SpO2 96%   BMI 27.62 kg/m²     Physical Exam  Vitals and nursing note reviewed.   Constitutional:       General: She is awake. She is not in acute distress.     Appearance: Normal appearance. She is well-developed.   HENT:      Head: Normocephalic and atraumatic.      Nose: Nose normal.      Mouth/Throat:      Mouth: Mucous membranes are moist.   Eyes:      Conjunctiva/sclera: Conjunctivae normal.   Cardiovascular:      Rate and Rhythm: Normal rate and regular rhythm.      Pulses: Normal pulses.      Heart sounds: Normal heart sounds. No murmur heard.  Pulmonary:      Effort: Pulmonary effort is normal. No respiratory distress.      Breath sounds: Normal breath sounds.   Abdominal:      General: Bowel sounds are normal.      Palpations: Abdomen is soft.      Tenderness: There is no abdominal tenderness.   Musculoskeletal:      Right shoulder: Tenderness present. Decreased range of motion.      Left elbow: Tenderness present.      Cervical back: Neck supple.      Right lower leg: No edema.      Left lower leg: No edema.   Skin:     General: Skin is warm and dry.   Neurological:      Mental Status: She is alert and oriented to person, place, and time.   Psychiatric:         Attention and Perception: " Attention normal.         Mood and Affect: Mood normal.         Speech: Speech normal.         Behavior: Behavior normal. Behavior is cooperative.         Thought Content: Thought content normal.         Cognition and Memory: Cognition normal.         Judgment: Judgment normal.       Administrative Statements   I have spent a total time of 40 minutes in caring for this patient on the day of the visit/encounter including Diagnostic results, Prognosis, Risks and benefits of tx options, Instructions for management, Patient and family education, Importance of tx compliance, Risk factor reductions, Impressions, Counseling / Coordination of care, Documenting in the medical record, Reviewing/placing orders in the medical record (including tests, medications, and/or procedures), and Obtaining or reviewing history  .

## 2025-04-03 NOTE — ASSESSMENT & PLAN NOTE
Orders:  •  Hemoglobin A1C; Future  •  Lipid Panel with Direct LDL reflex; Future  •  TSH, 3rd generation with Free T4 reflex; Future  •  Vitamin D 25 hydroxy; Future  •  Hemoglobin A1C; Future  •  TSH, 3rd generation with Free T4 reflex; Future

## 2025-07-14 DIAGNOSIS — F51.01 PRIMARY INSOMNIA: Chronic | ICD-10-CM

## 2025-07-14 DIAGNOSIS — I10 ESSENTIAL HYPERTENSION: Chronic | ICD-10-CM

## 2025-07-14 NOTE — TELEPHONE ENCOUNTER
Patient called medication refill  Requested Prescriptions     Pending Prescriptions Disp Refills    zolpidem (AMBIEN) 10 mg tablet 90 tablet 0     Sig: TAKE 1 TABLET BY MOUTH  EVERY NIGHT AT BEDTIME AS  NEEDED FOR INSOMNIA    amLODIPine-benazepril (LOTREL) 10-40 MG per capsule 100 capsule 3     Sig: Take 1 capsule by mouth daily     [facility] home delivery    Please advise, thank you,

## 2025-07-16 RX ORDER — AMLODIPINE AND BENAZEPRIL HYDROCHLORIDE 10; 40 MG/1; MG/1
1 CAPSULE ORAL DAILY
Qty: 100 CAPSULE | Refills: 1 | Status: SHIPPED | OUTPATIENT
Start: 2025-07-16

## 2025-07-16 RX ORDER — ZOLPIDEM TARTRATE 10 MG/1
TABLET ORAL
Qty: 90 TABLET | Refills: 0 | Status: SHIPPED | OUTPATIENT
Start: 2025-07-16

## (undated) DEVICE — SUT VICRYL 3-0 SH 27 IN J416H

## (undated) DEVICE — CHLORAPREP HI-LITE 26ML ORANGE

## (undated) DEVICE — SUT MONOCRYL 5-0 PC-2 18 IN Y495G

## (undated) DEVICE — TUBING SUCTION 5MM X 12 FT

## (undated) DEVICE — SUT VICRYL 4-0 PS-2 27 IN J426H

## (undated) DEVICE — POV-IOD SOLUTION 4OZ BT

## (undated) DEVICE — CUFF TOURNIQUET 18 X 4 IN QUICK CONNECT DISP 1 BLADDER

## (undated) DEVICE — SCD SEQUENTIAL COMPRESSION COMFORT SLEEVE MEDIUM KNEE LENGTH: Brand: KENDALL SCD

## (undated) DEVICE — 10FR FRAZIER SUCTION HANDLE: Brand: CARDINAL HEALTH

## (undated) DEVICE — CAST PADDING 4 IN SYNTHETIC NON-STRL

## (undated) DEVICE — STOCKINETTE REGULAR

## (undated) DEVICE — 2000CC GUARDIAN II: Brand: GUARDIAN

## (undated) DEVICE — NEEDLE 18 G X 1 1/2

## (undated) DEVICE — SYRINGE 5ML LL

## (undated) DEVICE — BETHLEHEM UNIVERSAL  MIONR EXT: Brand: CARDINAL HEALTH

## (undated) DEVICE — ASTOUND STANDARD SURGICAL GOWN, XL: Brand: CONVERTORS

## (undated) DEVICE — MASTISOL LIQ ADHESIVE 2/3ML

## (undated) DEVICE — GLOVE INDICATOR PI UNDERGLOVE SZ 7 BLUE

## (undated) DEVICE — SYRINGE 10ML LL

## (undated) DEVICE — CURITY NON-ADHERENT STRIPS: Brand: CURITY

## (undated) DEVICE — NEEDLE 25G X 1 1/2

## (undated) DEVICE — Device

## (undated) DEVICE — GLOVE SRG BIOGEL 6.5

## (undated) DEVICE — ACE WRAP 4 IN UNSTERILE

## (undated) DEVICE — 3M™ STERI-STRIP™ REINFORCED ADHESIVE SKIN CLOSURES, R1546, 1/4 IN X 4 IN (6 MM X 100 MM), 10 STRIPS/ENVELOPE: Brand: 3M™ STERI-STRIP™

## (undated) DEVICE — GLOVE SRG BIOGEL 7

## (undated) DEVICE — BLADE SAGITTAL 25.6 X 9.5MM

## (undated) DEVICE — INTENDED FOR TISSUE SEPARATION, AND OTHER PROCEDURES THAT REQUIRE A SHARP SURGICAL BLADE TO PUNCTURE OR CUT.: Brand: BARD-PARKER ® CARBON RIB-BACK BLADES

## (undated) DEVICE — STRETCH BANDAGE: Brand: CURITY

## (undated) DEVICE — GLOVE PI ULTRA TOUCH SZ.6.5

## (undated) DEVICE — PLUMEPEN PRO 10FT